# Patient Record
Sex: FEMALE | Race: WHITE | Employment: OTHER | URBAN - METROPOLITAN AREA
[De-identification: names, ages, dates, MRNs, and addresses within clinical notes are randomized per-mention and may not be internally consistent; named-entity substitution may affect disease eponyms.]

---

## 2018-09-19 ENCOUNTER — OFFICE VISIT (OUTPATIENT)
Dept: ENT CLINIC | Age: 63
End: 2018-09-19
Payer: MEDICARE

## 2018-09-19 VITALS
SYSTOLIC BLOOD PRESSURE: 145 MMHG | BODY MASS INDEX: 36 KG/M2 | HEIGHT: 66 IN | DIASTOLIC BLOOD PRESSURE: 50 MMHG | HEART RATE: 60 BPM | WEIGHT: 224 LBS

## 2018-09-19 DIAGNOSIS — J01.90 ACUTE SINUSITIS TREATED WITH ANTIBIOTICS IN THE PAST 60 DAYS: Primary | ICD-10-CM

## 2018-09-19 PROCEDURE — G8417 CALC BMI ABV UP PARAM F/U: HCPCS | Performed by: OTOLARYNGOLOGY

## 2018-09-19 PROCEDURE — 3017F COLORECTAL CA SCREEN DOC REV: CPT | Performed by: OTOLARYNGOLOGY

## 2018-09-19 PROCEDURE — 4040F PNEUMOC VAC/ADMIN/RCVD: CPT | Performed by: OTOLARYNGOLOGY

## 2018-09-19 PROCEDURE — 1101F PT FALLS ASSESS-DOCD LE1/YR: CPT | Performed by: OTOLARYNGOLOGY

## 2018-09-19 PROCEDURE — 99203 OFFICE O/P NEW LOW 30 MIN: CPT | Performed by: OTOLARYNGOLOGY

## 2018-09-19 PROCEDURE — 1123F ACP DISCUSS/DSCN MKR DOCD: CPT | Performed by: OTOLARYNGOLOGY

## 2018-09-19 PROCEDURE — G8427 DOCREV CUR MEDS BY ELIG CLIN: HCPCS | Performed by: OTOLARYNGOLOGY

## 2018-09-19 PROCEDURE — 1090F PRES/ABSN URINE INCON ASSESS: CPT | Performed by: OTOLARYNGOLOGY

## 2018-09-19 PROCEDURE — 1036F TOBACCO NON-USER: CPT | Performed by: OTOLARYNGOLOGY

## 2018-09-19 PROCEDURE — G8400 PT W/DXA NO RESULTS DOC: HCPCS | Performed by: OTOLARYNGOLOGY

## 2018-09-19 RX ORDER — INFLUENZA A VIRUS A/MICHIGAN/45/2015 X-275 (H1N1) ANTIGEN (FORMALDEHYDE INACTIVATED), INFLUENZA A VIRUS A/SINGAPORE/INFIMH-16-0019/2016 IVR-186 (H3N2) ANTIGEN (FORMALDEHYDE INACTIVATED), AND INFLUENZA B VIRUS B/MARYLAND/15/2016 BX-69A (A B/COLORADO/6/2017-LIKE VIRUS) ANTIGEN (FORMALDEHYDE INACTIVATED) 60; 60; 60 UG/.5ML; UG/.5ML; UG/.5ML
INJECTION, SUSPENSION INTRAMUSCULAR
Refills: 0 | COMMUNITY
Start: 2018-09-06 | End: 2020-04-14

## 2018-09-19 RX ORDER — AMLODIPINE BESYLATE 5 MG/1
TABLET ORAL
COMMUNITY
Start: 2016-02-01 | End: 2018-09-19

## 2018-09-19 RX ORDER — FLUTICASONE PROPIONATE 50 MCG
2 SPRAY, SUSPENSION (ML) NASAL DAILY
Qty: 1 BOTTLE | Refills: 1 | Status: SHIPPED
Start: 2018-09-19 | End: 2020-04-14

## 2018-09-19 RX ORDER — PNEUMOCOCCAL 13-VALENT CONJUGATE VACCINE 2.2; 2.2; 2.2; 2.2; 2.2; 4.4; 2.2; 2.2; 2.2; 2.2; 2.2; 2.2; 2.2 UG/.5ML; UG/.5ML; UG/.5ML; UG/.5ML; UG/.5ML; UG/.5ML; UG/.5ML; UG/.5ML; UG/.5ML; UG/.5ML; UG/.5ML; UG/.5ML; UG/.5ML
INJECTION, SUSPENSION INTRAMUSCULAR
Refills: 0 | COMMUNITY
Start: 2018-09-06 | End: 2020-04-14

## 2018-09-19 RX ORDER — ATENOLOL 50 MG/1
50 TABLET ORAL NIGHTLY
Status: ON HOLD | COMMUNITY
Start: 2002-01-28 | End: 2021-02-07 | Stop reason: HOSPADM

## 2018-09-19 RX ORDER — LOSARTAN POTASSIUM AND HYDROCHLOROTHIAZIDE 25; 100 MG/1; MG/1
1 TABLET ORAL NIGHTLY
COMMUNITY
Start: 2018-08-07 | End: 2022-03-23 | Stop reason: ALTCHOICE

## 2019-03-27 ENCOUNTER — OFFICE VISIT (OUTPATIENT)
Dept: FAMILY MEDICINE CLINIC | Age: 64
End: 2019-03-27
Payer: MEDICARE

## 2019-03-27 VITALS
RESPIRATION RATE: 16 BRPM | SYSTOLIC BLOOD PRESSURE: 126 MMHG | DIASTOLIC BLOOD PRESSURE: 80 MMHG | HEIGHT: 66 IN | OXYGEN SATURATION: 96 % | HEART RATE: 76 BPM | WEIGHT: 225 LBS | BODY MASS INDEX: 36.16 KG/M2 | TEMPERATURE: 98.6 F

## 2019-03-27 DIAGNOSIS — J32.9 SINOBRONCHITIS: Primary | ICD-10-CM

## 2019-03-27 DIAGNOSIS — J40 SINOBRONCHITIS: Primary | ICD-10-CM

## 2019-03-27 PROCEDURE — G8484 FLU IMMUNIZE NO ADMIN: HCPCS | Performed by: PHYSICIAN ASSISTANT

## 2019-03-27 PROCEDURE — 1123F ACP DISCUSS/DSCN MKR DOCD: CPT | Performed by: PHYSICIAN ASSISTANT

## 2019-03-27 PROCEDURE — 1090F PRES/ABSN URINE INCON ASSESS: CPT | Performed by: PHYSICIAN ASSISTANT

## 2019-03-27 PROCEDURE — 1036F TOBACCO NON-USER: CPT | Performed by: PHYSICIAN ASSISTANT

## 2019-03-27 PROCEDURE — 3017F COLORECTAL CA SCREEN DOC REV: CPT | Performed by: PHYSICIAN ASSISTANT

## 2019-03-27 PROCEDURE — 4040F PNEUMOC VAC/ADMIN/RCVD: CPT | Performed by: PHYSICIAN ASSISTANT

## 2019-03-27 PROCEDURE — G8400 PT W/DXA NO RESULTS DOC: HCPCS | Performed by: PHYSICIAN ASSISTANT

## 2019-03-27 PROCEDURE — G8427 DOCREV CUR MEDS BY ELIG CLIN: HCPCS | Performed by: PHYSICIAN ASSISTANT

## 2019-03-27 PROCEDURE — 99213 OFFICE O/P EST LOW 20 MIN: CPT | Performed by: PHYSICIAN ASSISTANT

## 2019-03-27 PROCEDURE — G8417 CALC BMI ABV UP PARAM F/U: HCPCS | Performed by: PHYSICIAN ASSISTANT

## 2019-03-27 RX ORDER — ALBUTEROL SULFATE 90 UG/1
2 AEROSOL, METERED RESPIRATORY (INHALATION)
Qty: 1 INHALER | Refills: 0 | Status: SHIPPED
Start: 2019-03-27 | End: 2021-02-04

## 2019-03-27 RX ORDER — DOXYCYCLINE HYCLATE 100 MG/1
100 CAPSULE ORAL 2 TIMES DAILY
Qty: 20 CAPSULE | Refills: 0 | Status: SHIPPED | OUTPATIENT
Start: 2019-03-27 | End: 2019-04-06

## 2019-03-27 RX ORDER — BENZONATATE 200 MG/1
200 CAPSULE ORAL 3 TIMES DAILY PRN
Qty: 15 CAPSULE | Refills: 0 | Status: SHIPPED
Start: 2019-03-27 | End: 2020-04-14

## 2019-03-28 ENCOUNTER — HOSPITAL ENCOUNTER (EMERGENCY)
Age: 64
Discharge: HOME | End: 2019-03-28
Payer: MEDICARE

## 2019-03-28 ENCOUNTER — APPOINTMENT (OUTPATIENT)
Dept: GENERAL RADIOLOGY | Age: 64
End: 2019-03-28
Payer: MEDICARE

## 2019-03-28 VITALS
OXYGEN SATURATION: 93 % | WEIGHT: 225 LBS | SYSTOLIC BLOOD PRESSURE: 138 MMHG | BODY MASS INDEX: 36.16 KG/M2 | TEMPERATURE: 98.8 F | HEIGHT: 66 IN | DIASTOLIC BLOOD PRESSURE: 86 MMHG | RESPIRATION RATE: 16 BRPM | HEART RATE: 89 BPM

## 2019-03-28 DIAGNOSIS — J06.9 UPPER RESPIRATORY TRACT INFECTION, UNSPECIFIED TYPE: Primary | ICD-10-CM

## 2019-03-28 DIAGNOSIS — R05.9 COUGH: ICD-10-CM

## 2019-03-28 DIAGNOSIS — E87.6 HYPOKALEMIA: ICD-10-CM

## 2019-03-28 LAB
ANION GAP SERPL CALCULATED.3IONS-SCNC: 15 MMOL/L (ref 7–16)
BACTERIA: ABNORMAL /HPF
BASOPHILS ABSOLUTE: 0.05 E9/L (ref 0–0.2)
BASOPHILS RELATIVE PERCENT: 0.5 % (ref 0–2)
BILIRUBIN URINE: ABNORMAL
BLOOD, URINE: NEGATIVE
BUN BLDV-MCNC: 22 MG/DL (ref 8–23)
CALCIUM SERPL-MCNC: 9.1 MG/DL (ref 8.6–10.2)
CHLORIDE BLD-SCNC: 97 MMOL/L (ref 98–107)
CLARITY: CLEAR
CO2: 27 MMOL/L (ref 22–29)
COLOR: YELLOW
CREAT SERPL-MCNC: 1.2 MG/DL (ref 0.5–1)
EOSINOPHILS ABSOLUTE: 0.05 E9/L (ref 0.05–0.5)
EOSINOPHILS RELATIVE PERCENT: 0.5 % (ref 0–6)
GFR AFRICAN AMERICAN: 54
GFR NON-AFRICAN AMERICAN: 45 ML/MIN/1.73
GLUCOSE BLD-MCNC: 123 MG/DL (ref 74–99)
GLUCOSE URINE: NEGATIVE MG/DL
HCT VFR BLD CALC: 37.6 % (ref 34–48)
HEMOGLOBIN: 12.5 G/DL (ref 11.5–15.5)
IMMATURE GRANULOCYTES #: 0.06 E9/L
IMMATURE GRANULOCYTES %: 0.6 % (ref 0–5)
INFLUENZA A BY PCR: NOT DETECTED
INFLUENZA B BY PCR: NOT DETECTED
KETONES, URINE: NEGATIVE MG/DL
LEUKOCYTE ESTERASE, URINE: NEGATIVE
LYMPHOCYTES ABSOLUTE: 1.83 E9/L (ref 1.5–4)
LYMPHOCYTES RELATIVE PERCENT: 17.8 % (ref 20–42)
MCH RBC QN AUTO: 29.8 PG (ref 26–35)
MCHC RBC AUTO-ENTMCNC: 33.2 % (ref 32–34.5)
MCV RBC AUTO: 89.5 FL (ref 80–99.9)
MONOCYTES ABSOLUTE: 0.83 E9/L (ref 0.1–0.95)
MONOCYTES RELATIVE PERCENT: 8.1 % (ref 2–12)
NEUTROPHILS ABSOLUTE: 7.46 E9/L (ref 1.8–7.3)
NEUTROPHILS RELATIVE PERCENT: 72.5 % (ref 43–80)
NITRITE, URINE: NEGATIVE
PDW BLD-RTO: 14.1 FL (ref 11.5–15)
PH UA: 5.5 (ref 5–9)
PLATELET # BLD: 316 E9/L (ref 130–450)
PMV BLD AUTO: 10.2 FL (ref 7–12)
POTASSIUM SERPL-SCNC: 3.1 MMOL/L (ref 3.5–5)
PROTEIN UA: 30 MG/DL
RBC # BLD: 4.2 E12/L (ref 3.5–5.5)
RBC # BLD: NORMAL 10*6/UL
RBC UA: ABNORMAL /HPF (ref 0–2)
SODIUM BLD-SCNC: 139 MMOL/L (ref 132–146)
SPECIFIC GRAVITY UA: >=1.03 (ref 1–1.03)
TROPONIN: <0.01 NG/ML (ref 0–0.03)
UROBILINOGEN, URINE: 0.2 E.U./DL
WBC # BLD: 10.3 E9/L (ref 4.5–11.5)
WBC UA: ABNORMAL /HPF (ref 0–5)

## 2019-03-28 PROCEDURE — 6370000000 HC RX 637 (ALT 250 FOR IP): Performed by: NURSE PRACTITIONER

## 2019-03-28 PROCEDURE — 71046 X-RAY EXAM CHEST 2 VIEWS: CPT

## 2019-03-28 PROCEDURE — 85025 COMPLETE CBC W/AUTO DIFF WBC: CPT

## 2019-03-28 PROCEDURE — 84484 ASSAY OF TROPONIN QUANT: CPT

## 2019-03-28 PROCEDURE — 99284 EMERGENCY DEPT VISIT MOD MDM: CPT

## 2019-03-28 PROCEDURE — 80048 BASIC METABOLIC PNL TOTAL CA: CPT

## 2019-03-28 PROCEDURE — 81001 URINALYSIS AUTO W/SCOPE: CPT

## 2019-03-28 PROCEDURE — 87502 INFLUENZA DNA AMP PROBE: CPT

## 2019-03-28 PROCEDURE — 36415 COLL VENOUS BLD VENIPUNCTURE: CPT

## 2019-03-28 RX ORDER — CETIRIZINE HYDROCHLORIDE 10 MG/1
10 TABLET ORAL ONCE
Status: COMPLETED | OUTPATIENT
Start: 2019-03-28 | End: 2019-03-28

## 2019-03-28 RX ORDER — ACETAMINOPHEN 325 MG/1
650 TABLET ORAL ONCE
Status: COMPLETED | OUTPATIENT
Start: 2019-03-28 | End: 2019-03-28

## 2019-03-28 RX ORDER — BENZONATATE 100 MG/1
200 CAPSULE ORAL ONCE
Status: COMPLETED | OUTPATIENT
Start: 2019-03-28 | End: 2019-03-28

## 2019-03-28 RX ORDER — POTASSIUM CHLORIDE 20 MEQ/1
40 TABLET, EXTENDED RELEASE ORAL ONCE
Status: COMPLETED | OUTPATIENT
Start: 2019-03-28 | End: 2019-03-28

## 2019-03-28 RX ADMIN — POTASSIUM CHLORIDE 40 MEQ: 20 TABLET, EXTENDED RELEASE ORAL at 22:21

## 2019-03-28 RX ADMIN — CETIRIZINE HYDROCHLORIDE 10 MG: 10 TABLET, FILM COATED ORAL at 20:00

## 2019-03-28 RX ADMIN — BENZONATATE 200 MG: 100 CAPSULE ORAL at 21:27

## 2019-03-28 RX ADMIN — ACETAMINOPHEN 650 MG: 325 TABLET ORAL at 20:00

## 2019-03-28 RX ADMIN — ACETAMINOPHEN 650 MG: 325 TABLET ORAL at 21:27

## 2019-03-28 ASSESSMENT — PAIN SCALES - GENERAL
PAINLEVEL_OUTOF10: 10
PAINLEVEL_OUTOF10: 10

## 2019-03-28 NOTE — ED PROVIDER NOTES
EKG 12 Lead   Result Value Ref Range    Ventricular Rate 89 BPM    Atrial Rate 89 BPM    P-R Interval 138 ms    QRS Duration 76 ms    Q-T Interval 390 ms    QTc Calculation (Bazett) 474 ms    P Axis 65 degrees    R Axis 27 degrees    T Axis 52 degrees       RADIOLOGY:  Interpreted by Radiologist.  XR CHEST STANDARD (2 VW)   Final Result      No airspace opacities or pleural effusion. EKG #1:  Interpreted by emergency department physician unless otherwise noted. Time:  2004    Rate: 89  Rhythm: Sinus. Interpretation: Normal sinus rhythm with no ST MI, no comparison available - per Dr. Dimitri Dawkins    ------------------------- NURSING NOTES AND VITALS REVIEWED ---------------------------   The nursing notes within the ED encounter and vital signs as below have been reviewed. /86   Pulse 89   Temp 98.8 °F (37.1 °C) (Oral)   Resp 16   Ht 5' 6\" (1.676 m)   Wt 225 lb (102.1 kg)   SpO2 93%   BMI 36.32 kg/m²   Oxygen Saturation Interpretation: 93%      ---------------------------------------------------PHYSICAL EXAM--------------------------------------      Constitutional/General: Alert and oriented x3, well appearing, non toxic in NAD  Head: NC/AT  Eye/Ears: PERRL, bilaterally EOMs without pain, no globe tenderness, sclera clear. Bilateral tympanic membranes are normal, no pre-or postauricular nodes, mastoids are normal.  Nose: Bilateral nasal turbinates mildly injected, boggy, clear mucus noted  Mouth: Oropharynx mildly injected with copious clear mucus, tonsils are not visible or surgically absent, uvula ML normal, handling secretions, MMM, no trismus or TMJ, normal dentition w/o abscess noted  Neck: Supple, full ROM, no ML cervical vertebral bone tenderness throughout, no meningeal signs  Pulmonary: Lungs clear to auscultation bilaterally, no wheezes, rales, or rhonchi. Not in respiratory distress  Cardiovascular:  Regular rate and rhythm, no murmurs, gallops, or rubs.  2+ distal pulses  Abdomen: Soft, non tender, non distended,   Back: NT  Extremities: Moves all extremities x 4. Warm and well perfused  Skin: warm and dry without rash  Neurologic: GCS 15, Face is symmetric (VII), EOMs are intact (III, IV, VI), pupils are equal and reactive (II, III), tongue is midline (XII). The patient is walking in a smooth balanced and coordinated fashion w/o bumping or walking into anything (vision), talking w/ appropriate content and fluency, is fully attentive, and provided a spontaneous coherent history. Psych: Normal Affect      ------------------------------------------PROGRESS NOTES -------------------------------------------    MDM  Patient has an upper respiratory infection and is on doxycycline for which she was treated at an urgent care center. She does not have any signs of infectious bacterial process. I'm still treating her with antihistamines, Tessalon, pushing by mouth fluids is important and chicken soup. Follow closely with her primary care physician. If she starts to run a fever she controlled choose to continue to take the doxycycline however I feel this is more viral than bacterial and she is a nonsmoker. Danger signs symptoms of worsening condition were detailed with the patient need follow-up with emergency department if they do occur and handout was provided. Patient had hypokalemia was treated with 40 mEq of potassium orally. Follow-up with her PCP. ED COURSE MEDICATIONS:                Medications   potassium chloride (KLOR-CON M) extended release tablet 40 mEq (has no administration in time range)   cetirizine (ZYRTEC) tablet 10 mg (10 mg Oral Given 3/28/19 2000)   acetaminophen (TYLENOL) tablet 650 mg (650 mg Oral Given 3/28/19 2000)   acetaminophen (TYLENOL) tablet 650 mg (650 mg Oral Given 3/28/19 2127)   benzonatate (TESSALON) capsule 200 mg (200 mg Oral Given 3/28/19 2127)       RE-Evaluation(s):    Time: 2034   Patients symptoms are improving.   Repeat physical examination has not changed    Re-examination:  3/28/19     Time: 2109  Patients symptoms show no change. Repeat physical examination is unchanged. COUNSELING:   I have spoken with the spouse and patient and discussed todays results, in addition to providing specific details for the plan of care and counseling regarding the diagnosis and prognosis.     --------------------------------------- IMPRESSION & DISPOSITION --------------------------------     IMPRESSION(s):  1. Upper respiratory tract infection, unspecified type    2. Cough    3. Hypokalemia          DISPOSITION:  Disposition: Discharge to home. Patient condition is good.           Lb Bronson, JUDY - CNP  03/28/19 7775

## 2019-03-30 ENCOUNTER — HOSPITAL ENCOUNTER (EMERGENCY)
Age: 64
Discharge: HOME | End: 2019-03-30
Attending: EMERGENCY MEDICINE
Payer: MEDICARE

## 2019-03-30 ENCOUNTER — APPOINTMENT (OUTPATIENT)
Dept: GENERAL RADIOLOGY | Age: 64
End: 2019-03-30
Payer: MEDICARE

## 2019-03-30 VITALS
WEIGHT: 225 LBS | RESPIRATION RATE: 16 BRPM | HEART RATE: 72 BPM | SYSTOLIC BLOOD PRESSURE: 145 MMHG | TEMPERATURE: 97.8 F | OXYGEN SATURATION: 95 % | DIASTOLIC BLOOD PRESSURE: 88 MMHG | BODY MASS INDEX: 36.16 KG/M2 | HEIGHT: 66 IN

## 2019-03-30 DIAGNOSIS — J06.9 VIRAL URI WITH COUGH: Primary | ICD-10-CM

## 2019-03-30 LAB
ALBUMIN SERPL-MCNC: 3.3 G/DL (ref 3.5–5.2)
ALP BLD-CCNC: 65 U/L (ref 35–104)
ALT SERPL-CCNC: 22 U/L (ref 0–32)
ANION GAP SERPL CALCULATED.3IONS-SCNC: 15 MMOL/L (ref 7–16)
ANISOCYTOSIS: ABNORMAL
AST SERPL-CCNC: 44 U/L (ref 0–31)
BACTERIA: ABNORMAL /HPF
BASOPHILS ABSOLUTE: 0.09 E9/L (ref 0–0.2)
BASOPHILS RELATIVE PERCENT: 0.9 % (ref 0–2)
BILIRUB SERPL-MCNC: 0.3 MG/DL (ref 0–1.2)
BILIRUBIN URINE: ABNORMAL
BLOOD, URINE: NEGATIVE
BUN BLDV-MCNC: 51 MG/DL (ref 8–23)
CALCIUM SERPL-MCNC: 8.4 MG/DL (ref 8.6–10.2)
CASTS: ABNORMAL /LPF
CHLORIDE BLD-SCNC: 102 MMOL/L (ref 98–107)
CLARITY: CLEAR
CO2: 22 MMOL/L (ref 22–29)
COLOR: YELLOW
CREAT SERPL-MCNC: 2.2 MG/DL (ref 0.5–1)
EOSINOPHILS ABSOLUTE: 0 E9/L (ref 0.05–0.5)
EOSINOPHILS RELATIVE PERCENT: 1.3 % (ref 0–6)
GFR AFRICAN AMERICAN: 27
GFR NON-AFRICAN AMERICAN: 22 ML/MIN/1.73
GLUCOSE BLD-MCNC: 121 MG/DL (ref 74–99)
GLUCOSE URINE: NEGATIVE MG/DL
HCT VFR BLD CALC: 34.9 % (ref 34–48)
HEMOGLOBIN: 11.7 G/DL (ref 11.5–15.5)
KETONES, URINE: ABNORMAL MG/DL
LACTIC ACID, SEPSIS: 1 MMOL/L (ref 0.5–1.9)
LEUKOCYTE ESTERASE, URINE: NEGATIVE
LYMPHOCYTES ABSOLUTE: 3.47 E9/L (ref 1.5–4)
LYMPHOCYTES RELATIVE PERCENT: 34.8 % (ref 20–42)
MCH RBC QN AUTO: 29.5 PG (ref 26–35)
MCHC RBC AUTO-ENTMCNC: 33.5 % (ref 32–34.5)
MCV RBC AUTO: 87.9 FL (ref 80–99.9)
METAMYELOCYTES RELATIVE PERCENT: 0.9 % (ref 0–1)
MONOCYTES ABSOLUTE: 0.4 E9/L (ref 0.1–0.95)
MONOCYTES RELATIVE PERCENT: 3.5 % (ref 2–12)
MYELOCYTE PERCENT: 0.9 % (ref 0–0)
NEUTROPHILS ABSOLUTE: 6.04 E9/L (ref 1.8–7.3)
NEUTROPHILS RELATIVE PERCENT: 59.1 % (ref 43–80)
NITRITE, URINE: NEGATIVE
PDW BLD-RTO: 14.1 FL (ref 11.5–15)
PH UA: 5 (ref 5–9)
PLATELET # BLD: 310 E9/L (ref 130–450)
PMV BLD AUTO: 10.5 FL (ref 7–12)
POTASSIUM SERPL-SCNC: 3.6 MMOL/L (ref 3.5–5)
PRO-BNP: 178 PG/ML (ref 0–125)
PROTEIN UA: ABNORMAL MG/DL
RBC # BLD: 3.97 E12/L (ref 3.5–5.5)
RBC UA: ABNORMAL /HPF (ref 0–2)
SODIUM BLD-SCNC: 139 MMOL/L (ref 132–146)
SPECIFIC GRAVITY UA: >=1.03 (ref 1–1.03)
TOTAL PROTEIN: 7.2 G/DL (ref 6.4–8.3)
TROPONIN: <0.01 NG/ML (ref 0–0.03)
UROBILINOGEN, URINE: 0.2 E.U./DL
WBC # BLD: 9.9 E9/L (ref 4.5–11.5)
WBC UA: ABNORMAL /HPF (ref 0–5)

## 2019-03-30 PROCEDURE — 94664 DEMO&/EVAL PT USE INHALER: CPT

## 2019-03-30 PROCEDURE — 6360000002 HC RX W HCPCS: Performed by: STUDENT IN AN ORGANIZED HEALTH CARE EDUCATION/TRAINING PROGRAM

## 2019-03-30 PROCEDURE — 2580000003 HC RX 258: Performed by: STUDENT IN AN ORGANIZED HEALTH CARE EDUCATION/TRAINING PROGRAM

## 2019-03-30 PROCEDURE — 85025 COMPLETE CBC W/AUTO DIFF WBC: CPT

## 2019-03-30 PROCEDURE — 87486 CHLMYD PNEUM DNA AMP PROBE: CPT

## 2019-03-30 PROCEDURE — 83605 ASSAY OF LACTIC ACID: CPT

## 2019-03-30 PROCEDURE — 99284 EMERGENCY DEPT VISIT MOD MDM: CPT

## 2019-03-30 PROCEDURE — 6370000000 HC RX 637 (ALT 250 FOR IP): Performed by: STUDENT IN AN ORGANIZED HEALTH CARE EDUCATION/TRAINING PROGRAM

## 2019-03-30 PROCEDURE — 81001 URINALYSIS AUTO W/SCOPE: CPT

## 2019-03-30 PROCEDURE — 87798 DETECT AGENT NOS DNA AMP: CPT

## 2019-03-30 PROCEDURE — 71046 X-RAY EXAM CHEST 2 VIEWS: CPT

## 2019-03-30 PROCEDURE — 84484 ASSAY OF TROPONIN QUANT: CPT

## 2019-03-30 PROCEDURE — 36415 COLL VENOUS BLD VENIPUNCTURE: CPT

## 2019-03-30 PROCEDURE — 87581 M.PNEUMON DNA AMP PROBE: CPT

## 2019-03-30 PROCEDURE — 83880 ASSAY OF NATRIURETIC PEPTIDE: CPT

## 2019-03-30 PROCEDURE — 94640 AIRWAY INHALATION TREATMENT: CPT

## 2019-03-30 PROCEDURE — 80053 COMPREHEN METABOLIC PANEL: CPT

## 2019-03-30 PROCEDURE — 87633 RESP VIRUS 12-25 TARGETS: CPT

## 2019-03-30 RX ORDER — PREDNISONE 20 MG/1
60 TABLET ORAL ONCE
Status: COMPLETED | OUTPATIENT
Start: 2019-03-30 | End: 2019-03-30

## 2019-03-30 RX ORDER — ONDANSETRON 4 MG/1
4 TABLET, ORALLY DISINTEGRATING ORAL EVERY 8 HOURS PRN
Qty: 20 TABLET | Refills: 0 | Status: SHIPPED | OUTPATIENT
Start: 2019-03-30 | End: 2021-02-04

## 2019-03-30 RX ORDER — GUAIFENESIN 600 MG/1
600 TABLET, EXTENDED RELEASE ORAL 2 TIMES DAILY
Qty: 20 TABLET | Refills: 0 | Status: SHIPPED | OUTPATIENT
Start: 2019-03-30 | End: 2019-04-09

## 2019-03-30 RX ORDER — ALBUTEROL SULFATE 2.5 MG/3ML
2.5 SOLUTION RESPIRATORY (INHALATION) ONCE
Status: COMPLETED | OUTPATIENT
Start: 2019-03-30 | End: 2019-03-30

## 2019-03-30 RX ORDER — PREDNISONE 20 MG/1
40 TABLET ORAL DAILY
Qty: 6 TABLET | Refills: 0 | Status: SHIPPED | OUTPATIENT
Start: 2019-03-30 | End: 2019-04-02

## 2019-03-30 RX ORDER — 0.9 % SODIUM CHLORIDE 0.9 %
1000 INTRAVENOUS SOLUTION INTRAVENOUS ONCE
Status: COMPLETED | OUTPATIENT
Start: 2019-03-30 | End: 2019-03-30

## 2019-03-30 RX ORDER — GUAIFENESIN 400 MG/1
400 TABLET ORAL ONCE
Status: COMPLETED | OUTPATIENT
Start: 2019-03-30 | End: 2019-03-30

## 2019-03-30 RX ADMIN — ALBUTEROL SULFATE 2.5 MG: 2.5 SOLUTION RESPIRATORY (INHALATION) at 20:05

## 2019-03-30 RX ADMIN — PREDNISONE 60 MG: 20 TABLET ORAL at 20:37

## 2019-03-30 RX ADMIN — SODIUM CHLORIDE 1000 ML: 9 INJECTION, SOLUTION INTRAVENOUS at 20:37

## 2019-03-30 RX ADMIN — SODIUM CHLORIDE 1000 ML: 9 INJECTION, SOLUTION INTRAVENOUS at 18:51

## 2019-03-30 RX ADMIN — GUAIFENESIN 400 MG: 400 TABLET ORAL at 19:35

## 2019-03-30 ASSESSMENT — PAIN DESCRIPTION - LOCATION: LOCATION: ABDOMEN;BACK

## 2019-03-30 ASSESSMENT — PAIN SCALES - GENERAL: PAINLEVEL_OUTOF10: 6

## 2019-03-30 ASSESSMENT — PAIN DESCRIPTION - ORIENTATION: ORIENTATION: LOWER

## 2019-03-30 ASSESSMENT — PAIN DESCRIPTION - DESCRIPTORS: DESCRIPTORS: ACHING

## 2019-03-30 ASSESSMENT — PAIN DESCRIPTION - PAIN TYPE: TYPE: ACUTE PAIN

## 2019-03-30 NOTE — ED PROVIDER NOTES
Department of Emergency Medicine   ED  Provider Note  Admit Date/RoomTime: 3/30/2019  5:01 PM  ED Room: [facility]          History of Present Illness:  3/30/19, Time: 6:44 PM         Adrian Allen is a 75 y.o. female with past medical history of multiple sclerosis presenting to the ED for abdominal pain and back pain, beginning this morning. The complaint has been constant, moderate in severity, and worsened by movement. Patient presented emergency department today with a four-day history of cough, subjective fever, body aches, decreased energy, chills, decreased appetite, decreased urination. She states she was seen by [facility] on Wednesday and was given Tessalon Perles, doxycycline, puffer. Patient was seen the next day at [address] [place] [address] with chest x-ray performed, patient was diagnosed with most likely viral bronchitis and per patient was told to discontinue doxycycline and puffer and continue Tessalon Perles. Patient today presents with abdominal pain and right-sided lower back pain that onset this morning. Patient has a same complaints as above stating she is increasingly fatigued, decreased energy and decreased urination. Patient does endorse that she was able to eat yesterday and drink and keep down fluids with no nausea. She states she was not able to do this on the days prior. Patient denies any sick contacts    Patient denies chest pain, blurred vision, double vision, rash, vertigo. Patient does endorse mild shortness of breath secondary cough. Patient states the cough is dry however she feels like she has chest congestion that she is unable to bring up. Review of Systems:   Pertinent positives and negatives are stated within HPI, all other systems reviewed and are negative.      --------------------------------------------- PAST HISTORY ---------------------------------------------  Past Medical History:  has no past medical history on file.     Past Surgical History:  has no past surgical history on file. Social History:  reports that she has never smoked. She has never used smokeless tobacco. She reports that she drank alcohol. She reports that she has current or past drug history. Family History: family history is not on file. The patients home medications have been reviewed. Allergies: Patient has no known allergies. ---------------------------------------------------PHYSICAL EXAM--------------------------------------    Constitutional/General: Alert and oriented x3, appears fatigued  Head: Normocephalic and atraumatic  Eyes: PERRL  Mouth: Oropharynx clear   Neck: Supple, full ROM, non tender to palpation in the midline  Pulmonary: Lungs with bilateral whezzing to auscultation bilaterally, no rales, or rhonchi. Not in respiratory distress  Cardiovascular:  Regular rate. Regular rhythm. No murmurs, gallops, or rubs. 2+ distal pulses  Chest: no chest wall tenderness  Abdomen: Soft. Round, obese. Mild tenderness to RUQ. Non distended. +BS. No rebound, guarding or rigidity. Musculoskeletal: Moves all extremities x 4. Warm and well perfused, no clubbing, cyanosis, or edema. Capillary refill <3 seconds. Right side CVA tenderness present. Skin: warm and dry. No rashes. Neurologic: GCS 15,  no focal deficits, symmetric strength 5/5 in the upper and lower extremities bilaterally  Lymphatic: No lymphadenopathy noted. Psych: Normal Affect    -------------------------------------------------- RESULTS -------------------------------------------------  I have personally reviewed all laboratory and imaging results for this patient. Results are listed below.      LABS:  Results for orders placed or performed during the hospital encounter of 03/30/19   Comprehensive Metabolic Panel   Result Value Ref Range    Sodium 139 132 - 146 mmol/L    Potassium 3.6 3.5 - 5.0 mmol/L    Chloride 102 98 - 107 mmol/L    CO2 22 22 - 29 mmol/L    Anion Gap 15 7 - 16 mmol/L Glucose 121 (H) 74 - 99 mg/dL    BUN 51 (H) 8 - 23 mg/dL    CREATININE 2.2 (H) 0.5 - 1.0 mg/dL    GFR Non-African American 22 >=60 mL/min/1.73    GFR African American 27     Calcium 8.4 (L) 8.6 - 10.2 mg/dL    Total Protein 7.2 6.4 - 8.3 g/dL    Alb 3.3 (L) 3.5 - 5.2 g/dL    Total Bilirubin 0.3 0.0 - 1.2 mg/dL    Alkaline Phosphatase 65 35 - 104 U/L    ALT 22 0 - 32 U/L    AST 44 (H) 0 - 31 U/L   CBC auto differential   Result Value Ref Range    WBC 9.9 4.5 - 11.5 E9/L    RBC 3.97 3.50 - 5.50 E12/L    Hemoglobin 11.7 11.5 - 15.5 g/dL    Hematocrit 34.9 34.0 - 48.0 %    MCV 87.9 80.0 - 99.9 fL    MCH 29.5 26.0 - 35.0 pg    MCHC 33.5 32.0 - 34.5 %    RDW 14.1 11.5 - 15.0 fL    Platelets 356 498 - 769 E9/L    MPV 10.5 7.0 - 12.0 fL    Neutrophils % 59.1 43.0 - 80.0 %    Lymphocytes % 34.8 20.0 - 42.0 %    Monocytes % 3.5 2.0 - 12.0 %    Eosinophils % 1.3 0.0 - 6.0 %    Basophils % 0.9 0.0 - 2.0 %    Neutrophils # 6.04 1.80 - 7.30 E9/L    Lymphocytes # 3.47 1.50 - 4.00 E9/L    Monocytes # 0.40 0.10 - 0.95 E9/L    Eosinophils # 0.00 (L) 0.05 - 0.50 E9/L    Basophils # 0.09 0.00 - 0.20 E9/L    Metamyelocytes Relative 0.9 0.0 - 1.0 %    Myelocytes Relative 0.9 0 - 0 %    Anisocytosis 1+    Urinalysis   Result Value Ref Range    Color, UA Yellow Straw/Yellow    Clarity, UA Clear Clear    Glucose, Ur Negative Negative mg/dL    Bilirubin Urine SMALL (A) Negative    Ketones, Urine TRACE (A) Negative mg/dL    Specific Gravity, UA >=1.030 1.005 - 1.030    Blood, Urine Negative Negative    pH, UA 5.0 5.0 - 9.0    Protein, UA TRACE Negative mg/dL    Urobilinogen, Urine 0.2 <2.0 E.U./dL    Nitrite, Urine Negative Negative    Leukocyte Esterase, Urine Negative Negative   Lactate, Sepsis   Result Value Ref Range    Lactic Acid, Sepsis 1.0 0.5 - 1.9 mmol/L   Microscopic Urinalysis   Result Value Ref Range    Casts RARE /LPF    WBC, UA 1-3 0 - 5 /HPF    RBC, UA NONE 0 - 2 /HPF    Bacteria, UA FEW (A) /HPF   Brain Natriuretic Peptide Result Value Ref Range    Pro- (H) 0 - 125 pg/mL       RADIOLOGY:  Interpreted by Radiologist.  XR CHEST STANDARD (2 VW)   Final Result   Borderline cardiomegaly. EKG:  This EKG is signed and interpreted by the EP. Time: 1839   Rate: 63  Rhythm: Sinus  Interpretation: no acute changes  Comparison: stable as compared to patient's most recent EKG      ------------------------- NURSING NOTES AND VITALS REVIEWED ---------------------------   The nursing notes within the ED encounter and vital signs as below have been reviewed by myself. BP (!) 159/90   Pulse 73   Temp 97.8 °F (36.6 °C) (Oral)   Resp 18   Ht 5' 6\" (1.676 m)   Wt 225 lb (102.1 kg)   SpO2 93%   BMI 36.32 kg/m²   Oxygen Saturation Interpretation: Normal    The patients available past medical records and past encounters were reviewed. ------------------------------ ED COURSE/MEDICAL DECISION MAKING----------------------  Medications   0.9 % sodium chloride bolus (1,000 mLs Intravenous New Bag 3/30/19 2037)   0.9 % sodium chloride bolus (0 mLs Intravenous Stopped 3/30/19 2035)   albuterol (PROVENTIL) nebulizer solution 2.5 mg (2.5 mg Nebulization Given 3/30/19 2005)   guaiFENesin tablet 400 mg (400 mg Oral Given 3/30/19 1935)   predniSONE (DELTASONE) tablet 60 mg (60 mg Oral Given 3/30/19 2037)       Medical Decision Making:   Patient is 64-year-old female presented to the emergency department today for worsening URI symptoms. Patient is currently on day 4 of symptoms. Patient presented today with decreased urination, increased fatigue, low back pain. Labs and chest x-ray troponin EKG, urinalysis. Chest x-ray showed no process and no change from previous, troponin negative, EKG negative, urine analysis negative for infection. Labs were significant for an AK I with creatinine 2.2, compared to 1.2 two days ago.  BUN/creatinine ratio consistent with prerenal AK I, which fits with patient having decreased by mouth intake of clear fluids due to nausea. Patient physical exam showed right-sided CVA tenderness. Patient was grossly wheezing, however speaking in full sentences any chest pain. Patient treated with albuterol and 2 L of IV fluids. Patient was given guaifenesin and steroids. Patient with positive relief of symptoms. Long conversation with patient explained in disease process and viral progression with days 3 through 5 with increased symptomology. Patient showed understanding. Able to be discharged home with 3 days of prednisone, advised to continue using puffer every 4-6 hours as needed. She may take Tylenol and advised not to use NSAIDs due to AK I. Patient advised to drink clear fluids. Patient to follow-up with PCP if further need, or symptoms worsen. This patient's ED course included: a personal history and physicial examination, re-evaluation prior to disposition and complex medical decision making and emergency management    This patient has remained hemodynamically stable and improved during their ED course. Re-Evaluations:           Re-evaluation. Patients symptoms are improving      Counseling: The emergency provider has spoken with the patient and discussed todays results, in addition to providing specific details for the plan of care and counseling regarding the diagnosis and prognosis. Questions are answered at this time and they are agreeable with the plan.       --------------------------------- IMPRESSION AND DISPOSITION ---------------------------------    IMPRESSION  1.  Viral URI with cough        DISPOSITION  Disposition: Discharge to home  Patient condition is good    3/30/19, 6:44 PM.    This note is prepared by Camilo German MD - PGY-1         Camilo German MD  Resident  03/30/19 6875

## 2019-03-31 LAB
FILM ARRAY ADENOVIRUS: ABNORMAL
FILM ARRAY BORDETELLA PERTUSSIS: ABNORMAL
FILM ARRAY CHLAMYDOPHILIA PNEUMONIAE: ABNORMAL
FILM ARRAY CORONAVIRUS 229E: ABNORMAL
FILM ARRAY CORONAVIRUS HKU1: ABNORMAL
FILM ARRAY CORONAVIRUS NL63: ABNORMAL
FILM ARRAY CORONAVIRUS OC43: ABNORMAL
FILM ARRAY INFLUENZA A VIRUS 09H1: ABNORMAL
FILM ARRAY INFLUENZA A VIRUS H1: ABNORMAL
FILM ARRAY INFLUENZA A VIRUS H3: ABNORMAL
FILM ARRAY INFLUENZA A VIRUS: ABNORMAL
FILM ARRAY INFLUENZA B: ABNORMAL
FILM ARRAY METAPNEUMOVIRUS: ABNORMAL
FILM ARRAY MYCOPLASMA PNEUMONIAE: ABNORMAL
FILM ARRAY PARAINFLUENZA VIRUS 1: ABNORMAL
FILM ARRAY PARAINFLUENZA VIRUS 2: ABNORMAL
FILM ARRAY PARAINFLUENZA VIRUS 3: ABNORMAL
FILM ARRAY PARAINFLUENZA VIRUS 4: ABNORMAL
FILM ARRAY RHINOVIRUS/ENTEROVIRUS: ABNORMAL
ORGANISM: ABNORMAL

## 2019-04-04 LAB
EKG ATRIAL RATE: 89 BPM
EKG P AXIS: 65 DEGREES
EKG P-R INTERVAL: 138 MS
EKG Q-T INTERVAL: 390 MS
EKG QRS DURATION: 76 MS
EKG QTC CALCULATION (BAZETT): 474 MS
EKG R AXIS: 27 DEGREES
EKG T AXIS: 52 DEGREES
EKG VENTRICULAR RATE: 89 BPM

## 2019-04-07 LAB
EKG ATRIAL RATE: 63 BPM
EKG P AXIS: 50 DEGREES
EKG P-R INTERVAL: 138 MS
EKG Q-T INTERVAL: 450 MS
EKG QRS DURATION: 80 MS
EKG QTC CALCULATION (BAZETT): 460 MS
EKG R AXIS: 21 DEGREES
EKG T AXIS: 48 DEGREES
EKG VENTRICULAR RATE: 63 BPM

## 2019-11-19 ENCOUNTER — APPOINTMENT (OUTPATIENT)
Dept: CT IMAGING | Age: 64
End: 2019-11-19
Payer: MEDICARE

## 2019-11-19 ENCOUNTER — HOSPITAL ENCOUNTER (EMERGENCY)
Age: 64
Discharge: HOME | End: 2019-11-19
Attending: EMERGENCY MEDICINE
Payer: MEDICARE

## 2019-11-19 VITALS
HEART RATE: 70 BPM | DIASTOLIC BLOOD PRESSURE: 75 MMHG | RESPIRATION RATE: 18 BRPM | OXYGEN SATURATION: 97 % | TEMPERATURE: 98.2 F | HEIGHT: 66 IN | SYSTOLIC BLOOD PRESSURE: 137 MMHG | BODY MASS INDEX: 35.84 KG/M2 | WEIGHT: 223 LBS

## 2019-11-19 DIAGNOSIS — R10.31 RIGHT LOWER QUADRANT ABDOMINAL PAIN: Primary | ICD-10-CM

## 2019-11-19 LAB
ALBUMIN SERPL-MCNC: 3.8 G/DL (ref 3.5–5.2)
ALP BLD-CCNC: 76 U/L (ref 35–104)
ALT SERPL-CCNC: 11 U/L (ref 0–32)
ANION GAP SERPL CALCULATED.3IONS-SCNC: 12 MMOL/L (ref 7–16)
AST SERPL-CCNC: 11 U/L (ref 0–31)
BACTERIA: ABNORMAL /HPF
BASOPHILS ABSOLUTE: 0.06 E9/L (ref 0–0.2)
BASOPHILS RELATIVE PERCENT: 0.5 % (ref 0–2)
BILIRUB SERPL-MCNC: 0.3 MG/DL (ref 0–1.2)
BILIRUBIN URINE: NEGATIVE
BLOOD, URINE: NEGATIVE
BUN BLDV-MCNC: 42 MG/DL (ref 8–23)
CALCIUM SERPL-MCNC: 9.2 MG/DL (ref 8.6–10.2)
CHLORIDE BLD-SCNC: 104 MMOL/L (ref 98–107)
CLARITY: CLEAR
CO2: 26 MMOL/L (ref 22–29)
COLOR: YELLOW
CREAT SERPL-MCNC: 1.3 MG/DL (ref 0.5–1)
EOSINOPHILS ABSOLUTE: 0.16 E9/L (ref 0.05–0.5)
EOSINOPHILS RELATIVE PERCENT: 1.4 % (ref 0–6)
GFR AFRICAN AMERICAN: 49
GFR NON-AFRICAN AMERICAN: 41 ML/MIN/1.73
GLUCOSE BLD-MCNC: 115 MG/DL (ref 74–99)
GLUCOSE URINE: NEGATIVE MG/DL
HCT VFR BLD CALC: 37.2 % (ref 34–48)
HEMOGLOBIN: 11.9 G/DL (ref 11.5–15.5)
IMMATURE GRANULOCYTES #: 0.11 E9/L
IMMATURE GRANULOCYTES %: 0.9 % (ref 0–5)
KETONES, URINE: NEGATIVE MG/DL
LACTIC ACID, SEPSIS: 1.5 MMOL/L (ref 0.5–1.9)
LEUKOCYTE ESTERASE, URINE: ABNORMAL
LIPASE: 116 U/L (ref 13–60)
LYMPHOCYTES ABSOLUTE: 1.73 E9/L (ref 1.5–4)
LYMPHOCYTES RELATIVE PERCENT: 14.6 % (ref 20–42)
MCH RBC QN AUTO: 28.7 PG (ref 26–35)
MCHC RBC AUTO-ENTMCNC: 32 % (ref 32–34.5)
MCV RBC AUTO: 89.9 FL (ref 80–99.9)
MONOCYTES ABSOLUTE: 1.08 E9/L (ref 0.1–0.95)
MONOCYTES RELATIVE PERCENT: 9.1 % (ref 2–12)
NEUTROPHILS ABSOLUTE: 8.68 E9/L (ref 1.8–7.3)
NEUTROPHILS RELATIVE PERCENT: 73.5 % (ref 43–80)
NITRITE, URINE: NEGATIVE
PDW BLD-RTO: 14.2 FL (ref 11.5–15)
PH UA: 5.5 (ref 5–9)
PLATELET # BLD: 283 E9/L (ref 130–450)
PMV BLD AUTO: 10.4 FL (ref 7–12)
POTASSIUM SERPL-SCNC: 4.1 MMOL/L (ref 3.5–5)
PROTEIN UA: NEGATIVE MG/DL
RBC # BLD: 4.14 E12/L (ref 3.5–5.5)
RBC UA: ABNORMAL /HPF (ref 0–2)
RENAL EPITHELIAL, UA: ABNORMAL /HPF
SODIUM BLD-SCNC: 142 MMOL/L (ref 132–146)
SPECIFIC GRAVITY UA: 1.02 (ref 1–1.03)
TOTAL PROTEIN: 7.1 G/DL (ref 6.4–8.3)
UROBILINOGEN, URINE: 0.2 E.U./DL
WBC # BLD: 11.8 E9/L (ref 4.5–11.5)
WBC UA: ABNORMAL /HPF (ref 0–5)

## 2019-11-19 PROCEDURE — 36415 COLL VENOUS BLD VENIPUNCTURE: CPT

## 2019-11-19 PROCEDURE — 96375 TX/PRO/DX INJ NEW DRUG ADDON: CPT

## 2019-11-19 PROCEDURE — 83605 ASSAY OF LACTIC ACID: CPT

## 2019-11-19 PROCEDURE — 96374 THER/PROPH/DIAG INJ IV PUSH: CPT

## 2019-11-19 PROCEDURE — 99284 EMERGENCY DEPT VISIT MOD MDM: CPT

## 2019-11-19 PROCEDURE — 80053 COMPREHEN METABOLIC PANEL: CPT

## 2019-11-19 PROCEDURE — 81001 URINALYSIS AUTO W/SCOPE: CPT

## 2019-11-19 PROCEDURE — 6360000004 HC RX CONTRAST MEDICATION: Performed by: RADIOLOGY

## 2019-11-19 PROCEDURE — 83690 ASSAY OF LIPASE: CPT

## 2019-11-19 PROCEDURE — 2580000003 HC RX 258: Performed by: RADIOLOGY

## 2019-11-19 PROCEDURE — 85025 COMPLETE CBC W/AUTO DIFF WBC: CPT

## 2019-11-19 PROCEDURE — 6360000002 HC RX W HCPCS: Performed by: EMERGENCY MEDICINE

## 2019-11-19 PROCEDURE — 74177 CT ABD & PELVIS W/CONTRAST: CPT

## 2019-11-19 PROCEDURE — 2580000003 HC RX 258: Performed by: EMERGENCY MEDICINE

## 2019-11-19 RX ORDER — ONDANSETRON 2 MG/ML
4 INJECTION INTRAMUSCULAR; INTRAVENOUS ONCE
Status: COMPLETED | OUTPATIENT
Start: 2019-11-19 | End: 2019-11-19

## 2019-11-19 RX ORDER — SODIUM CHLORIDE 0.9 % (FLUSH) 0.9 %
10 SYRINGE (ML) INJECTION ONCE
Status: COMPLETED | OUTPATIENT
Start: 2019-11-19 | End: 2019-11-19

## 2019-11-19 RX ORDER — 0.9 % SODIUM CHLORIDE 0.9 %
1000 INTRAVENOUS SOLUTION INTRAVENOUS ONCE
Status: COMPLETED | OUTPATIENT
Start: 2019-11-19 | End: 2019-11-19

## 2019-11-19 RX ORDER — MORPHINE SULFATE 4 MG/ML
4 INJECTION, SOLUTION INTRAMUSCULAR; INTRAVENOUS ONCE
Status: COMPLETED | OUTPATIENT
Start: 2019-11-19 | End: 2019-11-19

## 2019-11-19 RX ADMIN — Medication 10 ML: at 09:41

## 2019-11-19 RX ADMIN — SODIUM CHLORIDE 1000 ML: 9 INJECTION, SOLUTION INTRAVENOUS at 08:40

## 2019-11-19 RX ADMIN — IOPAMIDOL 110 ML: 755 INJECTION, SOLUTION INTRAVENOUS at 09:41

## 2019-11-19 RX ADMIN — ONDANSETRON HYDROCHLORIDE 4 MG: 2 INJECTION, SOLUTION INTRAMUSCULAR; INTRAVENOUS at 09:15

## 2019-11-19 RX ADMIN — Medication 4 MG: at 09:15

## 2019-11-19 ASSESSMENT — PAIN SCALES - GENERAL
PAINLEVEL_OUTOF10: 4
PAINLEVEL_OUTOF10: 4

## 2019-11-19 ASSESSMENT — PAIN DESCRIPTION - LOCATION: LOCATION: ABDOMEN;FLANK

## 2019-11-19 ASSESSMENT — PAIN DESCRIPTION - ORIENTATION: ORIENTATION: RIGHT

## 2019-11-19 ASSESSMENT — PAIN DESCRIPTION - PAIN TYPE: TYPE: ACUTE PAIN

## 2020-04-14 ENCOUNTER — HOSPITAL ENCOUNTER (OUTPATIENT)
Age: 65
Setting detail: OBSERVATION
Discharge: HOME | End: 2020-04-15
Attending: EMERGENCY MEDICINE | Admitting: INTERNAL MEDICINE
Payer: MEDICARE

## 2020-04-14 ENCOUNTER — APPOINTMENT (OUTPATIENT)
Dept: CT IMAGING | Age: 65
End: 2020-04-14
Payer: MEDICARE

## 2020-04-14 PROBLEM — R07.9 CHEST PAIN: Status: ACTIVE | Noted: 2020-04-14

## 2020-04-14 PROBLEM — R94.31 ABNORMAL EKG: Status: ACTIVE | Noted: 2020-04-14

## 2020-04-14 LAB
ALBUMIN SERPL-MCNC: 3.9 G/DL (ref 3.5–5.2)
ALP BLD-CCNC: 74 U/L (ref 35–104)
ALT SERPL-CCNC: 24 U/L (ref 0–32)
ANION GAP SERPL CALCULATED.3IONS-SCNC: 12 MMOL/L (ref 7–16)
AST SERPL-CCNC: 27 U/L (ref 0–31)
BASOPHILS ABSOLUTE: 0.05 E9/L (ref 0–0.2)
BASOPHILS RELATIVE PERCENT: 0.6 % (ref 0–2)
BILIRUB SERPL-MCNC: 0.4 MG/DL (ref 0–1.2)
BUN BLDV-MCNC: 25 MG/DL (ref 8–23)
CALCIUM SERPL-MCNC: 9.4 MG/DL (ref 8.6–10.2)
CHLORIDE BLD-SCNC: 105 MMOL/L (ref 98–107)
CO2: 26 MMOL/L (ref 22–29)
CREAT SERPL-MCNC: 1.2 MG/DL (ref 0.5–1)
EKG ATRIAL RATE: 58 BPM
EKG P AXIS: 67 DEGREES
EKG P-R INTERVAL: 158 MS
EKG Q-T INTERVAL: 456 MS
EKG QRS DURATION: 76 MS
EKG QTC CALCULATION (BAZETT): 447 MS
EKG R AXIS: 25 DEGREES
EKG T AXIS: 45 DEGREES
EKG VENTRICULAR RATE: 58 BPM
EOSINOPHILS ABSOLUTE: 0.2 E9/L (ref 0.05–0.5)
EOSINOPHILS RELATIVE PERCENT: 2.3 % (ref 0–6)
GFR AFRICAN AMERICAN: 54
GFR AFRICAN AMERICAN: 60
GFR NON-AFRICAN AMERICAN: 45 ML/MIN/1.73
GFR NON-AFRICAN AMERICAN: 50 ML/MIN/1.73
GLUCOSE BLD-MCNC: 115 MG/DL (ref 74–99)
GLUCOSE BLD-MCNC: 122 MG/DL (ref 74–99)
HCT VFR BLD CALC: 32.8 % (ref 34–48)
HEMOGLOBIN: 10.7 G/DL (ref 11.5–15.5)
IMMATURE GRANULOCYTES #: 0.03 E9/L
IMMATURE GRANULOCYTES %: 0.3 % (ref 0–5)
LACTIC ACID, SEPSIS: 2.7 MMOL/L (ref 0.5–1.9)
LIPASE: 31 U/L (ref 13–60)
LYMPHOCYTES ABSOLUTE: 1.43 E9/L (ref 1.5–4)
LYMPHOCYTES RELATIVE PERCENT: 16.6 % (ref 20–42)
MCH RBC QN AUTO: 29.1 PG (ref 26–35)
MCHC RBC AUTO-ENTMCNC: 32.6 % (ref 32–34.5)
MCV RBC AUTO: 89.1 FL (ref 80–99.9)
MONOCYTES ABSOLUTE: 0.7 E9/L (ref 0.1–0.95)
MONOCYTES RELATIVE PERCENT: 8.1 % (ref 2–12)
NEUTROPHILS ABSOLUTE: 6.23 E9/L (ref 1.8–7.3)
NEUTROPHILS RELATIVE PERCENT: 72.1 % (ref 43–80)
PDW BLD-RTO: 14 FL (ref 11.5–15)
PERFORMED ON: ABNORMAL
PLATELET # BLD: 231 E9/L (ref 130–450)
PMV BLD AUTO: 10.4 FL (ref 7–12)
POC CHLORIDE: 105 MMOL/L (ref 100–108)
POC CREATININE: 1.1 MG/DL (ref 0.5–1)
POC POTASSIUM: 3.5 MMOL/L (ref 3.5–5)
POC SODIUM: 143 MMOL/L (ref 132–146)
POTASSIUM REFLEX MAGNESIUM: 3.9 MMOL/L (ref 3.5–5)
RBC # BLD: 3.68 E12/L (ref 3.5–5.5)
SODIUM BLD-SCNC: 143 MMOL/L (ref 132–146)
TOTAL PROTEIN: 6.8 G/DL (ref 6.4–8.3)
TROPONIN: <0.01 NG/ML (ref 0–0.03)
WBC # BLD: 8.6 E9/L (ref 4.5–11.5)

## 2020-04-14 PROCEDURE — 6370000000 HC RX 637 (ALT 250 FOR IP): Performed by: PHYSICIAN ASSISTANT

## 2020-04-14 PROCEDURE — 6370000000 HC RX 637 (ALT 250 FOR IP): Performed by: EMERGENCY MEDICINE

## 2020-04-14 PROCEDURE — 6360000002 HC RX W HCPCS: Performed by: EMERGENCY MEDICINE

## 2020-04-14 PROCEDURE — 82947 ASSAY GLUCOSE BLOOD QUANT: CPT

## 2020-04-14 PROCEDURE — G0378 HOSPITAL OBSERVATION PER HR: HCPCS

## 2020-04-14 PROCEDURE — 96372 THER/PROPH/DIAG INJ SC/IM: CPT

## 2020-04-14 PROCEDURE — 96375 TX/PRO/DX INJ NEW DRUG ADDON: CPT

## 2020-04-14 PROCEDURE — 6360000004 HC RX CONTRAST MEDICATION: Performed by: RADIOLOGY

## 2020-04-14 PROCEDURE — 82435 ASSAY OF BLOOD CHLORIDE: CPT

## 2020-04-14 PROCEDURE — 85025 COMPLETE CBC W/AUTO DIFF WBC: CPT

## 2020-04-14 PROCEDURE — 2580000003 HC RX 258: Performed by: PHYSICIAN ASSISTANT

## 2020-04-14 PROCEDURE — 96374 THER/PROPH/DIAG INJ IV PUSH: CPT

## 2020-04-14 PROCEDURE — 82565 ASSAY OF CREATININE: CPT

## 2020-04-14 PROCEDURE — 99285 EMERGENCY DEPT VISIT HI MDM: CPT

## 2020-04-14 PROCEDURE — 2580000003 HC RX 258: Performed by: RADIOLOGY

## 2020-04-14 PROCEDURE — 71275 CT ANGIOGRAPHY CHEST: CPT

## 2020-04-14 PROCEDURE — 6360000002 HC RX W HCPCS: Performed by: PHYSICIAN ASSISTANT

## 2020-04-14 PROCEDURE — 84484 ASSAY OF TROPONIN QUANT: CPT

## 2020-04-14 PROCEDURE — 36415 COLL VENOUS BLD VENIPUNCTURE: CPT

## 2020-04-14 PROCEDURE — 97161 PT EVAL LOW COMPLEX 20 MIN: CPT

## 2020-04-14 PROCEDURE — 84132 ASSAY OF SERUM POTASSIUM: CPT

## 2020-04-14 PROCEDURE — 74177 CT ABD & PELVIS W/CONTRAST: CPT

## 2020-04-14 PROCEDURE — 83605 ASSAY OF LACTIC ACID: CPT

## 2020-04-14 PROCEDURE — 80053 COMPREHEN METABOLIC PANEL: CPT

## 2020-04-14 PROCEDURE — 96376 TX/PRO/DX INJ SAME DRUG ADON: CPT

## 2020-04-14 PROCEDURE — 84295 ASSAY OF SERUM SODIUM: CPT

## 2020-04-14 PROCEDURE — 6370000000 HC RX 637 (ALT 250 FOR IP): Performed by: INTERNAL MEDICINE

## 2020-04-14 PROCEDURE — 93010 ELECTROCARDIOGRAM REPORT: CPT | Performed by: INTERNAL MEDICINE

## 2020-04-14 PROCEDURE — 83690 ASSAY OF LIPASE: CPT

## 2020-04-14 PROCEDURE — 2580000003 HC RX 258: Performed by: EMERGENCY MEDICINE

## 2020-04-14 PROCEDURE — 93005 ELECTROCARDIOGRAM TRACING: CPT | Performed by: EMERGENCY MEDICINE

## 2020-04-14 RX ORDER — 0.9 % SODIUM CHLORIDE 0.9 %
500 INTRAVENOUS SOLUTION INTRAVENOUS ONCE
Status: COMPLETED | OUTPATIENT
Start: 2020-04-14 | End: 2020-04-14

## 2020-04-14 RX ORDER — HYDROCHLOROTHIAZIDE 25 MG/1
25 TABLET ORAL DAILY
Status: DISCONTINUED | OUTPATIENT
Start: 2020-04-14 | End: 2020-04-15

## 2020-04-14 RX ORDER — ACETAMINOPHEN 650 MG/1
650 SUPPOSITORY RECTAL EVERY 6 HOURS PRN
Status: DISCONTINUED | OUTPATIENT
Start: 2020-04-14 | End: 2020-04-15 | Stop reason: HOSPADM

## 2020-04-14 RX ORDER — ALBUTEROL SULFATE 90 UG/1
2 AEROSOL, METERED RESPIRATORY (INHALATION) EVERY 6 HOURS PRN
Status: DISCONTINUED | OUTPATIENT
Start: 2020-04-14 | End: 2020-04-14 | Stop reason: CLARIF

## 2020-04-14 RX ORDER — ATORVASTATIN CALCIUM 40 MG/1
40 TABLET, FILM COATED ORAL NIGHTLY
Status: DISCONTINUED | OUTPATIENT
Start: 2020-04-14 | End: 2020-04-15 | Stop reason: HOSPADM

## 2020-04-14 RX ORDER — SODIUM CHLORIDE 0.9 % (FLUSH) 0.9 %
10 SYRINGE (ML) INJECTION ONCE
Status: COMPLETED | OUTPATIENT
Start: 2020-04-14 | End: 2020-04-14

## 2020-04-14 RX ORDER — POTASSIUM CHLORIDE 7.45 MG/ML
10 INJECTION INTRAVENOUS PRN
Status: DISCONTINUED | OUTPATIENT
Start: 2020-04-14 | End: 2020-04-15 | Stop reason: HOSPADM

## 2020-04-14 RX ORDER — NITROGLYCERIN 0.4 MG/1
0.4 TABLET SUBLINGUAL ONCE
Status: COMPLETED | OUTPATIENT
Start: 2020-04-14 | End: 2020-04-14

## 2020-04-14 RX ORDER — POLYETHYLENE GLYCOL 3350 17 G/17G
17 POWDER, FOR SOLUTION ORAL DAILY PRN
Status: DISCONTINUED | OUTPATIENT
Start: 2020-04-14 | End: 2020-04-15 | Stop reason: HOSPADM

## 2020-04-14 RX ORDER — MORPHINE SULFATE 4 MG/ML
4 INJECTION, SOLUTION INTRAMUSCULAR; INTRAVENOUS ONCE
Status: DISCONTINUED | OUTPATIENT
Start: 2020-04-14 | End: 2020-04-14

## 2020-04-14 RX ORDER — ALBUTEROL SULFATE 2.5 MG/3ML
2.5 SOLUTION RESPIRATORY (INHALATION) EVERY 6 HOURS PRN
Status: DISCONTINUED | OUTPATIENT
Start: 2020-04-14 | End: 2020-04-15 | Stop reason: HOSPADM

## 2020-04-14 RX ORDER — SODIUM CHLORIDE 0.9 % (FLUSH) 0.9 %
10 SYRINGE (ML) INJECTION PRN
Status: DISCONTINUED | OUTPATIENT
Start: 2020-04-14 | End: 2020-04-15 | Stop reason: HOSPADM

## 2020-04-14 RX ORDER — LOSARTAN POTASSIUM 50 MG/1
100 TABLET ORAL DAILY
Status: DISCONTINUED | OUTPATIENT
Start: 2020-04-14 | End: 2020-04-15

## 2020-04-14 RX ORDER — FENTANYL CITRATE 50 UG/ML
25 INJECTION, SOLUTION INTRAMUSCULAR; INTRAVENOUS ONCE
Status: COMPLETED | OUTPATIENT
Start: 2020-04-14 | End: 2020-04-14

## 2020-04-14 RX ORDER — FENTANYL CITRATE 50 UG/ML
50 INJECTION, SOLUTION INTRAMUSCULAR; INTRAVENOUS ONCE
Status: DISCONTINUED | OUTPATIENT
Start: 2020-04-14 | End: 2020-04-15 | Stop reason: HOSPADM

## 2020-04-14 RX ORDER — ONDANSETRON 2 MG/ML
4 INJECTION INTRAMUSCULAR; INTRAVENOUS EVERY 6 HOURS PRN
Status: DISCONTINUED | OUTPATIENT
Start: 2020-04-14 | End: 2020-04-15 | Stop reason: HOSPADM

## 2020-04-14 RX ORDER — NITROGLYCERIN 0.4 MG/1
0.4 TABLET SUBLINGUAL EVERY 5 MIN PRN
Status: DISCONTINUED | OUTPATIENT
Start: 2020-04-14 | End: 2020-04-15 | Stop reason: HOSPADM

## 2020-04-14 RX ORDER — ACETAMINOPHEN 325 MG/1
650 TABLET ORAL EVERY 6 HOURS PRN
Status: DISCONTINUED | OUTPATIENT
Start: 2020-04-14 | End: 2020-04-15 | Stop reason: HOSPADM

## 2020-04-14 RX ORDER — ATENOLOL 50 MG/1
50 TABLET ORAL DAILY
Status: DISCONTINUED | OUTPATIENT
Start: 2020-04-14 | End: 2020-04-15 | Stop reason: HOSPADM

## 2020-04-14 RX ORDER — LOSARTAN POTASSIUM AND HYDROCHLOROTHIAZIDE 25; 100 MG/1; MG/1
1 TABLET ORAL DAILY
Status: DISCONTINUED | OUTPATIENT
Start: 2020-04-14 | End: 2020-04-14 | Stop reason: SDUPTHER

## 2020-04-14 RX ORDER — PROMETHAZINE HYDROCHLORIDE 25 MG/1
12.5 TABLET ORAL EVERY 6 HOURS PRN
Status: DISCONTINUED | OUTPATIENT
Start: 2020-04-14 | End: 2020-04-15 | Stop reason: HOSPADM

## 2020-04-14 RX ORDER — SODIUM CHLORIDE 0.9 % (FLUSH) 0.9 %
10 SYRINGE (ML) INJECTION EVERY 12 HOURS SCHEDULED
Status: DISCONTINUED | OUTPATIENT
Start: 2020-04-14 | End: 2020-04-15 | Stop reason: HOSPADM

## 2020-04-14 RX ORDER — POTASSIUM CHLORIDE 20 MEQ/1
40 TABLET, EXTENDED RELEASE ORAL PRN
Status: DISCONTINUED | OUTPATIENT
Start: 2020-04-14 | End: 2020-04-15 | Stop reason: HOSPADM

## 2020-04-14 RX ORDER — ASPIRIN 81 MG/1
81 TABLET, CHEWABLE ORAL DAILY
Status: DISCONTINUED | OUTPATIENT
Start: 2020-04-15 | End: 2020-04-15 | Stop reason: HOSPADM

## 2020-04-14 RX ORDER — ONDANSETRON 2 MG/ML
4 INJECTION INTRAMUSCULAR; INTRAVENOUS ONCE
Status: COMPLETED | OUTPATIENT
Start: 2020-04-14 | End: 2020-04-14

## 2020-04-14 RX ADMIN — ATORVASTATIN CALCIUM 40 MG: 40 TABLET, FILM COATED ORAL at 21:30

## 2020-04-14 RX ADMIN — LOSARTAN POTASSIUM 100 MG: 50 TABLET, FILM COATED ORAL at 21:48

## 2020-04-14 RX ADMIN — SODIUM CHLORIDE 500 ML: 9 INJECTION, SOLUTION INTRAVENOUS at 09:39

## 2020-04-14 RX ADMIN — SODIUM CHLORIDE 500 ML: 9 INJECTION, SOLUTION INTRAVENOUS at 06:41

## 2020-04-14 RX ADMIN — Medication 10 ML: at 21:30

## 2020-04-14 RX ADMIN — ACETAMINOPHEN 650 MG: 325 TABLET, FILM COATED ORAL at 21:49

## 2020-04-14 RX ADMIN — ENOXAPARIN SODIUM 40 MG: 40 INJECTION SUBCUTANEOUS at 15:38

## 2020-04-14 RX ADMIN — FENTANYL CITRATE 25 MCG: 50 INJECTION, SOLUTION INTRAMUSCULAR; INTRAVENOUS at 06:40

## 2020-04-14 RX ADMIN — IOPAMIDOL 110 ML: 755 INJECTION, SOLUTION INTRAVENOUS at 08:42

## 2020-04-14 RX ADMIN — HYDROCHLOROTHIAZIDE 25 MG: 25 TABLET ORAL at 21:00

## 2020-04-14 RX ADMIN — Medication 10 ML: at 08:42

## 2020-04-14 RX ADMIN — ATENOLOL 50 MG: 50 TABLET ORAL at 15:38

## 2020-04-14 RX ADMIN — ASPIRIN 325 MG: 325 TABLET, COATED ORAL at 16:55

## 2020-04-14 RX ADMIN — ACETAMINOPHEN 650 MG: 325 TABLET, FILM COATED ORAL at 15:38

## 2020-04-14 RX ADMIN — ONDANSETRON 4 MG: 2 INJECTION INTRAMUSCULAR; INTRAVENOUS at 06:40

## 2020-04-14 RX ADMIN — ONDANSETRON 4 MG: 2 INJECTION INTRAMUSCULAR; INTRAVENOUS at 15:37

## 2020-04-14 RX ADMIN — NITROGLYCERIN 0.4 MG: 0.4 TABLET, ORALLY DISINTEGRATING SUBLINGUAL at 09:36

## 2020-04-14 ASSESSMENT — PAIN DESCRIPTION - ONSET
ONSET: GRADUAL

## 2020-04-14 ASSESSMENT — PAIN DESCRIPTION - FREQUENCY
FREQUENCY: INTERMITTENT
FREQUENCY: CONTINUOUS

## 2020-04-14 ASSESSMENT — PAIN SCALES - GENERAL
PAINLEVEL_OUTOF10: 2
PAINLEVEL_OUTOF10: 4
PAINLEVEL_OUTOF10: 5
PAINLEVEL_OUTOF10: 5
PAINLEVEL_OUTOF10: 2
PAINLEVEL_OUTOF10: 5
PAINLEVEL_OUTOF10: 5
PAINLEVEL_OUTOF10: 4
PAINLEVEL_OUTOF10: 3
PAINLEVEL_OUTOF10: 2
PAINLEVEL_OUTOF10: 10
PAINLEVEL_OUTOF10: 3

## 2020-04-14 ASSESSMENT — PAIN - FUNCTIONAL ASSESSMENT
PAIN_FUNCTIONAL_ASSESSMENT: PREVENTS OR INTERFERES SOME ACTIVE ACTIVITIES AND ADLS

## 2020-04-14 ASSESSMENT — PAIN DESCRIPTION - PROGRESSION
CLINICAL_PROGRESSION: NOT CHANGED
CLINICAL_PROGRESSION: GRADUALLY IMPROVING
CLINICAL_PROGRESSION: NOT CHANGED

## 2020-04-14 ASSESSMENT — PAIN DESCRIPTION - LOCATION
LOCATION: ABDOMEN

## 2020-04-14 ASSESSMENT — PAIN DESCRIPTION - PAIN TYPE
TYPE: ACUTE PAIN

## 2020-04-14 ASSESSMENT — PAIN DESCRIPTION - DESCRIPTORS
DESCRIPTORS: DISCOMFORT
DESCRIPTORS: PRESSURE;DISCOMFORT
DESCRIPTORS: DISCOMFORT
DESCRIPTORS: DISCOMFORT;PRESSURE
DESCRIPTORS: PRESSURE

## 2020-04-14 NOTE — ED PROVIDER NOTES
Department of Emergency Medicine   ED  Provider Note  Admit Date/RoomTime: 4/14/2020  5:58 AM  ED Room: 20/20          History of Present Illness:  4/14/20, Time: 6:13 AM EDT  Chief Complaint   Patient presents with    Abdominal Pain     recently placed on antibiotics for diverticulitis. now having epigastric pressure causing SOB    Shortness of Breath                Eric Ivan is a 77 y.o. female presenting to the ED for abdominal pain and chest pain, beginning a few days ago. The complaint has been persistent, moderate in severity, and worsened by nothing. Reports lower abdominal pain for the last few days. LLQ pain, aching pain, radiating across the abdomen. Reports she saw her PCP last week, was started on antibiotics for presumed diverticulitis. Reports she is getting worse. Now having generalized abdominal pain that radiates upwards into the LUQ and epigastric region. She reports that the symptoms started to improve after taking her antibiotics. She reports that she awoke today and was having pressure in her chest and under her left breast.  She reports that she was having a hard time taking a deep breath. She denies fever or chills. No vomiting or diarrhea. Some constipation. No pain when breathing. She reports she is feeling bloating like she is pregnant and it is making it hard to breathe. Review of Systems:   Pertinent positives and negatives are stated within HPI, all other systems reviewed and are negative.        --------------------------------------------- PAST HISTORY ---------------------------------------------  Past Medical History:  has no past medical history on file. Past Surgical History:  has no past surgical history on file. Social History:  reports that she has never smoked. She has never used smokeless tobacco. She reports previous alcohol use. She reports previous drug use. Family History: family history is not on file. . Unless otherwise noted, family history is non contributory    The patients home medications have been reviewed. Allergies: Patient has no known allergies. I have reviewed the past medical history, past surgical history, social history, and family history    ---------------------------------------------------PHYSICAL EXAM--------------------------------------    Constitutional/General: Alert and oriented x3  Head: Normocephalic and atraumatic  Eyes: PERRL, EOMI, sclera non icteric  Mouth: Oropharynx clear, handling secretions, no trismus, no asymmetry of the posterior oropharynx or uvular edema  Neck: Supple, full ROM, no stridor, no meningeal signs  Respiratory: Lungs clear to auscultation bilaterally, no wheezes, rales, or rhonchi. Not in respiratory distress  Cardiovascular:  Regular rate. Regular rhythm. No murmurs, no aortic murmurs, no gallops, or rubs. 2+ distal pulses. Equal extremity pulses. Chest: No chest wall tenderness  Gastrointestinal:  Abdomen Soft, Non tender, Non distended. No rebound, guarding, or rigidity. No pulsatile masses. Musculoskeletal: Moves all extremities x 4. Warm and well perfused, no clubbing, cyanosis, or edema. Capillary refill <3 seconds  Skin: skin warm and dry. No rashes. Neurologic: GCS 15, no focal deficits,        -------------------------------------------------- RESULTS -------------------------------------------------  I have personally reviewed all laboratory and imaging results for this patient. Results are listed below.      LABS: (Lab results interpreted by me)  Results for orders placed or performed during the hospital encounter of 04/14/20   Comprehensive Metabolic Panel w/ Reflex to MG   Result Value Ref Range    Sodium 143 132 - 146 mmol/L    Potassium reflex Magnesium 3.9 3.5 - 5.0 mmol/L    Chloride 105 98 - 107 mmol/L    CO2 26 22 - 29 mmol/L    Anion Gap 12 7 - 16 mmol/L    Glucose 122 (H) 74 - 99 mg/dL    BUN 25 (H) 8 - 23 mg/dL    CREATININE 1.2 (H) 0.5 - 1.0 mg/dL    GFR Non-African American 45 >=60 mL/min/1.73    GFR African American 54     Calcium 9.4 8.6 - 10.2 mg/dL    Total Protein 6.8 6.4 - 8.3 g/dL    Alb 3.9 3.5 - 5.2 g/dL    Total Bilirubin 0.4 0.0 - 1.2 mg/dL    Alkaline Phosphatase 74 35 - 104 U/L    ALT 24 0 - 32 U/L    AST 27 0 - 31 U/L   CBC Auto Differential   Result Value Ref Range    WBC 8.6 4.5 - 11.5 E9/L    RBC 3.68 3.50 - 5.50 E12/L    Hemoglobin 10.7 (L) 11.5 - 15.5 g/dL    Hematocrit 32.8 (L) 34.0 - 48.0 %    MCV 89.1 80.0 - 99.9 fL    MCH 29.1 26.0 - 35.0 pg    MCHC 32.6 32.0 - 34.5 %    RDW 14.0 11.5 - 15.0 fL    Platelets 301 695 - 247 E9/L    MPV 10.4 7.0 - 12.0 fL    Neutrophils % 72.1 43.0 - 80.0 %    Immature Granulocytes % 0.3 0.0 - 5.0 %    Lymphocytes % 16.6 (L) 20.0 - 42.0 %    Monocytes % 8.1 2.0 - 12.0 %    Eosinophils % 2.3 0.0 - 6.0 %    Basophils % 0.6 0.0 - 2.0 %    Neutrophils Absolute 6.23 1.80 - 7.30 E9/L    Immature Granulocytes # 0.03 E9/L    Lymphocytes Absolute 1.43 (L) 1.50 - 4.00 E9/L    Monocytes Absolute 0.70 0.10 - 0.95 E9/L    Eosinophils Absolute 0.20 0.05 - 0.50 E9/L    Basophils Absolute 0.05 0.00 - 0.20 E9/L   Lipase   Result Value Ref Range    Lipase 31 13 - 60 U/L   Troponin   Result Value Ref Range    Troponin <0.01 0.00 - 0.03 ng/mL   Lactate, Sepsis   Result Value Ref Range    Lactic Acid, Sepsis 2.7 (H) 0.5 - 1.9 mmol/L   POCT Venous   Result Value Ref Range    POC Sodium 143 132 - 146 mmol/L    POC Potassium 3.5 3.5 - 5.0 mmol/L    POC Chloride 105 100 - 108 mmol/L    POC Glucose 115 (H) 74 - 99 mg/dl    POC Creatinine 1.1 (H) 0.5 - 1.0 mg/dL    GFR Non-African American 50 >=60 mL/min/1.73    GFR  60     Performed on SEE BELOW    EKG 12 Lead   Result Value Ref Range    Ventricular Rate 58 BPM    Atrial Rate 58 BPM    P-R Interval 158 ms    QRS Duration 76 ms    Q-T Interval 456 ms    QTc Calculation (Bazett) 447 ms    P Axis 67 degrees    R Axis 25 degrees    T Axis 45 degrees   ,       RADIOLOGY:  Interpreted by chloride flush 0.9 % injection 10 mL (10 mLs Intravenous Given 4/14/20 5481)   nitroGLYCERIN (NITROSTAT) SL tablet 0.4 mg (0.4 mg Sublingual Given 4/14/20 5082)           The cardiac monitor revealed chest pressure and shortness of breath with a heart rate in the 60s as interpreted by me. The cardiac monitor was ordered secondary to the patient's chest pain and shortness of breath and to monitor the patient for dysrhythmia. CPT [id number]       I, Dr. Edson Ball, am the primary provider of record    Medical Decision Making:   Shortness of breath with chest pressure, EKG with lateral ST depression. Pain/sx resolved with NTG. Trop neg. CT reassuring. Plan for admission for chest pain rule out. Re-Evaluations:       Improving      This patient's ED course included: a personal history and physicial examination, re-evaluation prior to disposition, multiple bedside re-evaluations, IV medications, cardiac monitoring, continuous pulse oximetry and complex medical decision making and emergency management    This patient has remained hemodynamically stable during their ED course. Consultations:  Justina covering Dr. Taylor Guillaume      Counseling: The emergency provider has spoken with the patient and discussed todays results, in addition to providing specific details for the plan of care and counseling regarding the diagnosis and prognosis. Questions are answered at this time and they are agreeable with the plan.       --------------------------------- IMPRESSION AND DISPOSITION ---------------------------------    IMPRESSION  1. Abnormal EKG    2. Chest pain, unspecified type        DISPOSITION  Disposition: Admit to telemetry  Patient condition is stable        NOTE: This report was transcribed using voice recognition software.  Every effort was made to ensure accuracy; however, inadvertent computerized transcription errors may be present        Valentina Sánchez MD  04/14/20 1725

## 2020-04-14 NOTE — ED NOTES
Venous EPOC complete as ordered, results as followed- Glu 115 high, Crea 1.13, Na+ 143, K+ 3.5, Cl- 105. Er Dr Prosper Madrigal notified of results, as well as Isabelle Watson.      Brian Ordoñez, MIC  24/16/27 2916

## 2020-04-15 ENCOUNTER — APPOINTMENT (OUTPATIENT)
Dept: NON INVASIVE DIAGNOSTICS | Age: 65
End: 2020-04-15
Payer: MEDICARE

## 2020-04-15 ENCOUNTER — APPOINTMENT (OUTPATIENT)
Dept: NUCLEAR MEDICINE | Age: 65
End: 2020-04-15
Payer: MEDICARE

## 2020-04-15 VITALS
BODY MASS INDEX: 36.35 KG/M2 | HEIGHT: 66 IN | HEART RATE: 59 BPM | OXYGEN SATURATION: 98 % | TEMPERATURE: 97.7 F | WEIGHT: 226.2 LBS | DIASTOLIC BLOOD PRESSURE: 63 MMHG | SYSTOLIC BLOOD PRESSURE: 133 MMHG | RESPIRATION RATE: 16 BRPM

## 2020-04-15 PROBLEM — K21.9 GERD (GASTROESOPHAGEAL REFLUX DISEASE): Status: ACTIVE | Noted: 2020-04-15

## 2020-04-15 PROBLEM — R06.00 DYSPNEA: Status: ACTIVE | Noted: 2020-04-15

## 2020-04-15 PROBLEM — E66.9 OBESITY: Chronic | Status: ACTIVE | Noted: 2020-04-15

## 2020-04-15 PROBLEM — K21.9 GERD (GASTROESOPHAGEAL REFLUX DISEASE): Chronic | Status: ACTIVE | Noted: 2020-04-15

## 2020-04-15 PROBLEM — R11.0 NAUSEA: Status: ACTIVE | Noted: 2020-04-15

## 2020-04-15 LAB
ANION GAP SERPL CALCULATED.3IONS-SCNC: 11 MMOL/L (ref 7–16)
BUN BLDV-MCNC: 25 MG/DL (ref 8–23)
CALCIUM SERPL-MCNC: 9 MG/DL (ref 8.6–10.2)
CHLORIDE BLD-SCNC: 102 MMOL/L (ref 98–107)
CHOLESTEROL, TOTAL: 151 MG/DL (ref 0–199)
CO2: 28 MMOL/L (ref 22–29)
CREAT SERPL-MCNC: 1.5 MG/DL (ref 0.5–1)
GFR AFRICAN AMERICAN: 42
GFR NON-AFRICAN AMERICAN: 35 ML/MIN/1.73
GLUCOSE BLD-MCNC: 112 MG/DL (ref 74–99)
HCT VFR BLD CALC: 34.3 % (ref 34–48)
HDLC SERPL-MCNC: 41 MG/DL
HEMOGLOBIN: 10.6 G/DL (ref 11.5–15.5)
LDL CHOLESTEROL CALCULATED: 93 MG/DL (ref 0–99)
LV EF: 70 %
LVEF MODALITY: NORMAL
MCH RBC QN AUTO: 28.6 PG (ref 26–35)
MCHC RBC AUTO-ENTMCNC: 30.9 % (ref 32–34.5)
MCV RBC AUTO: 92.7 FL (ref 80–99.9)
PDW BLD-RTO: 14.4 FL (ref 11.5–15)
PLATELET # BLD: 242 E9/L (ref 130–450)
PMV BLD AUTO: 11.1 FL (ref 7–12)
POTASSIUM REFLEX MAGNESIUM: 3.9 MMOL/L (ref 3.5–5)
RBC # BLD: 3.7 E12/L (ref 3.5–5.5)
SODIUM BLD-SCNC: 141 MMOL/L (ref 132–146)
TRIGL SERPL-MCNC: 86 MG/DL (ref 0–149)
VLDLC SERPL CALC-MCNC: 17 MG/DL
WBC # BLD: 8.2 E9/L (ref 4.5–11.5)

## 2020-04-15 PROCEDURE — G0378 HOSPITAL OBSERVATION PER HR: HCPCS

## 2020-04-15 PROCEDURE — 6360000002 HC RX W HCPCS: Performed by: PHYSICIAN ASSISTANT

## 2020-04-15 PROCEDURE — 36415 COLL VENOUS BLD VENIPUNCTURE: CPT

## 2020-04-15 PROCEDURE — A9500 TC99M SESTAMIBI: HCPCS | Performed by: RADIOLOGY

## 2020-04-15 PROCEDURE — 78452 HT MUSCLE IMAGE SPECT MULT: CPT

## 2020-04-15 PROCEDURE — 80061 LIPID PANEL: CPT

## 2020-04-15 PROCEDURE — 93017 CV STRESS TEST TRACING ONLY: CPT

## 2020-04-15 PROCEDURE — 3430000000 HC RX DIAGNOSTIC RADIOPHARMACEUTICAL: Performed by: RADIOLOGY

## 2020-04-15 PROCEDURE — 80048 BASIC METABOLIC PNL TOTAL CA: CPT

## 2020-04-15 PROCEDURE — 6370000000 HC RX 637 (ALT 250 FOR IP): Performed by: PHYSICIAN ASSISTANT

## 2020-04-15 PROCEDURE — 96372 THER/PROPH/DIAG INJ SC/IM: CPT

## 2020-04-15 PROCEDURE — 85027 COMPLETE CBC AUTOMATED: CPT

## 2020-04-15 PROCEDURE — 96376 TX/PRO/DX INJ SAME DRUG ADON: CPT

## 2020-04-15 PROCEDURE — 6360000002 HC RX W HCPCS: Performed by: INTERNAL MEDICINE

## 2020-04-15 RX ORDER — PANTOPRAZOLE SODIUM 40 MG/1
40 TABLET, DELAYED RELEASE ORAL
Qty: 30 TABLET | Refills: 0 | Status: SHIPPED | OUTPATIENT
Start: 2020-04-15 | End: 2021-02-04

## 2020-04-15 RX ADMIN — HYDROCHLOROTHIAZIDE 25 MG: 25 TABLET ORAL at 07:58

## 2020-04-15 RX ADMIN — ASPIRIN 81 MG 81 MG: 81 TABLET ORAL at 07:57

## 2020-04-15 RX ADMIN — Medication 35 MILLICURIE: at 10:31

## 2020-04-15 RX ADMIN — ONDANSETRON 4 MG: 2 INJECTION INTRAMUSCULAR; INTRAVENOUS at 02:19

## 2020-04-15 RX ADMIN — Medication 12 MILLICURIE: at 10:31

## 2020-04-15 RX ADMIN — LOSARTAN POTASSIUM 100 MG: 50 TABLET, FILM COATED ORAL at 07:58

## 2020-04-15 RX ADMIN — ENOXAPARIN SODIUM 40 MG: 40 INJECTION SUBCUTANEOUS at 07:57

## 2020-04-15 RX ADMIN — REGADENOSON 0.4 MG: 0.08 INJECTION, SOLUTION INTRAVENOUS at 10:26

## 2020-04-15 RX ADMIN — ATENOLOL 50 MG: 50 TABLET ORAL at 07:57

## 2020-04-15 ASSESSMENT — PAIN SCALES - GENERAL
PAINLEVEL_OUTOF10: 0

## 2020-04-15 ASSESSMENT — PAIN DESCRIPTION - PROGRESSION: CLINICAL_PROGRESSION: GRADUALLY IMPROVING

## 2020-04-15 NOTE — DISCHARGE SUMMARY
Physician Discharge Summary     Patient ID:  Jayson Kayser  [id number]  31 y.o.  1955    Admit date: 4/14/2020    Discharge date and time:  4/15/2020    Discharge Diagnoses: Principal Problem:    Chest pain  Active Problems:    Abnormal EKG    Dyspnea    Nausea    GERD (gastroesophageal reflux disease)    Obesity  Resolved Problems:    * No resolved hospital problems.  *      Consults: IP CONSULT TO INTERNAL MEDICINE  IP CONSULT TO CARDIOLOGY  IP CONSULT TO SOCIAL WORK    Procedures: N/A    Hospital Course: Observation monitored bed for epigastric/ chest pain  assess for ACS negative   ASA  statin  cycle troponin negative   Fasting lipid panel  serial EKG  Cardiology Consult appreciated --nuclear stress negative --- okay for discharge follow-up as outpatient  Medications for other co morbidities cont as appropriate w dosage adjustments as necessary     DVT PPx  DC planning PPI added may follow-up with PCP, GI and cardiology as an outpatient    Discharge Exam:  See progress note from today    Condition:  Stable    Disposition: home    Patient Instructions:   Current Discharge Medication List      START taking these medications    Details   pantoprazole (PROTONIX) 40 MG tablet Take 1 tablet by mouth every morning (before breakfast)  Qty: 30 tablet, Refills: 0         CONTINUE these medications which have NOT CHANGED    Details   ondansetron (ZOFRAN-ODT) 4 MG disintegrating tablet Place 1 tablet under the tongue every 8 hours as needed for Nausea  Qty: 20 tablet, Refills: 0      losartan-hydrochlorothiazide (HYZAAR) 100-25 MG per tablet Take 1 tablet by mouth daily       atenolol (TENORMIN) 50 MG tablet Take 50 mg by mouth daily       interferon beta-1a (AVONEX) 30 MCG injection Inject 30 mcg into the muscle once a week Given Saturday      albuterol sulfate HFA (VENTOLIN HFA) 108 (90 Base) MCG/ACT inhaler Inhale 2 puffs into the lungs every 4-6 hours as needed for Wheezing or Shortness of Breath  Qty: 1 Inhaler,

## 2020-04-15 NOTE — CONSULTS
[facility] [address] [place] [address] CARDIOLOGY    Name: Eric Ivan    Age: 77 y.o. Date of Admission: 4/14/2020  5:58 AM    Date of Service: 4/15/2020    Reason for Consultation: Epigastric/chest pain    Referring Physician: Gladis Briseno    History of Present Illness: The patient is a 77y.o. year old female patient of Dr. Windy Vidal who developed upper abdominal and lower chest discomfort over the last couple of days with trouble catching her breath. States that she was just treated by Dr. Mumtaz Liz for diverticulitis. Chest pain described as a burning sensation without arm or neck radiation. Not made worse with exertion and admission ECG/cardiac enzymes were unremarkable. Currently in no distress. Past Medical History:   Hypertension, hyperlipidemia, gastroesophageal reflux and diverticulitis. Denies diabetes or CAD. Review of Systems:     Constitutional: No fever, chills, sweats  Cardiac: As per HPI  Pulmonary: As per HPI  HEENT: No visual disturbances or difficult swallowing  GI: No nausea, vomiting, diarrhea, abdominal pain, rectal bleeding  : No dysuria or hematuria  Endocrine: No excessive thirst, heat or cold intolerance. Musculoskeletal: Joint pain but no muscle aches. No claudication  Skin: No skin breakdown or rashes  Neuro: No headache, confusion, or seizures  Psych: No depression, anxiety      Family History:  History reviewed. No pertinent family history.     Social History:  Social History     Socioeconomic History    Marital status:      Spouse name: Not on file    Number of children: Not on file    Years of education: Not on file    Highest education level: Not on file   Occupational History    Not on file   Social Needs    Financial resource strain: Not on file    Food insecurity     Worry: Not on file     Inability: Not on file    Transportation needs     Medical: Not on file     Non-medical: Not on file   Tobacco Use    Smoking status: Never Smoker    Smokeless tobacco: Never Used   Substance and Sexual Activity    Alcohol use: Not Currently    Drug use: Not Currently    Sexual activity: Not on file   Lifestyle    Physical activity     Days per week: Not on file     Minutes per session: Not on file    Stress: Not on file   Relationships    Social connections     Talks on phone: Not on file     Gets together: Not on file     Attends Tenriism service: Not on file     Active member of club or organization: Not on file     Attends meetings of clubs or organizations: Not on file     Relationship status: Not on file    Intimate partner violence     Fear of current or ex partner: Not on file     Emotionally abused: Not on file     Physically abused: Not on file     Forced sexual activity: Not on file   Other Topics Concern    Not on file   Social History Narrative    Not on file       Allergies:  No Known Allergies    Current Medications:  Current Facility-Administered Medications   Medication Dose Route Frequency Provider Last Rate Last Dose    regadenoson (LEXISCAN) injection 0.4 mg  0.4 mg Intravenous ONCE PRN Devonte Haney MD        fentaNYL (SUBLIMAZE) injection 50 mcg  50 mcg Intravenous Once Shasta Bagley MD        atenolol (TENORMIN) tablet 50 mg  50 mg Oral Daily HENRY Dent   50 mg at 04/14/20 1538    sodium chloride flush 0.9 % injection 10 mL  10 mL Intravenous 2 times per day HENRY Dent   10 mL at 04/14/20 2130    sodium chloride flush 0.9 % injection 10 mL  10 mL Intravenous PRN HENRY Dent        acetaminophen (TYLENOL) tablet 650 mg  650 mg Oral Q6H PRN HENRY Dent   650 mg at 04/14/20 2149    Or    acetaminophen (TYLENOL) suppository 650 mg  650 mg Rectal Q6H PRN HENRY Dent        polyethylene glycol (GLYCOLAX) packet 17 g  17 g Oral Daily PRN HENRY Dent        promethazine (PHENERGAN) tablet 12.5 mg  12.5 mg Oral Q6H PRN HENRY Dent        Or   [facility] ondansetron (ZOFRAN) injection 4 mg  4 mg Intravenous Q6H PRN HENRY Dent   4 mg at 04/15/20 3774    atorvastatin (LIPITOR) tablet 40 mg  40 mg Oral Nightly HENRY Dent   40 mg at 04/14/20 2130    aspirin chewable tablet 81 mg  81 mg Oral Daily HENRY Dent        enoxaparin (LOVENOX) injection 40 mg  40 mg Subcutaneous Daily HENRY Dent   40 mg at 04/14/20 1538    nitroGLYCERIN (NITROSTAT) SL tablet 0.4 mg  0.4 mg Sublingual Q5 Min PRN HENRY Dent        potassium chloride (KLOR-CON M) extended release tablet 40 mEq  40 mEq Oral PRN HENRY Dent        Or    potassium bicarb-citric acid (EFFER-K) effervescent tablet 40 mEq  40 mEq Oral PRN HENRY Dent        Or    potassium chloride 10 mEq/100 mL IVPB (Peripheral Line)  10 mEq Intravenous PRN HENRY Dent        albuterol (PROVENTIL) nebulizer solution 2.5 mg  2.5 mg Nebulization Q6H PRN HENRY Dent        losartan (COZAAR) tablet 100 mg  100 mg Oral Daily HENRY Dent   100 mg at 04/14/20 2148    And    hydroCHLOROthiazide (HYDRODIURIL) tablet 25 mg  25 mg Oral Daily HENRY Dent   25 mg at 04/14/20 2100         Physical Exam:  BP (!) 135/95   Pulse 56   Temp 98 °F (36.7 °C) (Oral)   Resp 18   Ht 5' 6\" (1.676 m)   Wt 226 lb (102.5 kg)   SpO2 98%   BMI 36.48 kg/m²   Weight change: Wt Readings from Last 3 Encounters:   04/14/20 226 lb (102.5 kg)   11/19/19 223 lb (101.2 kg)   03/30/19 225 lb (102.1 kg)         General: Awake, alert, oriented x3, no acute distress  HEENT: Unremarkable  Neck: No JVD or bruits. Cardiac: Regular rate and rhythm, normal S1 and S2, no extra heart sounds, murmurs, heaves, thrills  Resp: Lungs clear without wheezing or crackles. No accessory muscle use or retraction  Abdomen: soft, nontender, nondistended, no gross organomegaly or mass  Skin: Warm and dry, no cyanosis.   Musculoskeletal: normal tone and strength in the upper and lower extremities bilaterally  Neuro: Grossly unremarkable  Psych: Cooperative, and normal affect    Intake/Output:    Intake/Output Summary (Last 24 hours) at 4/15/2020 0635  Last data filed at 4/14/2020 2147  Gross per 24 hour   Intake 742 ml   Output --   Net 742 ml     No intake/output data recorded. Laboratory Tests:  Lab Results   Component Value Date    CREATININE 1.5 (H) 04/15/2020    BUN 25 (H) 04/15/2020     04/15/2020    K 3.9 04/15/2020     04/15/2020    CO2 28 04/15/2020     No results for input(s): CKTOTAL, CKMB in the last 72 hours. Invalid input(s): TROPONONI  No results found for: BNP  Lab Results   Component Value Date    WBC 8.2 04/15/2020    RBC 3.70 04/15/2020    HGB 10.6 04/15/2020    HCT 34.3 04/15/2020    MCV 92.7 04/15/2020    MCH 28.6 04/15/2020    MCHC 30.9 04/15/2020    RDW 14.4 04/15/2020     04/15/2020    MPV 11.1 04/15/2020     Recent Labs     04/14/20  0646   ALKPHOS 74   ALT 24   AST 27   PROT 6.8   BILITOT 0.4   LABALBU 3.9     No results found for: MG  No results found for: PROTIME, INR  No results found for: TSH  No components found for: CHLPL  Lab Results   Component Value Date    TRIG 86 04/15/2020     Lab Results   Component Value Date    HDL 41 04/15/2020     Lab Results   Component Value Date    LDLCALC 93 04/15/2020     No results found for: INR    Admission ECG reviewed by me was normal.    ASSESSMENT / PLAN:    #1.  Epigastric discomfort-occurs with abdominal fullness and inability to catch her breath. Potentially anginal but unlikely and may be due to reflux. However, given her risk factors, have scheduled Lexiscan nuclear stress testing later on this morning as the presence of beta-blocker obviates exercise nuclear stress testing. Recommendations to follow results. Continue aspirin. #2.  Elevated creatinine-admission creatinine 1.5, up from 1.2. Stopped losartan HCT. Follow blood pressure. #3. Hypertension- blood pressure controlled. Recommendations as above. #4. Hyperlipidemia-LDL controlled. #5. To be seen later on this morning in the stress lab.             Electronically signed by Valerie Cadena MD on 4/15/2020 at 6:35 AM

## 2020-04-15 NOTE — PROGRESS NOTES
Occupational Therapy  OT evaluation attempted. Pt is currently off the unit in testing. Will check back at later time.   Jennifer Dobbins, OTR/L
no observable LOB. Pt reported increased SOB that significantly limited pt's ambulation distance. Pt stated that she is well below her PLOF in terms of endurance. Pt was left sitting at EOB with nurse in room with all needs met. At this time pt would benefit from skilled PT services to improve overall level of functional mobility and endurance as it is significantly impaired currently. Pt does not require the use of AD at this time. Treatment:  Patient practiced and was instructed in the following treatment:     Cues for pursed lip breathing   Cues for safety with functional mobility   Education on the role of PT and the benefits of continued PT to improve strength, function, endurance, and mobility    Pt's/ family goals   1. To feel better    Patient and or family understand(s) diagnosis, prognosis, and plan of care. yes    PLAN:    PT care will be provided in accordance with the objectives noted above. Exercises and functional mobility practice will be used as well as appropriate assistive devices or modalities to obtain goals. Patient and family education will also be administered as needed. Frequency of treatments: 2-5x/week x 1-2 weeks. Time in  1525  Time out  1538    Total Treatment Time  13 minutes     Evaluation Time includes thorough review of current medical information, gathering information on past medical history/social history and prior level of function, completion of standardized testing/informal observation of tasks, assessment of data and education on plan of care and goals.     CPT codes:  [x] Low Complexity PT evaluation 98451  [] Moderate Complexity PT evaluation 38629  [] High Complexity PT evaluation 22994  [] PT Re-evaluation 78000  [] Gait training 54081  minutes  [] Manual therapy 07834  minutes  [] Therapeutic activities 34435  minutes  [] Therapeutic exercises 74162  minutes  [] Neuromuscular reeducation 42643  minutes     Cleo Shipley DPT  [id number]

## 2020-04-15 NOTE — CARE COORDINATION
SOCIAL WORK;  Pt is currently out of room for testing. Will check back at later time. Aliyah DIAZ    Pt is back from test and she reports she will be discharged later. Mrs. Christine Perez is alert, oriented and independent. She lives with her  who will transport her home. Pt does not have any needs at this time.   Aliyah DIAZ

## 2020-04-15 NOTE — H&P
<0.01 04/14/2020    TROPONINI <0.01 04/14/2020     U/A:    Lab Results   Component Value Date    COLORU Yellow 11/19/2019    PROTEINU Negative 11/19/2019    PHUR 5.5 11/19/2019    LABCAST RARE 03/30/2019    WBCUA 1-3 11/19/2019    RBCUA NONE 11/19/2019    BACTERIA FEW 11/19/2019    CLARITYU Clear 11/19/2019    SPECGRAV 1.020 11/19/2019    LEUKOCYTESUR TRACE 11/19/2019    UROBILINOGEN 0.2 11/19/2019    BILIRUBINUR Negative 11/19/2019    BLOODU Negative 11/19/2019    GLUCOSEU Negative 11/19/2019     ABG:  No results found for: PH, PCO2, PO2, HCO3, BE, THGB, TCO2, O2SAT  HgBA1c:  No results found for: LABA1C  FLP:    Lab Results   Component Value Date    TRIG 86 04/15/2020    HDL 41 04/15/2020    LDLCALC 93 04/15/2020    LABVLDL 17 04/15/2020     TSH:  No results found for: TSH    CT ABDOMEN PELVIS W IV CONTRAST Additional Contrast? None   Final Result   Multilevel degenerative changes in the spine. Findings suggest a   herniated disc at L4-5 with superior extent. Cardiomegaly. No evidence of pulmonary emboli               CTA PULMONARY W CONTRAST   Final Result   Multilevel degenerative changes in the spine. Findings suggest a   herniated disc at L4-5 with superior extent. Cardiomegaly. No evidence of pulmonary emboli               NM Cardiac Stress Test Nuclear Imaging    (Results Pending)   NM MYOCARDIAL SPECT REST EXERCISE OR RX    (Results Pending)       ASSESSMENT:      Principal Problem:    Chest pain  Active Problems:    Abnormal EKG    Dyspnea    Nausea    GERD (gastroesophageal reflux disease)    Obesity  Resolved Problems:    * No resolved hospital problems.  *      PLAN:    Observation monitored bed  assess for ACS negative   ASA  statin  cycle troponin negative   Fasting lipid panel  serial EKG  Cardiology Consult appreciated --nuclear stress negative --- okay for discharge follow-up as outpatient  Medications for other co morbidities cont as appropriate w dosage adjustments as

## 2020-04-16 ENCOUNTER — CARE COORDINATION (OUTPATIENT)
Dept: CARE COORDINATION | Age: 65
End: 2020-04-16

## 2020-04-16 NOTE — CARE COORDINATION
COVID-19 ED/Flu Clinic Initial Outreach Note    Patient contacted regarding recent visit for viral symptoms. This Gatito Pineda contacted the patient by telephone to perform post discharge call. Verified name and  with patient as identifiers. Provided introduction to self, and reason for call due to viral symptoms of infection and/or exposure to COVID-19. Patient presented to emergency department/flu clinic with complaints of viral symptoms/exposure to COVID. Patient reports symptoms are the same. Due to no new or worsening symptoms the RN CTN/AVINASH was not notified for escalation. Discussed exposure protocols and quarantine with CDC Guidelines What To Do If You Are Sick    Patient was given an opportunity for questions and concerns. Stay home except to get medical care    Separate yourself from other people and animals in your home    Call ahead before visiting your doctor    Wear a facemask    Cover your coughs and sneezes    Clean your hands often    Avoid sharing personal household items    Clean all high-touch surfaces everyday    Monitor your symptoms  Seek prompt medical attention if your illness is worsening (e.g., difficulty breathing). Before seeking care, call your healthcare provider and tell them that you have, or are being evaluated for, COVID-19. Put on a facemask before you enter the facility. These steps will help the healthcare provider's office to keep other people in the office or waiting room from getting infected or exposed. Ask your healthcare provider to call the local or [facility] health department. Persons who are placed under If you have a medical emergency and need to call 911, notify the dispatch personnel that you have, or are being evaluated for COVID-19. If possible, put on a facemask before emergency medical services arrive.     The patient agrees to contact the [facility] exposure line [phone], local health department [place] [facility]: ([phone]) and PCP office for questions related to their healthcare. Author provided contact information for future reference. Patient/family/caregiver given information for [facility] and agrees to enroll yes  Patient's preferred e-mail: [email or web address]   Patient's preferred phone number: [phone]  Based on Loop alert triggers, patient will be contacted by nurse care manager for worsening symptoms.

## 2021-02-03 ENCOUNTER — APPOINTMENT (OUTPATIENT)
Dept: CT IMAGING | Age: 66
DRG: 308 | End: 2021-02-03
Payer: MEDICARE

## 2021-02-03 ENCOUNTER — APPOINTMENT (OUTPATIENT)
Dept: GENERAL RADIOLOGY | Age: 66
DRG: 308 | End: 2021-02-03
Payer: MEDICARE

## 2021-02-03 ENCOUNTER — HOSPITAL ENCOUNTER (INPATIENT)
Age: 66
LOS: 4 days | Discharge: HOME | DRG: 308 | End: 2021-02-07
Attending: EMERGENCY MEDICINE | Admitting: INTERNAL MEDICINE
Payer: MEDICARE

## 2021-02-03 DIAGNOSIS — I48.91 ATRIAL FIBRILLATION WITH RAPID VENTRICULAR RESPONSE (HCC): Primary | ICD-10-CM

## 2021-02-03 DIAGNOSIS — I48.91 NEW ONSET A-FIB (HCC): ICD-10-CM

## 2021-02-03 LAB
ALBUMIN SERPL-MCNC: 3.9 G/DL (ref 3.5–5.2)
ALP BLD-CCNC: 65 U/L (ref 35–104)
ALT SERPL-CCNC: 16 U/L (ref 0–32)
ANION GAP SERPL CALCULATED.3IONS-SCNC: 13 MMOL/L (ref 7–16)
AST SERPL-CCNC: 17 U/L (ref 0–31)
BACTERIA: ABNORMAL /HPF
BASOPHILS ABSOLUTE: 0.05 E9/L (ref 0–0.2)
BASOPHILS RELATIVE PERCENT: 0.5 % (ref 0–2)
BILIRUB SERPL-MCNC: 0.3 MG/DL (ref 0–1.2)
BILIRUBIN URINE: ABNORMAL
BLOOD, URINE: NEGATIVE
BUN BLDV-MCNC: 53 MG/DL (ref 8–23)
CALCIUM SERPL-MCNC: 9.4 MG/DL (ref 8.6–10.2)
CHLORIDE BLD-SCNC: 104 MMOL/L (ref 98–107)
CLARITY: CLEAR
CO2: 22 MMOL/L (ref 22–29)
COLOR: YELLOW
CREAT SERPL-MCNC: 2.2 MG/DL (ref 0.5–1)
EOSINOPHILS ABSOLUTE: 0.1 E9/L (ref 0.05–0.5)
EOSINOPHILS RELATIVE PERCENT: 1 % (ref 0–6)
EPITHELIAL CELLS, UA: ABNORMAL /HPF
GFR AFRICAN AMERICAN: 27
GFR NON-AFRICAN AMERICAN: 22 ML/MIN/1.73
GLUCOSE BLD-MCNC: 121 MG/DL (ref 74–99)
GLUCOSE URINE: NEGATIVE MG/DL
HCT VFR BLD CALC: 38 % (ref 34–48)
HEMOGLOBIN: 12 G/DL (ref 11.5–15.5)
HYALINE CASTS: ABNORMAL /LPF (ref 0–2)
IMMATURE GRANULOCYTES #: 0.04 E9/L
IMMATURE GRANULOCYTES %: 0.4 % (ref 0–5)
KETONES, URINE: ABNORMAL MG/DL
LACTIC ACID: 2.5 MMOL/L (ref 0.5–2.2)
LEUKOCYTE ESTERASE, URINE: ABNORMAL
LIPASE: 50 U/L (ref 13–60)
LYMPHOCYTES ABSOLUTE: 2.81 E9/L (ref 1.5–4)
LYMPHOCYTES RELATIVE PERCENT: 28.5 % (ref 20–42)
MAGNESIUM: 1.6 MG/DL (ref 1.6–2.6)
MCH RBC QN AUTO: 28.5 PG (ref 26–35)
MCHC RBC AUTO-ENTMCNC: 31.6 % (ref 32–34.5)
MCV RBC AUTO: 90.3 FL (ref 80–99.9)
MONOCYTES ABSOLUTE: 0.85 E9/L (ref 0.1–0.95)
MONOCYTES RELATIVE PERCENT: 8.6 % (ref 2–12)
NEUTROPHILS ABSOLUTE: 6.02 E9/L (ref 1.8–7.3)
NEUTROPHILS RELATIVE PERCENT: 61 % (ref 43–80)
NITRITE, URINE: NEGATIVE
PDW BLD-RTO: 14.1 FL (ref 11.5–15)
PH UA: 5 (ref 5–9)
PLATELET # BLD: 285 E9/L (ref 130–450)
PMV BLD AUTO: 11.5 FL (ref 7–12)
POTASSIUM SERPL-SCNC: 3.5 MMOL/L (ref 3.5–5)
PRO-BNP: ABNORMAL PG/ML (ref 0–125)
PROTEIN UA: ABNORMAL MG/DL
RBC # BLD: 4.21 E12/L (ref 3.5–5.5)
RBC UA: ABNORMAL /HPF (ref 0–2)
RENAL EPITHELIAL, UA: ABNORMAL /HPF
SODIUM BLD-SCNC: 139 MMOL/L (ref 132–146)
SPECIFIC GRAVITY UA: >=1.03 (ref 1–1.03)
TOTAL PROTEIN: 7 G/DL (ref 6.4–8.3)
TROPONIN: 0.01 NG/ML (ref 0–0.03)
TSH SERPL DL<=0.05 MIU/L-ACNC: 0.86 UIU/ML (ref 0.27–4.2)
UROBILINOGEN, URINE: 0.2 E.U./DL
WBC # BLD: 9.9 E9/L (ref 4.5–11.5)
WBC UA: ABNORMAL /HPF (ref 0–5)

## 2021-02-03 PROCEDURE — 99285 EMERGENCY DEPT VISIT HI MDM: CPT

## 2021-02-03 PROCEDURE — 85025 COMPLETE CBC W/AUTO DIFF WBC: CPT

## 2021-02-03 PROCEDURE — 84443 ASSAY THYROID STIM HORMONE: CPT

## 2021-02-03 PROCEDURE — 83605 ASSAY OF LACTIC ACID: CPT

## 2021-02-03 PROCEDURE — 2580000003 HC RX 258: Performed by: EMERGENCY MEDICINE

## 2021-02-03 PROCEDURE — 2580000003 HC RX 258: Performed by: STUDENT IN AN ORGANIZED HEALTH CARE EDUCATION/TRAINING PROGRAM

## 2021-02-03 PROCEDURE — 96375 TX/PRO/DX INJ NEW DRUG ADDON: CPT

## 2021-02-03 PROCEDURE — 2060000000 HC ICU INTERMEDIATE R&B

## 2021-02-03 PROCEDURE — 96365 THER/PROPH/DIAG IV INF INIT: CPT

## 2021-02-03 PROCEDURE — 2500000003 HC RX 250 WO HCPCS: Performed by: EMERGENCY MEDICINE

## 2021-02-03 PROCEDURE — 71045 X-RAY EXAM CHEST 1 VIEW: CPT

## 2021-02-03 PROCEDURE — 6360000004 HC RX CONTRAST MEDICATION: Performed by: RADIOLOGY

## 2021-02-03 PROCEDURE — 93005 ELECTROCARDIOGRAM TRACING: CPT | Performed by: NURSE PRACTITIONER

## 2021-02-03 PROCEDURE — 2500000003 HC RX 250 WO HCPCS: Performed by: STUDENT IN AN ORGANIZED HEALTH CARE EDUCATION/TRAINING PROGRAM

## 2021-02-03 PROCEDURE — 74177 CT ABD & PELVIS W/CONTRAST: CPT

## 2021-02-03 PROCEDURE — 83690 ASSAY OF LIPASE: CPT

## 2021-02-03 PROCEDURE — 81001 URINALYSIS AUTO W/SCOPE: CPT

## 2021-02-03 PROCEDURE — 96361 HYDRATE IV INFUSION ADD-ON: CPT

## 2021-02-03 PROCEDURE — 80053 COMPREHEN METABOLIC PANEL: CPT

## 2021-02-03 PROCEDURE — 84484 ASSAY OF TROPONIN QUANT: CPT

## 2021-02-03 PROCEDURE — 83735 ASSAY OF MAGNESIUM: CPT

## 2021-02-03 PROCEDURE — 83880 ASSAY OF NATRIURETIC PEPTIDE: CPT

## 2021-02-03 RX ORDER — 0.9 % SODIUM CHLORIDE 0.9 %
1000 INTRAVENOUS SOLUTION INTRAVENOUS ONCE
Status: DISCONTINUED | OUTPATIENT
Start: 2021-02-03 | End: 2021-02-03

## 2021-02-03 RX ORDER — 0.9 % SODIUM CHLORIDE 0.9 %
1000 INTRAVENOUS SOLUTION INTRAVENOUS ONCE
Status: COMPLETED | OUTPATIENT
Start: 2021-02-03 | End: 2021-02-03

## 2021-02-03 RX ORDER — SODIUM CHLORIDE 0.9 % (FLUSH) 0.9 %
10 SYRINGE (ML) INJECTION EVERY 12 HOURS SCHEDULED
Status: DISCONTINUED | OUTPATIENT
Start: 2021-02-03 | End: 2021-02-07 | Stop reason: HOSPADM

## 2021-02-03 RX ORDER — ACETAMINOPHEN 325 MG/1
650 TABLET ORAL EVERY 6 HOURS PRN
Status: DISCONTINUED | OUTPATIENT
Start: 2021-02-03 | End: 2021-02-07 | Stop reason: HOSPADM

## 2021-02-03 RX ORDER — PANTOPRAZOLE SODIUM 40 MG/1
40 TABLET, DELAYED RELEASE ORAL
Status: DISCONTINUED | OUTPATIENT
Start: 2021-02-04 | End: 2021-02-05

## 2021-02-03 RX ORDER — ACETAMINOPHEN 650 MG/1
650 SUPPOSITORY RECTAL EVERY 6 HOURS PRN
Status: DISCONTINUED | OUTPATIENT
Start: 2021-02-03 | End: 2021-02-07 | Stop reason: HOSPADM

## 2021-02-03 RX ORDER — POLYETHYLENE GLYCOL 3350 17 G/17G
17 POWDER, FOR SOLUTION ORAL DAILY PRN
Status: DISCONTINUED | OUTPATIENT
Start: 2021-02-03 | End: 2021-02-07 | Stop reason: HOSPADM

## 2021-02-03 RX ORDER — ONDANSETRON 2 MG/ML
4 INJECTION INTRAMUSCULAR; INTRAVENOUS EVERY 6 HOURS PRN
Status: DISCONTINUED | OUTPATIENT
Start: 2021-02-03 | End: 2021-02-07 | Stop reason: HOSPADM

## 2021-02-03 RX ORDER — DILTIAZEM HYDROCHLORIDE 5 MG/ML
20 INJECTION INTRAVENOUS ONCE
Status: DISCONTINUED | OUTPATIENT
Start: 2021-02-03 | End: 2021-02-03

## 2021-02-03 RX ORDER — SODIUM CHLORIDE 0.9 % (FLUSH) 0.9 %
10 SYRINGE (ML) INJECTION PRN
Status: DISCONTINUED | OUTPATIENT
Start: 2021-02-03 | End: 2021-02-07 | Stop reason: HOSPADM

## 2021-02-03 RX ORDER — PROMETHAZINE HYDROCHLORIDE 25 MG/1
12.5 TABLET ORAL EVERY 6 HOURS PRN
Status: DISCONTINUED | OUTPATIENT
Start: 2021-02-03 | End: 2021-02-07 | Stop reason: HOSPADM

## 2021-02-03 RX ORDER — SODIUM CHLORIDE 0.9 % (FLUSH) 0.9 %
10 SYRINGE (ML) INJECTION PRN
Status: DISCONTINUED | OUTPATIENT
Start: 2021-02-03 | End: 2021-02-03 | Stop reason: SDUPTHER

## 2021-02-03 RX ORDER — SODIUM CHLORIDE 9 MG/ML
INJECTION, SOLUTION INTRAVENOUS CONTINUOUS
Status: DISCONTINUED | OUTPATIENT
Start: 2021-02-03 | End: 2021-02-07

## 2021-02-03 RX ADMIN — SODIUM CHLORIDE 1000 ML: 9 INJECTION, SOLUTION INTRAVENOUS at 17:13

## 2021-02-03 RX ADMIN — SODIUM CHLORIDE: 9 INJECTION, SOLUTION INTRAVENOUS at 15:40

## 2021-02-03 RX ADMIN — DILTIAZEM HYDROCHLORIDE 25 MG: 5 INJECTION INTRAVENOUS at 17:03

## 2021-02-03 RX ADMIN — DILTIAZEM HYDROCHLORIDE 25 MG: 5 INJECTION INTRAVENOUS at 16:50

## 2021-02-03 RX ADMIN — DEXTROSE MONOHYDRATE 5 MG/HR: 50 INJECTION, SOLUTION INTRAVENOUS at 18:29

## 2021-02-03 RX ADMIN — SODIUM CHLORIDE 1000 ML: 9 INJECTION, SOLUTION INTRAVENOUS at 15:49

## 2021-02-03 RX ADMIN — IOPAMIDOL 90 ML: 755 INJECTION, SOLUTION INTRAVENOUS at 16:07

## 2021-02-03 ASSESSMENT — ENCOUNTER SYMPTOMS
WHEEZING: 0
SHORTNESS OF BREATH: 1
COUGH: 0
VOMITING: 0
SPUTUM PRODUCTION: 0
ABDOMINAL PAIN: 1

## 2021-02-03 ASSESSMENT — PAIN SCALES - GENERAL: PAINLEVEL_OUTOF10: 6

## 2021-02-03 ASSESSMENT — PAIN DESCRIPTION - DESCRIPTORS: DESCRIPTORS: BURNING

## 2021-02-03 ASSESSMENT — PAIN DESCRIPTION - LOCATION: LOCATION: ABDOMEN

## 2021-02-03 NOTE — ED PROVIDER NOTES
Eden Thomas is a 74year old female with PMH of GERD, MS, who presented to ED with abdominal pain and shortness of breath. Patient's had abdominal pain for about 1 week. Patient was diagnosed with diverticulitis by PCP and started on antibiotics. Patient began to have palpitations and shortness of breath that started today. Patient presented to emergency department for evaluation. Patient has any fever, chills, nausea, vomiting. Patient does have a history of MS and is managed by her PCP. Patient denies any history of A. fib. Patient is not on anticoagulation. Patient does not have any history of heart failure. Patient does not smoke and denies alcohol use. The history is provided by the patient and medical records. Shortness of Breath  Severity:  Moderate  Onset quality:  Gradual  Duration:  1 day  Timing:  Constant  Progression:  Worsening  Chronicity:  New  Context: activity    Relieved by:  Nothing  Worsened by:  Nothing  Ineffective treatments:  None tried  Associated symptoms: abdominal pain    Associated symptoms: no chest pain, no cough, no diaphoresis, no fever, no headaches, no neck pain, no rash, no sputum production, no vomiting and no wheezing    Risk factors: no recent alcohol use, no hx of PE/DVT, no prolonged immobilization, no recent surgery and no tobacco use         Review of Systems   Constitutional: Positive for fatigue. Negative for chills, diaphoresis and fever. Eyes: Negative for photophobia and visual disturbance. Respiratory: Positive for shortness of breath. Negative for cough, sputum production and wheezing. Cardiovascular: Negative for chest pain. Gastrointestinal: Positive for abdominal pain. Negative for abdominal distention, diarrhea, nausea and vomiting. Genitourinary: Negative for dysuria. Musculoskeletal: Negative for neck pain and neck stiffness. Skin: Negative for rash. Allergic/Immunologic: Negative for immunocompromised state. Neurological: Negative for headaches. Psychiatric/Behavioral: Negative for confusion. Physical Exam  Vitals signs and nursing note reviewed. Constitutional:       General: She is not in acute distress. Appearance: She is well-developed. She is not ill-appearing or diaphoretic. HENT:      Head: Normocephalic and atraumatic. Eyes:      Extraocular Movements: Extraocular movements intact. Conjunctiva/sclera: Conjunctivae normal.      Pupils: Pupils are equal, round, and reactive to light. Neck:      Musculoskeletal: Normal range of motion and neck supple. No neck rigidity or muscular tenderness. Cardiovascular:      Rate and Rhythm: Tachycardia present. Rhythm irregular. Pulmonary:      Effort: Pulmonary effort is normal.      Breath sounds: Normal breath sounds. Abdominal:      General: Bowel sounds are normal. There is no distension. Palpations: Abdomen is soft. Tenderness: There is abdominal tenderness. There is no guarding or rebound. Comments: Mild diffuse tenderness on exam   Musculoskeletal:      Comments: Trace LE edema   Skin:     General: Skin is warm and dry. Capillary Refill: Capillary refill takes less than 2 seconds. Coloration: Skin is not pale. Findings: No erythema or rash. Neurological:      Mental Status: She is alert and oriented to person, place, and time. Psychiatric:         Mood and Affect: Mood normal.          Procedures     MDM  Number of Diagnoses or Management Options  Diagnosis management comments: Hailee Franklin is a 74year old female who presented to ED with concerns for shortness of breath with palpitations present for one day. Patient was also having abdominal pain and recently started outpatient for diverticulitis. Patient did take abx today.    Patient was found to be in afib with RVR at time of arrival to ED, patient initally treated with IVF, CT scan was unremarkable for diverticulitis or complication, possible symptoms improved with abx. Patient did not have any findings of acute infectious process. patient's HR remains 130's-140's patient was given cardizem bolus x 2 patient was rate controlled with HR 80's-90's and remained in afib patient did become mildly hypotensive with SPB 90's following Cardizem which improved with IVF. Patient was not placed on drip since rate was controlled less than 100 and patient's SBP remained around 100. Patient will be admitted for new onset afib. Patient's shortness of breath improved with rate control. ED Course as of Feb 04 1524 Wed Feb 03, 2021 2217 EKG: This EKG is signed and interpreted by me. Rate: 143  Rhythm: Atrial fibrillation  Interpretation: non-specific EKG, normal axis, QTc prolonged 500,   Comparison: changes compared to previous EKG, new onset afib      [SS]      ED Course User Index  [SS] John Alcantar MD      ED Course as of Feb 04 1524 Wed Feb 03, 2021 2217 EKG: This EKG is signed and interpreted by me. Rate: 143  Rhythm: Atrial fibrillation  Interpretation: non-specific EKG, normal axis, QTc prolonged 500,   Comparison: changes compared to previous EKG, new onset afib      [SS]      ED Course User Index  [SS] John Alcantar MD       --------------------------------------------- PAST HISTORY ---------------------------------------------  Past Medical History:  has no past medical history on file. Past Surgical History:  has no past surgical history on file. Social History:  reports that she has never smoked. She has never used smokeless tobacco. She reports previous alcohol use. She reports previous drug use. Family History: family history is not on file. The patients home medications have been reviewed. Allergies: Patient has no known allergies.     -------------------------------------------------- RESULTS -------------------------------------------------    LABS:  Results for orders placed or performed during the hospital encounter of 02/03/21   Troponin   Result Value Ref Range    Troponin 0.01 0.00 - 0.03 ng/mL   CBC Auto Differential   Result Value Ref Range    WBC 9.9 4.5 - 11.5 E9/L    RBC 4.21 3.50 - 5.50 E12/L    Hemoglobin 12.0 11.5 - 15.5 g/dL    Hematocrit 38.0 34.0 - 48.0 %    MCV 90.3 80.0 - 99.9 fL    MCH 28.5 26.0 - 35.0 pg    MCHC 31.6 (L) 32.0 - 34.5 %    RDW 14.1 11.5 - 15.0 fL    Platelets 415 955 - 514 E9/L    MPV 11.5 7.0 - 12.0 fL    Neutrophils % 61.0 43.0 - 80.0 %    Immature Granulocytes % 0.4 0.0 - 5.0 %    Lymphocytes % 28.5 20.0 - 42.0 %    Monocytes % 8.6 2.0 - 12.0 %    Eosinophils % 1.0 0.0 - 6.0 %    Basophils % 0.5 0.0 - 2.0 %    Neutrophils Absolute 6.02 1.80 - 7.30 E9/L    Immature Granulocytes # 0.04 E9/L    Lymphocytes Absolute 2.81 1.50 - 4.00 E9/L    Monocytes Absolute 0.85 0.10 - 0.95 E9/L    Eosinophils Absolute 0.10 0.05 - 0.50 E9/L    Basophils Absolute 0.05 0.00 - 0.20 E9/L   Comprehensive Metabolic Panel   Result Value Ref Range    Sodium 139 132 - 146 mmol/L    Potassium 3.5 3.5 - 5.0 mmol/L    Chloride 104 98 - 107 mmol/L    CO2 22 22 - 29 mmol/L    Anion Gap 13 7 - 16 mmol/L    Glucose 121 (H) 74 - 99 mg/dL    BUN 53 (H) 8 - 23 mg/dL    CREATININE 2.2 (H) 0.5 - 1.0 mg/dL    GFR Non-African American 22 >=60 mL/min/1.73    GFR African American 27     Calcium 9.4 8.6 - 10.2 mg/dL    Total Protein 7.0 6.4 - 8.3 g/dL    Albumin 3.9 3.5 - 5.2 g/dL    Total Bilirubin 0.3 0.0 - 1.2 mg/dL    Alkaline Phosphatase 65 35 - 104 U/L    ALT 16 0 - 32 U/L    AST 17 0 - 31 U/L   Magnesium   Result Value Ref Range    Magnesium 1.6 1.6 - 2.6 mg/dL   Lactic Acid, Plasma   Result Value Ref Range    Lactic Acid 2.5 (H) 0.5 - 2.2 mmol/L   Lipase   Result Value Ref Range    Lipase 50 13 - 60 U/L   Urinalysis   Result Value Ref Range    Color, UA Yellow Straw/Yellow    Clarity, UA Clear Clear    Glucose, Ur Negative Negative mg/dL    Bilirubin Urine SMALL (A) Negative    Ketones, Urine TRACE (A) Negative mg/dL Specific Gravity, UA >=1.030 1.005 - 1.030    Blood, Urine Negative Negative    pH, UA 5.0 5.0 - 9.0    Protein, UA TRACE Negative mg/dL    Urobilinogen, Urine 0.2 <2.0 E.U./dL    Nitrite, Urine Negative Negative    Leukocyte Esterase, Urine TRACE (A) Negative   Brain Natriuretic Peptide   Result Value Ref Range    Pro-BNP 11,788 (H) 0 - 125 pg/mL   TSH WITHOUT REFLEX   Result Value Ref Range    TSH 0.856 0.270 - 4.200 uIU/mL   Microscopic Urinalysis   Result Value Ref Range    Hyaline Casts, UA 0-2 0 - 2 /LPF    WBC, UA 0-1 0 - 5 /HPF    RBC, UA 0-1 0 - 2 /HPF    Epithelial Cells, UA FEW /HPF    Renal Epithelial, UA FEW /HPF    Bacteria, UA FEW (A) None Seen /HPF   Protime-INR   Result Value Ref Range    Protime 12.8 (H) 9.3 - 12.4 sec    INR 1.1    EKG 12 Lead   Result Value Ref Range    Ventricular Rate 143 BPM    Atrial Rate 192 BPM    QRS Duration 80 ms    Q-T Interval 324 ms    QTc Calculation (Bazett) 500 ms    R Axis 49 degrees    T Axis 61 degrees       RADIOLOGY:  XR CHEST PORTABLE   Final Result   No acute process. CT ABDOMEN PELVIS W IV CONTRAST Additional Contrast? None   Final Result   No acute findings in the abdomen pelvis. No interval change in bilateral lung base nodules since November 2019.              ------------------------- NURSING NOTES AND VITALS REVIEWED ---------------------------  Date / Time Roomed:  2/3/2021  3:12 PM  ED Bed Assignment:  [id number]    The nursing notes within the ED encounter and vital signs as below have been reviewed.      Patient Vitals for the past 24 hrs:   BP Temp Temp src Pulse Resp SpO2   02/04/21 1340 109/69 97.8 °F (36.6 °C) Temporal 108 24 98 %   02/04/21 1134 107/85 98 °F (36.7 °C) -- 137 14 --   02/04/21 1009 114/78 -- -- 131 -- --   02/04/21 1000 114/78 -- -- 123 -- --   02/04/21 0946 120/88 -- -- 148 -- --   02/04/21 0932 (!) 89/58 -- -- 125 -- --   02/04/21 0900 126/87 -- -- 103 -- --   02/04/21 0832 (!) 117/94 -- -- 118 16 100 %   02/04/21 5430 -- -- -- 96 -- --   02/04/21 0430 -- -- -- 102 -- --   02/04/21 0155 106/86 -- -- 113 -- --   02/04/21 0048 (!) 96/52 -- -- 109 16 100 %   02/04/21 0047 -- -- -- 96 -- --   02/03/21 2103 112/66 -- -- 114 18 98 %   02/03/21 1855 102/83 -- -- 130 -- --   02/03/21 1709 99/84 -- -- 93 -- --   02/03/21 1659 (!) 112/91 -- -- 107 24 98 %   02/03/21 1644 (!) 123/94 -- -- 144 20 99 %   02/03/21 1609 (!) 135/97 -- -- 144 18 98 %   02/03/21 1542 92/78 -- -- 141 20 99 %       Oxygen Saturation Interpretation: Normal    ------------------------------------------ PROGRESS NOTES ------------------------------------------  Re-evaluation(s):  Time: 8pm  Patients symptoms show no change  Repeat physical examination is not changed    Counseling:  I have spoken with the patient and discussed todays results, in addition to providing specific details for the plan of care and counseling regarding the diagnosis and prognosis. Their questions are answered at this time and they are agreeable with the plan of admission.    --------------------------------- ADDITIONAL PROVIDER NOTES ---------------------------------  Consultations: Spoke with Dr. Blake Ling. Discussed case. They will admit the patient. This patient's ED course included: a personal history and physicial examination, re-evaluation prior to disposition, multiple bedside re-evaluations, IV medications and cardiac monitoring    This patient has remained hemodynamically stable during their ED course. Diagnosis:  1. Atrial fibrillation with rapid ventricular response ([address]    2. New onset a-fib [facility])        Disposition:  Patient's disposition: Admit to telemetry  Patient's condition is stable.               Phil Gleason MD  Resident  02/04/21 9536

## 2021-02-03 NOTE — PROGRESS NOTES
Name: Deevndra Jewell  : 1955  MRN: [id number]    Date: 2/3/2021    Benefits of immediately proceeding with Radiology exam outweigh the risks and therefore the following is being waived:      [] Pregnancy test    [] Protocol for Iodine allergy    [] MRI questionnaire    [x] Zeina Snyder MD

## 2021-02-03 NOTE — ED NOTES
FIRST PROVIDER CONTACT ASSESSMENT NOTE      Department of Emergency Medicine   Admit Date: No admission date for patient encounter. Chief Complaint: No chief complaint on file. History of Present Illness:    Josef Batres is a 74 y.o. female who presents to the ED for complaining of abdominal pain with a flareup of diverticulitis was seen by PCP and started on antibiotics on Monday. She states today she began having some shortness of breath with worsening abdominal pain and epigastric burning. States she feels as though she is having a flareup of her MS. She denies any chest pain at this time.   Denies any concern for Covid exposure.        -----------------END OF FIRST PROVIDER CONTACT ASSESSMENT NOTE--------------  Electronically signed by JUDY Rivers CNP   DD: 2/3/21               JUDY Conner CNP  02/03/21 1455

## 2021-02-03 NOTE — ED NOTES
EKG given to Dr. Zakiya Dunbar. He concerned that he would like her back as soon as a room is available. RN notified.       Skyla Gonzalez  02/03/21 3257

## 2021-02-03 NOTE — ED NOTES
Dr. Suhas Benítez notified of patient vitals, cardizem discontinued, new order obtained for fluid bolus.       Rosa Elena Gray RN  02/03/21 5115

## 2021-02-04 LAB
EKG ATRIAL RATE: 192 BPM
EKG Q-T INTERVAL: 324 MS
EKG QRS DURATION: 80 MS
EKG QTC CALCULATION (BAZETT): 500 MS
EKG R AXIS: 49 DEGREES
EKG T AXIS: 61 DEGREES
EKG VENTRICULAR RATE: 143 BPM
INR BLD: 1.1
PROTHROMBIN TIME: 12.8 SEC (ref 9.3–12.4)

## 2021-02-04 PROCEDURE — 93010 ELECTROCARDIOGRAM REPORT: CPT | Performed by: INTERNAL MEDICINE

## 2021-02-04 PROCEDURE — 2060000000 HC ICU INTERMEDIATE R&B

## 2021-02-04 PROCEDURE — 6370000000 HC RX 637 (ALT 250 FOR IP): Performed by: INTERNAL MEDICINE

## 2021-02-04 PROCEDURE — 2500000003 HC RX 250 WO HCPCS: Performed by: EMERGENCY MEDICINE

## 2021-02-04 PROCEDURE — 2580000003 HC RX 258: Performed by: INTERNAL MEDICINE

## 2021-02-04 PROCEDURE — 2580000003 HC RX 258: Performed by: EMERGENCY MEDICINE

## 2021-02-04 PROCEDURE — 85610 PROTHROMBIN TIME: CPT

## 2021-02-04 RX ORDER — CIPROFLOXACIN 500 MG/1
500 TABLET, FILM COATED ORAL 2 TIMES DAILY
COMMUNITY
Start: 2021-02-01 | End: 2021-02-08

## 2021-02-04 RX ORDER — PANTOPRAZOLE SODIUM 40 MG/1
40 TABLET, DELAYED RELEASE ORAL NIGHTLY
COMMUNITY
End: 2021-10-13

## 2021-02-04 RX ORDER — DICYCLOMINE HYDROCHLORIDE 10 MG/1
10 CAPSULE ORAL 2 TIMES DAILY
COMMUNITY
End: 2021-10-13

## 2021-02-04 RX ORDER — METOPROLOL TARTRATE 50 MG/1
50 TABLET, FILM COATED ORAL 2 TIMES DAILY
Status: DISCONTINUED | OUTPATIENT
Start: 2021-02-04 | End: 2021-02-05

## 2021-02-04 RX ORDER — METRONIDAZOLE 500 MG/1
500 TABLET ORAL 3 TIMES DAILY
Status: ON HOLD | COMMUNITY
Start: 2021-02-01 | End: 2021-02-07 | Stop reason: HOSPADM

## 2021-02-04 RX ADMIN — Medication 10 ML: at 14:01

## 2021-02-04 RX ADMIN — METOPROLOL TARTRATE 25 MG: 25 TABLET, FILM COATED ORAL at 10:10

## 2021-02-04 RX ADMIN — SODIUM CHLORIDE: 9 INJECTION, SOLUTION INTRAVENOUS at 15:03

## 2021-02-04 RX ADMIN — PANTOPRAZOLE SODIUM 40 MG: 40 TABLET, DELAYED RELEASE ORAL at 21:47

## 2021-02-04 RX ADMIN — DEXTROSE MONOHYDRATE 15 MG/HR: 50 INJECTION, SOLUTION INTRAVENOUS at 11:31

## 2021-02-04 RX ADMIN — DEXTROSE MONOHYDRATE 15 MG/HR: 50 INJECTION, SOLUTION INTRAVENOUS at 02:49

## 2021-02-04 RX ADMIN — APIXABAN 5 MG: 5 TABLET, FILM COATED ORAL at 20:55

## 2021-02-04 RX ADMIN — PANTOPRAZOLE SODIUM 40 MG: 40 TABLET, DELAYED RELEASE ORAL at 11:32

## 2021-02-04 RX ADMIN — METOPROLOL TARTRATE 50 MG: 50 TABLET, FILM COATED ORAL at 20:54

## 2021-02-04 RX ADMIN — DEXTROSE MONOHYDRATE 12.5 MG/HR: 50 INJECTION, SOLUTION INTRAVENOUS at 04:37

## 2021-02-04 RX ADMIN — APIXABAN 5 MG: 5 TABLET, FILM COATED ORAL at 10:09

## 2021-02-04 RX ADMIN — DEXTROSE MONOHYDRATE 15 MG/HR: 50 INJECTION, SOLUTION INTRAVENOUS at 16:12

## 2021-02-04 ASSESSMENT — ENCOUNTER SYMPTOMS
DIARRHEA: 0
PHOTOPHOBIA: 0
ABDOMINAL DISTENTION: 0
NAUSEA: 0

## 2021-02-04 NOTE — CONSULTS
[facility] [address] [place] [address]    Name: Zohra Bernardr    Age: 74 y.o. Date of Admission: 2/3/2021  3:12 PM    Date of Service: 2/4/2021    Reason for Consultation: atrial fibrillation    Referring Physician: DR Alejandra Gallegos  Primary Care Physician: Wilmer Coronel MD    History of Present Illness: The patient is a 74y.o. year old female presenting to ED yesterday for c/o dypnea on exertion . States has been having lower abdominal pain since the weekend 5-6 days, sttes she thinks is a flair of her diverticulitis and maybe having sa flair of MS too. Yesterday started noticing that while walking round the house was getting SOB too. As this was new and not improving she came to the ED where she was found to be in afib RVR. She was started of IV cardizem in the ED. She was seen 9/2020 by [facility]  For atypical CP/abdominal pain and was in sinus rhythm at that time. She had a lexiscan SPECT which showed EF>70% no ischemia or scar. She does have HTN, HL, GERD, diverticulitis, MS. No diabetes or CAD, denies ALEKSANDR. Past Medical History:   History reviewed. No pertinent past medical history. Review of Systems:   Constitutional: No fever, chills, sweats  Cardiac: As per HPI  Pulmonary: No cough, wheeze, hemoptysis  HEENT: No visual disturbances, difficult swallowing  GI: No nausea, vomiting, diarrhea, abdominal pain, rectal bleeding  : No dysuria or hematuria  Endocrine: No excessive thirst, heat or cold intolerance. Musculoskeletal: No joint pain or muscle aches. No claudication  Skin: No skin breakdown or rashes  Neuro: No headache, confusion, or seizures  Psych: No depression, anxiety    Family History:  History reviewed. No pertinent family history.     Social History:  Social History     Socioeconomic History    Marital status:      Spouse name: Not on file    Number of children: Not on file    Years of education: Not on file    Highest education level: Not on file Occupational History    Not on file   Social Needs    Financial resource strain: Not on file    Food insecurity     Worry: Not on file     Inability: Not on file    Transportation needs     Medical: Not on file     Non-medical: Not on file   Tobacco Use    Smoking status: Never Smoker    Smokeless tobacco: Never Used   Substance and Sexual Activity    Alcohol use: Not Currently    Drug use: Not Currently    Sexual activity: Not on file   Lifestyle    Physical activity     Days per week: Not on file     Minutes per session: Not on file    Stress: Not on file   Relationships    Social connections     Talks on phone: Not on file     Gets together: Not on file     Attends Zoroastrian service: Not on file     Active member of club or organization: Not on file     Attends meetings of clubs or organizations: Not on file     Relationship status: Not on file    Intimate partner violence     Fear of current or ex partner: Not on file     Emotionally abused: Not on file     Physically abused: Not on file     Forced sexual activity: Not on file   Other Topics Concern    Not on file   Social History Narrative    Not on file       Allergies:  No Known Allergies    Home Medications:  Prior to Admission medications    Medication Sig Start Date End Date Taking?  Authorizing Provider   ciprofloxacin (CIPRO) 500 MG tablet Take 500 mg by mouth 2 times daily 2/1/21 2/8/21 Yes Historical Provider, MD   pantoprazole (PROTONIX) 40 MG tablet Take 40 mg by mouth nightly   Yes Historical Provider, MD   metroNIDAZOLE (FLAGYL) 500 MG tablet Take 500 mg by mouth 3 times daily 2/1/21 2/8/21 Yes Historical Provider, MD   dicyclomine (BENTYL) 10 MG capsule Take 10 mg by mouth 2 times daily   Yes Historical Provider, MD   losartan-hydrochlorothiazide (HYZAAR) 100-25 MG per tablet Take 1 tablet by mouth nightly  8/7/18  Yes Historical Provider, MD   atenolol (TENORMIN) 50 MG tablet Take 50 mg by mouth nightly  1/28/02  Yes Historical Provider, MD   interferon beta-1a (AVONEX) 30 MCG injection Inject 30 mcg into the muscle once a week Given Sunday   Yes Historical Provider, MD   amLODIPine (NORVASC) 5 MG tablet Inhale into the lungs 2/1/16 9/19/18  Historical Provider, MD       Current Medications:  Current Facility-Administered Medications   Medication Dose Route Frequency Provider Last Rate Last Admin    0.9 % sodium chloride infusion   Intravenous Continuous Jane Holland  mL/hr at 02/03/21 1829 Restarted at 02/03/21 1829    dilTIAZem 100 mg in dextrose 5 % 100 mL infusion ([facility])  5 mg/hr Intravenous Continuous Jane Holland MD 15 mL/hr at 02/04/21 1012 15 mg/hr at 02/04/21 1012    sodium chloride flush 0.9 % injection 10 mL  10 mL Intravenous 2 times per day Dale Jamison MD   Stopped at 02/04/21 0038    sodium chloride flush 0.9 % injection 10 mL  10 mL Intravenous PRN Dale Jamison MD        promethazine (PHENERGAN) tablet 12.5 mg  12.5 mg Oral Q6H PRN Dale Jamison MD        Or    ondansetron (ZOFRAN) injection 4 mg  4 mg Intravenous Q6H PRN Dale Jamison MD        polyethylene glycol (GLYCOLAX) packet 17 g  17 g Oral Daily PRN Dale Jamison MD        acetaminophen (TYLENOL) tablet 650 mg  650 mg Oral Q6H PRN Dale Jamison MD        Or   Marcell Olivia acetaminophen (TYLENOL) suppository 650 mg  650 mg Rectal Q6H PRN Dale Jamison MD        perflutren lipid microspheres (DEFINITY) injection 1.65 mg  1.5 mL Intravenous ONCE PRN Dale Jamison MD        apixaban (ELIQUIS) tablet 5 mg  5 mg Oral BID Dale Jamison MD   5 mg at 02/04/21 1009    metoprolol tartrate (LOPRESSOR) tablet 25 mg  25 mg Oral BID Dale Jamison MD   25 mg at 02/04/21 1010    pantoprazole (PROTONIX) tablet 40 mg  40 mg Oral QAM  Dale Jamison MD         Current Outpatient Medications   Medication Sig Dispense Refill    ciprofloxacin (CIPRO) 500 MG tablet Take 500 mg by mouth 2 times daily      pantoprazole (PROTONIX) 40 MG tablet Take 40 mg by mouth nightly      metroNIDAZOLE (FLAGYL) 500 MG tablet Take 500 mg by mouth 3 times daily      dicyclomine (BENTYL) 10 MG capsule Take 10 mg by mouth 2 times daily      losartan-hydrochlorothiazide (HYZAAR) 100-25 MG per tablet Take 1 tablet by mouth nightly       atenolol (TENORMIN) 50 MG tablet Take 50 mg by mouth nightly       interferon beta-1a (AVONEX) 30 MCG injection Inject 30 mcg into the muscle once a week Given Sunday        sodium chloride 100 mL/hr at 02/03/21 1829    dilTIAZem (CARDIZEM) 100 mg in dextrose 5% 100 mL ([facility]-[place]) 15 mg/hr (02/04/21 1012)       Physical Exam:  /78   Pulse 131   Temp 96.9 °F (36.1 °C)   Resp 16   Ht 5' 6\" (1.676 m)   Wt 225 lb (102.1 kg)   SpO2 100%   BMI 36.32 kg/m²   Weight change: Wt Readings from Last 3 Encounters:   02/03/21 225 lb (102.1 kg)   04/15/20 226 lb 3.2 oz (102.6 kg)   11/19/19 223 lb (101.2 kg)     General: Awake, alert, oriented x3, no acute distress  HEENT: Normocephalic and atraumatic, extraocular movements intact, pupils equal and round, moist mucus membranes, sclera anicteric  Neck: No JVD, no carotid bruits, no thyromegaly, no adenopathy  Cardiac: irreg irreg rate mildly fast, normal S1 and physiologically split S2, no S3, no S4. Apical impulse is nondisplaced. No murmurs, no pericardial rubs, no clicks. Carotid upstrokes brisk. Respiratory: Clear bilaterally; no wheezes, no rales, no rhonchi. Unlabored respirations  Abdomen: Soft, mildly tender, nondistended, bowel sounds+, no hepatomegaly, no masses, no abdominal bruits  Extremities: No edema, no cyanosis, no clubbing. Distal pulses intact  Skin: Intact, warm and dry, no rashes, no breakdown  Musculoskeletal: normal tone and strength in the upper and lower extremities bilaterally  Neuro: No focal deficits. Moves all extremities appropriately to command.   Normal sensation in the upper and lower extremities bilaterally  Psychiatric: Cooperative, and normal affect    Intake/Output:    Intake/Output Summary (Last 24 hours) at 2/4/2021 1117  Last data filed at 2/3/2021 1643  Gross per 24 hour   Intake 1000 ml   Output --   Net 1000 ml     No intake/output data recorded. Laboratory Tests:  Last 3 CBC:  Recent Labs     02/03/21  1505   WBC 9.9   RBC 4.21   HGB 12.0   HCT 38.0   MCV 90.3   MCH 28.5   MCHC 31.6*   RDW 14.1      MPV 11.5       Last 3 CMP:    Recent Labs     02/03/21  1505      K 3.5      CO2 22   BUN 53*   CREATININE 2.2*   GLUCOSE 121*   CALCIUM 9.4   PROT 7.0   LABALBU 3.9   BILITOT 0.3   ALKPHOS 65   AST 17   ALT 16       Last 3 Mag/Phos:  Recent Labs     02/03/21  1505   MG 1.6       Last 3 CK, CKMB, Troponin  Recent Labs     02/03/21  1505   TROPONINI 0.01       Last 3 Pro-BNP:  Recent Labs     02/03/21  1505   PROBNP 11,788*     Lab Results   Component Value Date    PROBNP 11,788 (H) 02/03/2021       Last 3 Glucose:     Recent Labs     02/03/21  1505   GLUCOSE 121*       Last 3 Coags:  Recent Labs     02/04/21  0607   PROTIME 12.8*   INR 1.1     Lab Results   Component Value Date    PROTIME 12.8 02/04/2021    INR 1.1 02/04/2021       Last 3 Lipid Panel:  No results for input(s): LDLCALC, HDL, TRIG in the last 72 hours. Invalid input(s): CHLPL  Lab Results   Component Value Date    LDLCALC 93 04/15/2020     Lab Results   Component Value Date    HDL 41 04/15/2020     Lab Results   Component Value Date    TRIG 86 04/15/2020     No components found for: CHLPL    TSH:  Recent Labs     02/03/21  1505   TSH 0.856     Lab Results   Component Value Date    TSH 0.856 02/03/2021       ABGs:  No results for input(s): PH, PO2, PCO2, HCO3, BE, O2SAT in the last 72 hours. Lactic Acid:  Recent Labs     02/03/21  1505   LACTA 2.5*         Radiology:  RAD Results:  XR CHEST PORTABLE   Final Result   No acute process. CT ABDOMEN PELVIS W IV CONTRAST Additional Contrast? None   Final Result   No acute findings in the abdomen pelvis.    No interval change in bilateral lung base nodules since November 2019. EKG and Telemetry:  12-lead EKG personally reviewed and shows atrial fibrillation rate 143, nonspecific STT wave abnormality    Telemetry personally reviewed and shows atrial fib 120's        ASSESSMENT / PLAN:    1. Recent onset atrial fibrillation, but duration unknown. Switched from atenolol to metoprolol for rate control due to renal issues. Probably needs to be at least 50 bid, cardizem gtt for extra rate control. Echocardiogram. [address] with eliquis. If can adequately control rate then [address] 3 weeks and CV if she does not spontaneously do so.  2 Acute HFpEF likely due to afib RVR- normal EF by lexiscan SPECT  9 months ago. Plan to control rate and gentle diuresis. 3 Abdominal pain- probable flair of her diverticulitis  4 MS- thinks she has been having a flair up here too. 5 DAVID/CKD- last hospital visit stopped losartan for Cr, - switched from atenolol to metoprolol for same. Monitor Cr        Thank you for consultation.     Aiden Smith MD, [facility]  The [address] at [facility]    Electronically signed by Aiden Smith MD on 2/4/2021 at 11:17 AM

## 2021-02-05 PROBLEM — N17.9 ACUTE RENAL FAILURE (ARF) (HCC): Status: ACTIVE | Noted: 2021-02-05

## 2021-02-05 PROBLEM — G35 MULTIPLE SCLEROSIS (HCC): Status: ACTIVE | Noted: 2021-02-05

## 2021-02-05 LAB
LV EF: 67 %
LVEF MODALITY: NORMAL

## 2021-02-05 PROCEDURE — 6370000000 HC RX 637 (ALT 250 FOR IP): Performed by: INTERNAL MEDICINE

## 2021-02-05 PROCEDURE — 2580000003 HC RX 258: Performed by: INTERNAL MEDICINE

## 2021-02-05 PROCEDURE — 2500000003 HC RX 250 WO HCPCS: Performed by: EMERGENCY MEDICINE

## 2021-02-05 PROCEDURE — 93306 TTE W/DOPPLER COMPLETE: CPT

## 2021-02-05 PROCEDURE — 2580000003 HC RX 258: Performed by: EMERGENCY MEDICINE

## 2021-02-05 PROCEDURE — 97162 PT EVAL MOD COMPLEX 30 MIN: CPT | Performed by: PHYSICAL THERAPIST

## 2021-02-05 PROCEDURE — 2060000000 HC ICU INTERMEDIATE R&B

## 2021-02-05 PROCEDURE — 6360000004 HC RX CONTRAST MEDICATION: Performed by: INTERNAL MEDICINE

## 2021-02-05 PROCEDURE — 6360000002 HC RX W HCPCS: Performed by: INTERNAL MEDICINE

## 2021-02-05 RX ORDER — LACTOBACILLUS RHAMNOSUS GG 10B CELL
1 CAPSULE ORAL DAILY
Status: DISCONTINUED | OUTPATIENT
Start: 2021-02-05 | End: 2021-02-07 | Stop reason: HOSPADM

## 2021-02-05 RX ORDER — METOPROLOL SUCCINATE 100 MG/1
100 TABLET, EXTENDED RELEASE ORAL 2 TIMES DAILY
Status: DISCONTINUED | OUTPATIENT
Start: 2021-02-05 | End: 2021-02-06

## 2021-02-05 RX ORDER — METHYLPREDNISOLONE SODIUM SUCCINATE 40 MG/ML
40 INJECTION, POWDER, LYOPHILIZED, FOR SOLUTION INTRAMUSCULAR; INTRAVENOUS EVERY 6 HOURS
Status: DISCONTINUED | OUTPATIENT
Start: 2021-02-05 | End: 2021-02-06

## 2021-02-05 RX ORDER — PANTOPRAZOLE SODIUM 40 MG/1
40 TABLET, DELAYED RELEASE ORAL
Status: DISCONTINUED | OUTPATIENT
Start: 2021-02-05 | End: 2021-02-07 | Stop reason: HOSPADM

## 2021-02-05 RX ADMIN — DEXTROSE MONOHYDRATE 15 MG/HR: 50 INJECTION, SOLUTION INTRAVENOUS at 00:36

## 2021-02-05 RX ADMIN — Medication 10 ML: at 10:29

## 2021-02-05 RX ADMIN — PERFLUTREN 1.65 MG: 6.52 INJECTION, SUSPENSION INTRAVENOUS at 11:35

## 2021-02-05 RX ADMIN — METOPROLOL SUCCINATE 100 MG: 100 TABLET, EXTENDED RELEASE ORAL at 21:49

## 2021-02-05 RX ADMIN — Medication 1 CAPSULE: at 12:45

## 2021-02-05 RX ADMIN — DEXTROSE MONOHYDRATE 10 MG/HR: 50 INJECTION, SOLUTION INTRAVENOUS at 06:39

## 2021-02-05 RX ADMIN — SODIUM CHLORIDE: 9 INJECTION, SOLUTION INTRAVENOUS at 00:36

## 2021-02-05 RX ADMIN — SODIUM CHLORIDE, PRESERVATIVE FREE 10 ML: 5 INJECTION INTRAVENOUS at 16:31

## 2021-02-05 RX ADMIN — DEXTROSE MONOHYDRATE 10 MG/HR: 50 INJECTION, SOLUTION INTRAVENOUS at 17:30

## 2021-02-05 RX ADMIN — PANTOPRAZOLE SODIUM 40 MG: 40 TABLET, DELAYED RELEASE ORAL at 06:39

## 2021-02-05 RX ADMIN — SODIUM CHLORIDE: 9 INJECTION, SOLUTION INTRAVENOUS at 10:42

## 2021-02-05 RX ADMIN — METHYLPREDNISOLONE SODIUM SUCCINATE 40 MG: 40 INJECTION, POWDER, FOR SOLUTION INTRAMUSCULAR; INTRAVENOUS at 21:49

## 2021-02-05 RX ADMIN — APIXABAN 5 MG: 5 TABLET, FILM COATED ORAL at 21:49

## 2021-02-05 RX ADMIN — METHYLPREDNISOLONE SODIUM SUCCINATE 40 MG: 40 INJECTION, POWDER, FOR SOLUTION INTRAMUSCULAR; INTRAVENOUS at 10:29

## 2021-02-05 RX ADMIN — PANTOPRAZOLE SODIUM 40 MG: 40 TABLET, DELAYED RELEASE ORAL at 16:30

## 2021-02-05 RX ADMIN — ONDANSETRON 4 MG: 2 INJECTION INTRAMUSCULAR; INTRAVENOUS at 03:44

## 2021-02-05 RX ADMIN — METOPROLOL SUCCINATE 100 MG: 100 TABLET, EXTENDED RELEASE ORAL at 09:18

## 2021-02-05 RX ADMIN — SODIUM CHLORIDE, PRESERVATIVE FREE 10 ML: 5 INJECTION INTRAVENOUS at 03:44

## 2021-02-05 RX ADMIN — APIXABAN 5 MG: 5 TABLET, FILM COATED ORAL at 09:19

## 2021-02-05 RX ADMIN — METHYLPREDNISOLONE SODIUM SUCCINATE 40 MG: 40 INJECTION, POWDER, FOR SOLUTION INTRAMUSCULAR; INTRAVENOUS at 16:30

## 2021-02-05 ASSESSMENT — PAIN SCALES - GENERAL
PAINLEVEL_OUTOF10: 0

## 2021-02-05 NOTE — PROGRESS NOTES
[facility] [address]    Name: Edgar Conklin    Age: 74 y.o. PCP: Nina Kaur MD    Date of Admission: 2/3/2021  3:12 PM    Date of Service: 2/5/2021    Chief Complaint: Follow-up for atrial fibrillation    Interim History:  No new overnight cardiac complaints. Currently with no complaints of CP,  palpitations, dizziness, or lightheadedness. Still with belly pain overnight, some nausea, but no vomiting. Not SOB bed, cardizem gtt at 10    Telemetry personally reviewed and showed afib rates overnight averaged 100-110, up for breakfast she's going 110-140      Review of Systems:   Cardiac: As per HPI  General: No fever, chills  Pulmonary: No cough, wheeze, or shortness of breath  GI: No nausea, vomiting,or abdominal pain  Neuro: No headache or confusion    Problem List:  Active Problems:    Atrial fibrillation with RVR ([address]  Resolved Problems:    * No resolved hospital problems. *      Past Medical History:  History reviewed. No pertinent past medical history.     Allergies:  No Known Allergies    Current Medications:  Current Facility-Administered Medications   Medication Dose Route Frequency Provider Last Rate Last Admin    metoprolol tartrate (LOPRESSOR) tablet 50 mg  50 mg Oral BID Jackson Agrawal MD   50 mg at 02/04/21 2054    0.9 % sodium chloride infusion   Intravenous Continuous Luis Bernardo  mL/hr at 02/05/21 0036 New Bag at 02/05/21 0036    dilTIAZem 100 mg in dextrose 5 % 100 mL infusion (ADD-[place])  5 mg/hr Intravenous Continuous Luis Bernardo MD 10 mL/hr at 02/05/21 0639 10 mg/hr at 02/05/21 0639    sodium chloride flush 0.9 % injection 10 mL  10 mL Intravenous 2 times per day 150 Yamil Pham MD   10 mL at 02/04/21 1401    sodium chloride flush 0.9 % injection 10 mL  10 mL Intravenous  Yamil Pham MD   10 mL at 02/05/21 0344    promethazine (PHENERGAN) tablet 12.5 mg  12.5 mg Oral Q6H  [address], MD        Or    ondansetron [facility]) injection 4 mg  4 mg Intravenous Q6H PRN Danica Camacho MD   4 mg at 02/05/21 0344    polyethylene glycol (GLYCOLAX) packet 17 g  17 g Oral Daily PRN Danica Camacho MD        acetaminophen (TYLENOL) tablet 650 mg  650 mg Oral Q6H PRN Danica Camacho MD        Or   [place] acetaminophen (TYLENOL) suppository 650 mg  650 mg Rectal Q6H PRN Danica Camacho MD        perflutren lipid microspheres (DEFINITY) injection 1.65 mg  1.5 mL Intravenous ONCE PRN Danica Camacho MD        apixaban (ELIQUIS) tablet 5 mg  5 mg Oral BID Danica Camacho MD   5 mg at 02/04/21 2055    pantoprazole (PROTONIX) tablet 40 mg  40 mg Oral QAM AC Danica Camacho MD   40 mg at 02/05/21 9588      sodium chloride 100 mL/hr at 02/05/21 0036    dilTIAZem (CARDIZEM) 100 mg in dextrose 5% 100 mL (ADD-[place]) 10 mg/hr (02/05/21 6260)       Physical Exam:  /60   Pulse 96   Temp 97.9 °F (36.6 °C) (Oral)   Resp 20   Ht 5' 6\" (1.676 m)   Wt 225 lb (102.1 kg)   SpO2 98%   BMI 36.32 kg/m²   Weight change: Wt Readings from Last 3 Encounters:   02/03/21 225 lb (102.1 kg)   04/15/20 226 lb 3.2 oz (102.6 kg)   11/19/19 223 lb (101.2 kg)     General: Awake, alert, oriented x3, no acute distress  Neck: No JVD, carotid bruits, thyromegaly, or lymphadenopathy  Cardiac: Regular rate and rhythm, normal S1 and split S2, no murmurs, no S3 or S4, no pericardial rubs. Carotid upstrokes brisk  Resp: clear right , slight  Rales on left base; unlabored respirations  Abdomen: soft, nontender, nondistended, BS+; no masses, bruits, or hepatomegaly  Extremities: no cyanosis, clubbing, or edema. Distal pulses intact  Skin: Warm and dry, no rashes or lesions  Neuro: moves all extremities to command, no focal deficits noted    Intake/Output:    Intake/Output Summary (Last 24 hours) at 2/5/2021 0755  Last data filed at 2/5/2021 7449  Gross per 24 hour   Intake 2512 ml   Output --   Net 2512 ml     No intake/output data recorded.     Laboratory Tests:  Last 3 CBC:  Recent Labs     02/03/21  1505   WBC 9.9   RBC 4.21   HGB 12.0   HCT 38.0   MCV 90.3   MCH 28.5   MCHC 31.6*   RDW 14.1      MPV 11.5       Last 3 CMP:    Recent Labs     02/03/21  1505      K 3.5      CO2 22   BUN 53*   CREATININE 2.2*   GLUCOSE 121*   CALCIUM 9.4   PROT 7.0   LABALBU 3.9   BILITOT 0.3   ALKPHOS 65   AST 17   ALT 16       Last 3 Mag/Phos:  Recent Labs     02/03/21  1505   MG 1.6       Last 3 CK, CKMB, Troponin  Recent Labs     02/03/21  1505   TROPONINI 0.01       Last 3 BNP:  No results for input(s): BNP in the last 72 hours. No results found for: BNP    Last 3 Glucose:     Recent Labs     02/03/21  1505   GLUCOSE 121*       Last 3 Coags:  Recent Labs     02/04/21  0607   PROTIME 12.8*   INR 1.1     Lab Results   Component Value Date    PROTIME 12.8 02/04/2021    INR 1.1 02/04/2021       Last 3 Lipid Panel:  No results for input(s): LDLCALC, HDL, TRIG in the last 72 hours. Invalid input(s): CHLPL  Lab Results   Component Value Date    LDLCALC 93 04/15/2020     Lab Results   Component Value Date    HDL 41 04/15/2020     Lab Results   Component Value Date    TRIG 86 04/15/2020     No components found for: CHLPL    TSH:  Recent Labs     02/03/21  1505   TSH 0.856     Lab Results   Component Value Date    TSH 0.856 02/03/2021           Radiology:  XR CHEST PORTABLE   Final Result   No acute process. CT ABDOMEN PELVIS W IV CONTRAST Additional Contrast? None   Final Result   No acute findings in the abdomen pelvis. No interval change in bilateral lung base nodules since November 2019. ASSESSMENT / PLAN:  1. Recent onset atrial fibrillation, but duration unknown. Switched from atenolol to metoprolol for rate control due to renal issues. Still on cardizem gtt for extra rate control. Will titrate metoprolol and switch tto succinate. Echocardiogram pending. [address] [place] [address] Road with eliquis.  If can adequately control rate then [address] [place] [address] Road 3 weeks and CV if she does not spontaneously do so.  2          Acute HFpEF likely due to afib RVR- normal EF by lexiscan SPECT  9 months ago. Plan to control rate and gentle diuresis. 3          Abdominal pain- probable flair of her diverticulitis  4          MS- thinks she has been having a flair up here too. 5          DAVID/CKD- last hospital visit stopped losartan for Cr, - switched from atenolol to metoprolol for same.  Monitor Cr                Arnie Self MD, [address] [place] [address] at [facility]    Electronically signed by Arnie Self MD on 2/5/2021 at 7:55 AM

## 2021-02-05 NOTE — PROGRESS NOTES
Physical Therapy    Physical Therapy Initial Assessment     Name: Geovanna Connolly  : 1955  MRN: [id number]    Referring Provider:  [facility], MD    Date of Service: 2021    Evaluating PT:  Erin Jeffries PT, DPT [id number]      Room #:  6037/8633-Z  Diagnosis:  Afib with RVR  PMHx/PSHx:  None documented, pt reports hx MS  Procedure/Surgery:  None this admission  Precautions:  Falls, MS  Equipment Needs:  Pt owns necessary DME    SUBJECTIVE:    Pt lives with her  in a single story house with 3 stairs to enter and 1 rail. Bed is on first floor and bath is on first floor. Pt ambulated with no AD prior to admission. Pt does own several walkers. OBJECTIVE:   Initial Evaluation  Date: 21 Treatment Short Term/ Long Term   Goals   AM-PAC 6 Clicks 96/82     Was pt agreeable to Eval/treatment? yes     Does pt have pain? No c/o pain     Bed Mobility  Rolling: Independent  Supine to sit: Independent  Sit to supine: Independent  Scooting: Independent  Independent, all aspects   Transfers Sit to stand: Supervision  Stand to sit: Supervision  Stand pivot: NT  Sit to stand: Independent  Stand to sit: Independent  Stand pivot: Independent   Ambulation    15 feet with no AD with Min A, reaching for environmental supports  150 feet with FWW with SBA  >300 feet with AAD vs no AD with Mod Independent    Stair negotiation: ascended and descended  NT due to elevated HR with amb on level surface   3 steps with 1 rail Mod Independent    ROM BUE:  WNL  BLE:  WNL     Strength BUE:  WNL  BLE:  WNL     Balance Sitting EOB:  Independent  Dynamic Standing:  SBA with FWW  Sitting EOB:  Independent  Dynamic Standing:   Mod Independent      Pt is A & O x 4  Sensation:  Reports numbness of B feet, L worse than R- attributes to MS flare up  Edema:  unremarkable    Vitals:  133-157 bpm with ambulation on level surfaces    Patient education  Pt educated on role of therapy, safety with mobility, gait with walker    Patient response to education:   Pt verbalized understanding Pt demonstrated skill Pt requires further education in this area   yes yes yes     ASSESSMENT:    Comments:  Pt resting semi-supine upon arrival, agreeable to PT eval. Pt completed bed mobility without difficulty, denies c/o dizziness with postural changes. Pt initially attempting to amb without use of AD, reaching for environmental supports and IV pole with unsteady pattern and single LOB requiring manual assist to recover. Pt agreeable to use of FWW with subsequent improvement in balance and gait mechanics. Pt reports mild SOB despite SPO2 maintained at 98% on room air. Pt agreeable to use of FWW during recovery and verbalized understanding of educational topics. Pt returned to bed upon completion of session at her request with all needs in reach. Pt will benefit from short ocurse of skilled PT services to improve independence with all functional mobility and review safe technique with amb using FWW. Treatment:  Patient practiced and was instructed in the following treatment:    · Transfer training: cues for safe hand placement while using FWW  · Gait training: cues for safety with ambulation, recommendation for use of walker, education for avoidance of furniture walking    Pt's/ family goals   1. To return home independently    Patient and or family understand(s) diagnosis, prognosis, and plan of care. yes    PLAN OF CARE:    Current Treatment Recommendations   [] Strengthening     [] ROM   [x] Balance Training   [x] Endurance Training   [x] Transfer Training   [x] Gait Training   [x] Stair Training   [] Positioning   [x] Safety and Education Training   [x] Patient/Caregiver Education   [] HEP  [] Other       PT care will be provided in accordance with the objectives noted above. Whenever appropriate, clear delegation orders will be provided for nursing staff.   Exercises and functional mobility practice will be used as well as appropriate assistive devices or modalities to obtain goals. Patient and family education will also be administered as needed. Frequency of treatments: 2-5x/week x 3-5 days. Time in  1550  Time out  1605      Evaluation Time includes thorough review of current medical information, gathering information on past medical history/social history and prior level of function, completion of standardized testing/informal observation of tasks, assessment of data and education on plan of care and goals.     CPT codes:  [] Low Complexity PT evaluation 15828  [x] Moderate Complexity PT evaluation 73062  [] High Complexity PT evaluation 19528  [] PT Re-evaluation 78209  [] Gait training 50145 0 minutes  [] Manual therapy 65872 0 minutes  [] Therapeutic activities 46202 80 minutes  [] Therapeutic exercises 92241 0 minutes  [] Neuromuscular reeducation 95240 0 minutes     Simeon Jones, PT, DPT  [id number]

## 2021-02-05 NOTE — CARE COORDINATION
Met with patient at bedside to discuss transition of care. She lives independently with her . No assistive devices. Pt stated she is independent with ADL's. PCP . Pharmacy [facility]. Pt is planning to return home at discharge with no anticipated needs. Started on Eliquis, given 30 day free trial card. She is concerned about cost. I did explain the card will give a free month supply and her PCP can make adjustments if needed. She is agreeable.   CM to follow       Derrick GREGORYN, RN  [facility] Case Management  [phone]

## 2021-02-05 NOTE — PROGRESS NOTES
Subjective: The patient is awake and alert. No acute events overnight. Denies chest pain, angina, SOB   Abdominal pain is improved today  Still with slight palpitations    Objective:    /62   Pulse 125   Temp 98.4 °F (36.9 °C) (Oral)   Resp 20   Ht 5' 6\" (1.676 m)   Wt 225 lb (102.1 kg)   SpO2 98%   BMI 36.32 kg/m²     In: 2697.2 [P.O.:1200; I.V.:1497.2]  Out: 0   In: 2697.2   Out: 0     General appearance: NAD, conversant  HEENT: AT/NC, MMM  Neck: FROM, supple  Lungs: Clear to auscultation  CV: RRR, no MRGs  Vasc: Radial pulses 2+  Abdomen: Soft, non-tender; no masses or HSM  Extremities: No peripheral edema or digital cyanosis  Skin: no rash, lesions or ulcers  Psych: Alert and oriented to person, place and time  Neuro: Alert and interactive     Recent Labs     02/03/21  1505   WBC 9.9   HGB 12.0   HCT 38.0          Recent Labs     02/03/21  1505      K 3.5      CO2 22   BUN 53*   CREATININE 2.2*   CALCIUM 9.4       Assessment:    Active Problems:    Atrial fibrillation with RVR (HCC)    Multiple sclerosis (HCC)    Acute renal failure (ARF) (HCC)  Resolved Problems:    * No resolved hospital problems. *      Plan:    Admit to telemetry for evaluation of atrial fibrillation with RVR  IV Cardizem to be weaned off and transition to p.o. Oral anticoagulation given [id number]  score of 2-3-initiate Eliquis 5 twice daily, renal adjustment as needed  Echocardiogram given elevated BNP-echo is essentially unremarkable with ejection fraction greater than 60%  Check pancultures  Cards following  DCCV in upcoming weeks at discretion of cards       Acute renal failure  IV fluid hydration with caution secondary to CHF  Stop nephrotoxins    Multiple sclerosis flareup?   Trial 60 every 6 of Solu-Medrol with quick taper  Patient has diffuse aches numbness tingling and abdominal pain with negative abdominal CT    DVT Prophylaxis   PT/OT  Discharge planning           Levi Vick MD  6:39 PM  2/5/2021

## 2021-02-06 LAB
ANION GAP SERPL CALCULATED.3IONS-SCNC: 13 MMOL/L (ref 7–16)
BASOPHILS ABSOLUTE: 0 E9/L (ref 0–0.2)
BASOPHILS RELATIVE PERCENT: 0 % (ref 0–2)
BUN BLDV-MCNC: 27 MG/DL (ref 8–23)
CALCIUM SERPL-MCNC: 8.4 MG/DL (ref 8.6–10.2)
CHLORIDE BLD-SCNC: 108 MMOL/L (ref 98–107)
CO2: 21 MMOL/L (ref 22–29)
CREAT SERPL-MCNC: 1.3 MG/DL (ref 0.5–1)
EOSINOPHILS ABSOLUTE: 0 E9/L (ref 0.05–0.5)
EOSINOPHILS RELATIVE PERCENT: 0 % (ref 0–6)
GFR AFRICAN AMERICAN: 49
GFR NON-AFRICAN AMERICAN: 41 ML/MIN/1.73
GLUCOSE BLD-MCNC: 191 MG/DL (ref 74–99)
HCT VFR BLD CALC: 31.7 % (ref 34–48)
HEMOGLOBIN: 10.2 G/DL (ref 11.5–15.5)
IMMATURE GRANULOCYTES #: 0.1 E9/L
IMMATURE GRANULOCYTES %: 1 % (ref 0–5)
LYMPHOCYTES ABSOLUTE: 0.85 E9/L (ref 1.5–4)
LYMPHOCYTES RELATIVE PERCENT: 8.9 % (ref 20–42)
MCH RBC QN AUTO: 29.1 PG (ref 26–35)
MCHC RBC AUTO-ENTMCNC: 32.2 % (ref 32–34.5)
MCV RBC AUTO: 90.3 FL (ref 80–99.9)
MONOCYTES ABSOLUTE: 0.15 E9/L (ref 0.1–0.95)
MONOCYTES RELATIVE PERCENT: 1.6 % (ref 2–12)
NEUTROPHILS ABSOLUTE: 8.49 E9/L (ref 1.8–7.3)
NEUTROPHILS RELATIVE PERCENT: 88.5 % (ref 43–80)
PDW BLD-RTO: 14.3 FL (ref 11.5–15)
PLATELET # BLD: 207 E9/L (ref 130–450)
PMV BLD AUTO: 11.5 FL (ref 7–12)
POTASSIUM SERPL-SCNC: 4 MMOL/L (ref 3.5–5)
RBC # BLD: 3.51 E12/L (ref 3.5–5.5)
SODIUM BLD-SCNC: 142 MMOL/L (ref 132–146)
WBC # BLD: 9.6 E9/L (ref 4.5–11.5)

## 2021-02-06 PROCEDURE — 2580000003 HC RX 258: Performed by: INTERNAL MEDICINE

## 2021-02-06 PROCEDURE — 36415 COLL VENOUS BLD VENIPUNCTURE: CPT

## 2021-02-06 PROCEDURE — 6360000002 HC RX W HCPCS: Performed by: INTERNAL MEDICINE

## 2021-02-06 PROCEDURE — 85025 COMPLETE CBC W/AUTO DIFF WBC: CPT

## 2021-02-06 PROCEDURE — 6370000000 HC RX 637 (ALT 250 FOR IP): Performed by: INTERNAL MEDICINE

## 2021-02-06 PROCEDURE — 80048 BASIC METABOLIC PNL TOTAL CA: CPT

## 2021-02-06 PROCEDURE — 2580000003 HC RX 258: Performed by: EMERGENCY MEDICINE

## 2021-02-06 PROCEDURE — 2060000000 HC ICU INTERMEDIATE R&B

## 2021-02-06 PROCEDURE — 2500000003 HC RX 250 WO HCPCS: Performed by: EMERGENCY MEDICINE

## 2021-02-06 RX ORDER — METHYLPREDNISOLONE SODIUM SUCCINATE 40 MG/ML
40 INJECTION, POWDER, LYOPHILIZED, FOR SOLUTION INTRAMUSCULAR; INTRAVENOUS EVERY 8 HOURS
Status: DISCONTINUED | OUTPATIENT
Start: 2021-02-06 | End: 2021-02-07

## 2021-02-06 RX ORDER — DIGOXIN 0.25 MG/ML
250 INJECTION INTRAMUSCULAR; INTRAVENOUS EVERY 8 HOURS SCHEDULED
Status: COMPLETED | OUTPATIENT
Start: 2021-02-06 | End: 2021-02-06

## 2021-02-06 RX ADMIN — PANTOPRAZOLE SODIUM 40 MG: 40 TABLET, DELAYED RELEASE ORAL at 16:00

## 2021-02-06 RX ADMIN — METOPROLOL SUCCINATE 125 MG: 100 TABLET, EXTENDED RELEASE ORAL at 21:39

## 2021-02-06 RX ADMIN — METHYLPREDNISOLONE SODIUM SUCCINATE 40 MG: 40 INJECTION, POWDER, FOR SOLUTION INTRAMUSCULAR; INTRAVENOUS at 18:14

## 2021-02-06 RX ADMIN — DEXTROSE MONOHYDRATE 12.5 MG/HR: 50 INJECTION, SOLUTION INTRAVENOUS at 09:32

## 2021-02-06 RX ADMIN — SODIUM CHLORIDE: 9 INJECTION, SOLUTION INTRAVENOUS at 05:59

## 2021-02-06 RX ADMIN — DIGOXIN 250 MCG: 0.25 INJECTION INTRAMUSCULAR; INTRAVENOUS at 09:00

## 2021-02-06 RX ADMIN — DIGOXIN 250 MCG: 0.25 INJECTION INTRAMUSCULAR; INTRAVENOUS at 21:39

## 2021-02-06 RX ADMIN — DEXTROSE MONOHYDRATE 15 MG/HR: 50 INJECTION, SOLUTION INTRAVENOUS at 03:16

## 2021-02-06 RX ADMIN — Medication 10 ML: at 21:43

## 2021-02-06 RX ADMIN — METHYLPREDNISOLONE SODIUM SUCCINATE 40 MG: 40 INJECTION, POWDER, FOR SOLUTION INTRAMUSCULAR; INTRAVENOUS at 10:21

## 2021-02-06 RX ADMIN — RIVAROXABAN 15 MG: 15 TABLET, FILM COATED ORAL at 18:14

## 2021-02-06 RX ADMIN — Medication 10 ML: at 09:33

## 2021-02-06 RX ADMIN — PANTOPRAZOLE SODIUM 40 MG: 40 TABLET, DELAYED RELEASE ORAL at 06:46

## 2021-02-06 RX ADMIN — SODIUM CHLORIDE: 9 INJECTION, SOLUTION INTRAVENOUS at 16:00

## 2021-02-06 RX ADMIN — METHYLPREDNISOLONE SODIUM SUCCINATE 40 MG: 40 INJECTION, POWDER, FOR SOLUTION INTRAMUSCULAR; INTRAVENOUS at 05:59

## 2021-02-06 RX ADMIN — METOPROLOL SUCCINATE 125 MG: 100 TABLET, EXTENDED RELEASE ORAL at 09:32

## 2021-02-06 RX ADMIN — Medication 1 CAPSULE: at 09:32

## 2021-02-06 RX ADMIN — DEXTROSE MONOHYDRATE 5 MG/HR: 50 INJECTION, SOLUTION INTRAVENOUS at 18:14

## 2021-02-06 RX ADMIN — DIGOXIN 250 MCG: 0.25 INJECTION INTRAMUSCULAR; INTRAVENOUS at 14:37

## 2021-02-06 ASSESSMENT — PAIN SCALES - GENERAL: PAINLEVEL_OUTOF10: 0

## 2021-02-06 NOTE — PROGRESS NOTES
Subjective: The patient is awake and alert. No acute events overnight. Denies chest pain, angina, SOB   Abdominal pain is improved today  Still with tachycardia  And comfortably in no apparent acute distress, indicates MS symptoms of bilateral lower extremity weakness and numbness tingling feels much better today  Complaining of some left-sided chest discomfort/upper abdominal discomfort    Objective:    /66   Pulse 130   Temp 97.5 °F (36.4 °C) (Temporal)   Resp 20   Ht 5' 6\" (1.676 m)   Wt 225 lb (102.1 kg)   SpO2 97%   BMI 36.32 kg/m²     In: 2263.7 [P.O.:600; I.V.:1663.7]  Out: -   In: 2263.7   Out: -     General appearance: NAD, conversant  HEENT: AT/NC, MMM  Neck: FROM, supple  Lungs: Clear to auscultation  CV: Irregular, tach, no MRGs  Vasc: Radial pulses 2+  Abdomen: Soft, non-tender; no masses or HSM  Extremities: No peripheral edema or digital cyanosis  Skin: no rash, lesions or ulcers  Psych: Alert and oriented to person, place and time  Neuro: Alert and interactive     Recent Labs     02/03/21  1505 02/06/21  0500   WBC 9.9 9.6   HGB 12.0 10.2*   HCT 38.0 31.7*    207       Recent Labs     02/03/21  1505 02/06/21  0501    142   K 3.5 4.0    108*   CO2 22 21*   BUN 53* 27*   CREATININE 2.2* 1.3*   CALCIUM 9.4 8.4*       Assessment:    Active Problems:    Atrial fibrillation with RVR (HCC)    Multiple sclerosis (HCC)    Acute renal failure (ARF) (HCC)  Resolved Problems:    * No resolved hospital problems. *      Plan:    Admit to telemetry for evaluation of atrial fibrillation with RVR  IV Cardizem to be weaned off and transition to p.o.   Oral anticoagulation given [id number]  score of 2-3-initiate Eliquis 5 twice daily, renal adjustment as needed  Echocardiogram given elevated BNP-echo is essentially unremarkable with ejection fraction greater than 60%  Check pancultures  Cards following  DCCV in upcoming weeks at discretion of cards       Acute renal failure  IV fluid hydration-improved, okay to discontinue fluids  Stop nephrotoxins    Multiple sclerosis flareup?   Trial 60 every 6 of Solu-Medrol with quick taper-start taper today  Patient has diffuse aches numbness tingling and abdominal pain with negative abdominal CT    DVT Prophylaxis   PT/OT  Discharge planning           Adi Good MD  10:01 AM  2/6/2021

## 2021-02-06 NOTE — PROGRESS NOTES
[facility] [address]    Name: Akila Randall    Age: 74 y.o. PCP: Shruti Pino MD    Date of Admission: 2/3/2021  3:12 PM    Date of Service: 2/6/2021    Chief Complaint: Follow-up for atrial fibrillation    2/6/2021 interim History:  No new overnight cardiac complaints. Currently with no complaints of CP,  palpitations, dizziness, or lightheadedness. Telemetry personally reviewed and showed afib rates overnight averaged 100-120 and still on IV diltiazem infusion  Echo as below creatinine 2.2>1.3    Review of Systems:   Cardiac: As per HPI  General: No fever, chills  Pulmonary: No cough, wheeze, or shortness of breath  GI: No nausea, vomiting,or abdominal pain  Neuro: No headache or confusion    Problem List:  Active Problems:    Atrial fibrillation with RVR (HCC)    Multiple sclerosis (HCC)    Acute renal failure (ARF) (HCC)  Resolved Problems:    * No resolved hospital problems. *      Past Medical History:  History reviewed. No pertinent past medical history.     Allergies:  No Known Allergies    Current Medications:  Current Facility-Administered Medications   Medication Dose Route Frequency Provider Last Rate Last Admin    metoprolol succinate (TOPROL XL) extended release tablet 100 mg  100 mg Oral BID Daune Baumgarten, MD   100 mg at 02/05/21 2149    lactobacillus (CULTURELLE) capsule 1 capsule  1 capsule Oral Daily Serina Washburn MD   1 capsule at 02/05/21 1245    pantoprazole (PROTONIX) tablet 40 mg  40 mg Oral BID AC Serina Washburn MD   40 mg at 02/06/21 0646    methylPREDNISolone sodium (SOLU-MEDROL) injection 40 mg  40 mg Intravenous Q6H Serina Washburn MD   40 mg at 02/06/21 0559    0.9 % sodium chloride infusion   Intravenous Continuous Brittany Crawford  mL/hr at 02/06/21 0559 New Bag at 02/06/21 0559    dilTIAZem 100 mg in dextrose 5 % 100 mL infusion (ADD-[place])  5 mg/hr Intravenous Continuous Brittany Crawford MD 15 mL/hr at 02/06/21 0316 15 mg/hr at 02/06/21 0316    sodium chloride flush 0.9 % injection 10 mL  10 mL Intravenous 2 times per day Denver Heckle, MD   10 mL at 02/05/21 1029    sodium chloride flush 0.9 % injection 10 mL  10 mL Intravenous PRN Denver Heckle, MD   10 mL at 02/05/21 1631    promethazine (PHENERGAN) tablet 12.5 mg  12.5 mg Oral Q6H PRN Denver Heckle, MD        Or    ondansetron [facility]) injection 4 mg  4 mg Intravenous Q6H PRN Denver Heckle, MD   4 mg at 02/05/21 0344    polyethylene glycol (GLYCOLAX) packet 17 g  17 g Oral Daily PRN Denver Heckle, MD        acetaminophen (TYLENOL) tablet 650 mg  650 mg Oral Q6H PRN Denver Heckle, MD        Or   [place] acetaminophen (TYLENOL) suppository 650 mg  650 mg Rectal Q6H PRN Denver Heckle, MD        apixaban (ELIQUIS) tablet 5 mg  5 mg Oral BID Denver Heckle, MD   5 mg at 02/05/21 2149      sodium chloride 100 mL/hr at 02/06/21 0559    dilTIAZem (CARDIZEM) 100 mg in dextrose 5% 100 mL (ADD-[place]) 15 mg/hr (02/06/21 0316)       Physical Exam:  BP (!) 132/97   Pulse 119   Temp 96.8 °F (36 °C) (Temporal)   Resp 18   Ht 5' 6\" (1.676 m)   Wt 225 lb (102.1 kg)   SpO2 98%   BMI 36.32 kg/m²   Weight change: Wt Readings from Last 3 Encounters:   02/03/21 225 lb (102.1 kg)   04/15/20 226 lb 3.2 oz (102.6 kg)   11/19/19 223 lb (101.2 kg)     General: Awake, alert, oriented x3, no acute distress  Neck: No JVD, carotid bruits,   Cardiac: Tachycardic with an irregularly irregular rhythm normal S1 and split S2, no murmurs, no S3 or S4, no pericardial rubs. Carotid upstrokes brisk  Resp: Unlabored respirations, clear bilaterally  Abdomen: soft, nontender, nondistended, BS+; no masses, bruits, or hepatomegaly  Extremities: no cyanosis, clubbing, or edema.   Distal pulses intact  Skin: Warm and dry,  Neuro: moves all extremities to command, no focal deficits noted, alert and oriented x3    Intake/Output:    Intake/Output Summary (Last 24 hours) at 2/6/2021 5463  Last data filed at 2/6/2021 8123  Gross wall motion. Indeterminate diastolic function. Ejection fraction is visually estimated at >60%(calculated 64-70% by   [employer])%. Normal right ventricular size and function. The left atrium is mildly dilated. Mildly enlarged right atrium size. Mild mitral regurgitation is present. Mild to Moderate tricuspid regurgitation. Signature      ----------------------------------------------------------------   Electronically signed by Gladis Brewer MD(Interpreting   physician) on 02/05/2021 01:03 PM   ----------------------------------------------------------------           ASSESSMENT / PLAN:  1. Recent onset atrial fibrillation, but duration unknown. Switched from atenolol to metoprolol for rate control due to renal issues. Still on cardizem gtt with RVR. Increase metoprolol XL, and digoxin and taper diltiazem off. Her formulary only covers Xarelto, and will start low-dose due to GFR less than 50    2          Acute HFpEF likely due to afib RVR-stabilized.   LVEF normal with mild regurgitant valvular heart disease    3          Abdominal pain-resolved    4          MS- thinks she has been having a flair     5          DAVID/CKD-creatinine rapidly returning to baseline                Zoran Gonzalez MD, [address] [place] [address] at [facility]    Electronically signed by Zoran Gonzalez MD on 2/6/2021 at 7:59 AM

## 2021-02-06 NOTE — PROGRESS NOTES
Patient educated on the following medications; Toprol XL, Solumedrol, Xarelto and Digoxin. Handouts provided and reviewed with patient.

## 2021-02-07 VITALS
DIASTOLIC BLOOD PRESSURE: 78 MMHG | BODY MASS INDEX: 37.61 KG/M2 | RESPIRATION RATE: 18 BRPM | HEIGHT: 66 IN | WEIGHT: 234 LBS | HEART RATE: 92 BPM | OXYGEN SATURATION: 98 % | SYSTOLIC BLOOD PRESSURE: 120 MMHG | TEMPERATURE: 97.2 F

## 2021-02-07 PROCEDURE — 6370000000 HC RX 637 (ALT 250 FOR IP): Performed by: INTERNAL MEDICINE

## 2021-02-07 PROCEDURE — 6360000002 HC RX W HCPCS: Performed by: INTERNAL MEDICINE

## 2021-02-07 PROCEDURE — 2580000003 HC RX 258: Performed by: INTERNAL MEDICINE

## 2021-02-07 PROCEDURE — 2500000003 HC RX 250 WO HCPCS: Performed by: EMERGENCY MEDICINE

## 2021-02-07 PROCEDURE — 2580000003 HC RX 258: Performed by: EMERGENCY MEDICINE

## 2021-02-07 RX ORDER — METOPROLOL SUCCINATE 50 MG/1
150 TABLET, EXTENDED RELEASE ORAL 2 TIMES DAILY
Qty: 30 TABLET | Refills: 3 | Status: ON HOLD | OUTPATIENT
Start: 2021-02-07 | End: 2022-05-13 | Stop reason: HOSPADM

## 2021-02-07 RX ORDER — METHYLPREDNISOLONE SODIUM SUCCINATE 40 MG/ML
40 INJECTION, POWDER, LYOPHILIZED, FOR SOLUTION INTRAMUSCULAR; INTRAVENOUS EVERY 12 HOURS
Status: DISCONTINUED | OUTPATIENT
Start: 2021-02-07 | End: 2021-02-07 | Stop reason: HOSPADM

## 2021-02-07 RX ORDER — DIGOXIN 125 MCG
125 TABLET ORAL DAILY
Qty: 30 TABLET | Refills: 3 | Status: SHIPPED | OUTPATIENT
Start: 2021-02-08

## 2021-02-07 RX ORDER — DIGOXIN 125 MCG
125 TABLET ORAL DAILY
Status: DISCONTINUED | OUTPATIENT
Start: 2021-02-07 | End: 2021-02-07 | Stop reason: HOSPADM

## 2021-02-07 RX ORDER — PREDNISONE 20 MG/1
40 TABLET ORAL DAILY
Qty: 20 TABLET | Refills: 0 | Status: SHIPPED | OUTPATIENT
Start: 2021-02-07 | End: 2021-02-17

## 2021-02-07 RX ORDER — LACTOBACILLUS RHAMNOSUS GG 10B CELL
1 CAPSULE ORAL DAILY
Qty: 30 CAPSULE | Refills: 0 | Status: SHIPPED | OUTPATIENT
Start: 2021-02-08

## 2021-02-07 RX ADMIN — ONDANSETRON 4 MG: 2 INJECTION INTRAMUSCULAR; INTRAVENOUS at 01:10

## 2021-02-07 RX ADMIN — PANTOPRAZOLE SODIUM 40 MG: 40 TABLET, DELAYED RELEASE ORAL at 06:31

## 2021-02-07 RX ADMIN — SODIUM CHLORIDE: 9 INJECTION, SOLUTION INTRAVENOUS at 01:10

## 2021-02-07 RX ADMIN — Medication 1 CAPSULE: at 09:43

## 2021-02-07 RX ADMIN — DEXTROSE MONOHYDRATE 10 MG/HR: 50 INJECTION, SOLUTION INTRAVENOUS at 05:28

## 2021-02-07 RX ADMIN — METOPROLOL SUCCINATE 150 MG: 100 TABLET, EXTENDED RELEASE ORAL at 09:43

## 2021-02-07 RX ADMIN — METHYLPREDNISOLONE SODIUM SUCCINATE 40 MG: 40 INJECTION, POWDER, FOR SOLUTION INTRAMUSCULAR; INTRAVENOUS at 01:10

## 2021-02-07 RX ADMIN — DIGOXIN 125 MCG: 125 TABLET ORAL at 09:43

## 2021-02-07 RX ADMIN — SODIUM CHLORIDE, PRESERVATIVE FREE 10 ML: 5 INJECTION INTRAVENOUS at 01:11

## 2021-02-07 RX ADMIN — METHYLPREDNISOLONE SODIUM SUCCINATE 40 MG: 40 INJECTION, POWDER, FOR SOLUTION INTRAMUSCULAR; INTRAVENOUS at 10:10

## 2021-02-07 RX ADMIN — Medication 10 ML: at 09:43

## 2021-02-07 ASSESSMENT — PAIN SCALES - GENERAL
PAINLEVEL_OUTOF10: 0
PAINLEVEL_OUTOF10: 0

## 2021-02-07 NOTE — PROGRESS NOTES
All discharge paperwork reviewed with patient. She appeared to understand all instructions, asking good questions. Well baby exam, under 8 days old

## 2021-02-07 NOTE — PROGRESS NOTES
[facility] [address]    Name: Damien Toure    Age: 74 y.o. PCP: Adam Chavira MD    Date of Admission: 2/3/2021  3:12 PM    Date of Service: 2/7/2021    Chief Complaint: Follow-up for atrial fibrillation    2/6/2021 interim History:  No new overnight cardiac complaints. Currently with no complaints of CP,  palpitations, dizziness, or lightheadedness. Telemetry personally reviewed and showed afib rates overnight averaged 100-120 and still on IV diltiazem infusion  Echo as below creatinine 2.2>1.3    2/7/2021 interim history  No complaints. Ambulating in the room without chest pain, palpitations or shortness of breath. Still on IV diltiazem at 5 mg/h. Metoprolol dose increased again today. Received digoxin loading yesterday      Review of Systems:   Cardiac: As per HPI  General: No fever, chills  Pulmonary: No cough, wheeze, or shortness of breath  GI: No nausea, vomiting,or abdominal pain  Neuro: No headache or confusion    Problem List:  Active Problems:    Atrial fibrillation with RVR (HCC)    Multiple sclerosis (HCC)    Acute renal failure (ARF) ([facility] [address]  Resolved Problems:    * No resolved hospital problems. *      Past Medical History:  History reviewed. No pertinent past medical history.     Allergies:  No Known Allergies    Current Medications:  Current Facility-Administered Medications   Medication Dose Route Frequency Provider Last Rate Last Admin    metoprolol succinate (TOPROL XL) extended release tablet 150 mg  150 mg Oral BID Abebe Vines MD   150 mg at 02/07/21 3513    digoxin (LANOXIN) tablet 125 mcg  125 mcg Oral Daily Abebe Vines MD   125 mcg at 02/07/21 7167    rivaroxaban (XARELTO) tablet 15 mg  15 mg Oral Daily Abebe Vines MD   15 mg at 02/06/21 1814    methylPREDNISolone sodium (SOLU-MEDROL) injection 40 mg  40 mg Intravenous Rima Ferrara MD   40 mg at 02/07/21 0110    lactobacillus (CULTURELLE) capsule 1 capsule  1 capsule Oral Daily Margie Bowling MD   1 capsule at 02/07/21 6826    pantoprazole (PROTONIX) tablet 40 mg  40 mg Oral BID AC Margie Bowling MD   40 mg at 02/07/21 0631    0.9 % sodium chloride infusion   Intravenous Continuous Narinder Galeano  mL/hr at 02/07/21 0110 New Bag at 02/07/21 0110    dilTIAZem 100 mg in dextrose 5 % 100 mL infusion ([place])  5 mg/hr Intravenous Continuous Narinder Galeano MD 5 mL/hr at 02/07/21 0809 5 mg/hr at 02/07/21 0809    sodium chloride flush 0.9 % injection 10 mL  10 mL Intravenous 2 times per day Nuvia Corbin MD   10 mL at 02/07/21 0943    sodium chloride flush 0.9 % injection 10 mL  10 mL Intravenous PRN Nuvia Corbin MD   10 mL at 02/07/21 0111    promethazine (PHENERGAN) tablet 12.5 mg  12.5 mg Oral Q6H PRN Nuvia Corbin MD        Or    ondansetron [facility]) injection 4 mg  4 mg Intravenous Q6H PRN Nuvia Corbin MD   4 mg at 02/07/21 0110    polyethylene glycol (GLYCOLAX) packet 17 g  17 g Oral Daily PRN Nuvia Corbin MD        acetaminophen (TYLENOL) tablet 650 mg  650 mg Oral Q6H PRN Nuvia Corbin MD        Or    acetaminophen (TYLENOL) suppository 650 mg  650 mg Rectal Q6H PRN Nuvia Corbin MD          sodium chloride 100 mL/hr at 02/07/21 0110    dilTIAZem (CARDIZEM) 100 mg in dextrose 5% 100 mL ([facility]) 5 mg/hr (02/07/21 0809)       Physical Exam:  /68   Pulse 100   Temp 98.2 °F (36.8 °C) (Oral)   Resp 18   Ht 5' 6\" (1.676 m)   Wt 234 lb (106.1 kg)   SpO2 97%   BMI 37.77 kg/m²   Weight change: Wt Readings from Last 3 Encounters:   02/07/21 234 lb (106.1 kg)   04/15/20 226 lb 3.2 oz (102.6 kg)   11/19/19 223 lb (101.2 kg)     General: Awake, alert, oriented x3, no acute distress  Neck: No JVD, carotid bruits,   Cardiac:  irregularly irregular rhythm normal S1 and split S2, no murmurs, no S3 or S4, no pericardial rubs.   Carotid upstrokes brisk  Resp: Unlabored respirations, clear bilaterally  Abdomen: soft, nontender, nondistended, BS+; no masses, change in bilateral lung base nodules since November 2019. Echo summary   Mild left ventricular concentric hypertrophy noted. Normal LV segmental wall motion. Indeterminate diastolic function. Ejection fraction is visually estimated at >60%(calculated 64-70% by   [place])%. Normal right ventricular size and function. The left atrium is mildly dilated. Mildly enlarged right atrium size. Mild mitral regurgitation is present. Mild to Moderate tricuspid regurgitation. Signature      ----------------------------------------------------------------   Electronically signed by Cleo Parker MD(Interpreting   physician) on 02/05/2021 01:03 PM   ----------------------------------------------------------------           ASSESSMENT / PLAN:  1. Recent onset atrial fibrillation, but duration unknown. Switched from atenolol to metoprolol for rate control due to renal issues. Still on cardizem gtt . Increase metoprolol XL to 150 mg twice daily, and continue digoxin 0.125 mg daily, and taper diltiazem off. Home this afternoon if heart rates acceptable. Her formulary only covers Xarelto, and will start low-dose due to GFR less than 50    2          Acute HFpEF likely due to afib RVR-stabilized.   LVEF normal with mild regurgitant valvular heart disease    3          Abdominal pain-resolved    4          MS- thinks she has been having a flair     5          DAVID/CKD-creatinine rapidly returning to baseline                Louie Jorge MD, Meagan Witt at [facility]    Electronically signed by Louie Jorge MD on 2/7/2021 at 10:01 AM

## 2021-02-08 ENCOUNTER — CARE COORDINATION (OUTPATIENT)
Dept: CASE MANAGEMENT | Age: 66
End: 2021-02-08

## 2021-02-08 NOTE — CARE COORDINATION
[facility] Transitions Initial Follow Up Call    Call within 2 business days of discharge: Yes    Patient: Joshua Hayden Patient : 1955   MRN: [id number]  Reason for Admission: AFib with RVR  Discharge Date: 21 RARS: Readmission Risk Score: 15      Last Discharge [address] [address]       Complaint Diagnosis Description Type Department Provider    2/3/21 Abdominal Pain; Shortness of Breath Atrial fibrillation with rapid ventricular response ([address] . .. ED to Hosp-Admission (Discharged) (ADMITTED) [facility] Tara Kruse MD; Boston Garcia... Challenges to be reviewed by the provider   Additional needs identified to be addressed with provider No  none    Discussed COVID-19 related testing which was not done at this time. Test results were not done. Patient informed of results, if available? N/A         Method of communication with provider : none    Advance Care Planning:   Does patient have an Advance Directive:  decision maker updated. Was this a readmission? No  Patient stated reason for admission: abdominal pain, SOB and palpitations  Patients top risk factors for readmission: medical condition, multiple health system providers and polypharmacy    Care Transition Nurse (CTN) contacted the patient by telephone to perform post hospital discharge assessment. Verified name and  with patient as identifiers. Provided introduction to self, and explanation of the CTN role. CTN reviewed discharge instructions, medical action plan and red flags with patient who verbalized understanding. Patient given an opportunity to ask questions and does not have any further questions or concerns at this time. Were discharge instructions available to patient? Yes. Reviewed appropriate site of care based on symptoms and resources available to patient including: PCP and Specialist. The patient agrees to contact the PCP office for questions related to their healthcare.      Medication reconciliation was performed with patient, who verbalizes understanding of administration of home medications. [facility]. Advised obtaining a 90-day supply of all daily and as-needed medications. CTN also confirmed with patient stopped medications as per AVS.    Discussed follow-up appointments. If no appointment was previously scheduled, appointment scheduling offered: N/A. Is follow up appointment scheduled within 7 days of discharge? Yes  Non-[facility] follow up appointment(s): PCP(Dr Angulo)this Friday 2/12/21. Patient to call cardiology(Dr Sierra)if she does not hear from office today. Non-face-to-face services provided:  Obtained and reviewed discharge summary and/or continuity of care documents    Care Transitions 24 Hour Call    Do you have any ongoing symptoms?: No  Do you have a copy of your discharge instructions?: Yes  Do you have all of your prescriptions and are they filled?: Yes  Have you been contacted by a [address]?: No  Have you scheduled your follow up appointment?: Yes  Were you discharged with any Home Care or Post Acute Services: No  Do you feel like you have everything you need to keep you well at home?: Yes  Care Transitions Interventions  No Identified Needs       -Spoke with patient for initial BPCI care transition call post hospital discharge. Explained the role of Care Transition Nurse and the BPCI-A program, patient is agreeable to follow up post discharge from the hospital.  -Patient states she is \"doing good and taking it slow. \"  Patient denies any palpitations or abdominal pain, admits to some SOB with exertion. -CTN explained that a member of the Care Transition Central Team will be contacting her for further follow up calls, patient is in agreement and denies any other needs or concerns at this time. -CTN will hand off to the Care Transition Central team to follow.            Follow Up  Future Appointments   Date Time Provider Millie Man   2/24/2021 1:40 PM [facility]ove.[facility]       Colleen Bucio RN

## 2021-02-15 ENCOUNTER — CARE COORDINATION (OUTPATIENT)
Dept: CASE MANAGEMENT | Age: 66
End: 2021-02-15

## 2021-02-15 NOTE — DISCHARGE SUMMARY
Physician Discharge Summary     Patient ID:  Akila Randall  [id number]  74 y.o.  1955    Admit date: 2/3/2021    Discharge date and time: 2/07   Admission Diagnoses:   Patient Active Problem List   Diagnosis    Abnormal EKG    Chest pain    Dyspnea    Nausea    GERD (gastroesophageal reflux disease)    Obesity    Atrial fibrillation with RVR (HCC)    Multiple sclerosis (HCC)    Acute renal failure (ARF) ([facility] [address]       Discharge Diagnoses: MS flare, chest pain / abnormal; EKG , atrial fib rvr   Consults: cards     Procedures:none     Hospital Course:     Admit to telemetry for evaluation of atrial fibrillation with RVR  IV Cardizem to be weaned off and transition to p.o. toprol and digoxin   Oral anticoagulation given [id number]  score of 2-3-initiate Xarelto bid renal dosing , renal adjustment as needed  Echocardiogram given elevated BNP-echo is essentially unremarkable with ejection fraction greater than 60%  Check pancultures  Cards following  DCCV in upcoming weeks at discretion of cards       Acute renal failure  IV fluid hydration-improved, okay to discontinue fluids  Stop nephrotoxins     Multiple sclerosis flareup? Trial 60 every 6 of Solu-Medrol with quick taper-start taper to po steroids at SD   Patient has diffuse aches numbness tingling and abdominal pain with negative abdominal CT    st tomi and ready for home        No results for input(s): WBC, HGB, HCT, PLT in the last 72 hours. No results for input(s): NA, K, CL, CO2, BUN, CREATININE, CALCIUM in the last 72 hours. Invalid input(s): GLU    Ct Abdomen Pelvis W Iv Contrast Additional Contrast? None    Result Date: 2/3/2021  EXAMINATION: CT OF THE ABDOMEN AND PELVIS WITH CONTRAST 2/3/2021 4:06 pm TECHNIQUE: CT of the abdomen and pelvis was performed with the administration of intravenous contrast. Multiplanar reformatted images are provided for review.  Dose modulation, iterative reconstruction, and/or weight based adjustment of the bilaterally, no wheeze, rales or rhonchi  Abd: bowel sounds present, nontender, nondistended, no masses  Extrem:  No clubbing, cyanosis, or edema    Disposition: home     Patient Condition at Discharge: stable     Patient Instructions:      Medication List      START taking these medications    digoxin 125 MCG tablet  Commonly known as: LANOXIN  Take 1 tablet by mouth daily     lactobacillus Caps capsule  Take 1 capsule by mouth daily     metoprolol succinate 50 MG extended release tablet  Commonly known as: TOPROL XL  Take 3 tablets by mouth 2 times daily     predniSONE 20 MG tablet  Commonly known as: DELTASONE  Take 2 tablets by mouth daily for 10 days     rivaroxaban 15 MG Tabs tablet  Commonly known as: XARELTO  Take 1 tablet by mouth daily        CONTINUE taking these medications    dicyclomine 10 MG capsule  Commonly known as: BENTYL     losartan-hydroCHLOROthiazide 100-25 MG per tablet  Commonly known as: HYZAAR     pantoprazole 40 MG tablet  Commonly known as: PROTONIX        STOP taking these medications    Avonex 30 MCG injection  Generic drug: interferon beta-1a     metroNIDAZOLE 500 MG tablet  Commonly known as: FLAGYL     Norvasc 5 MG tablet  Generic drug: amLODIPine     Tenormin 50 MG tablet  Generic drug: atenolol        ASK your doctor about these medications    ciprofloxacin 500 MG tablet  Commonly known as: CIPRO  Ask about: Should I take this medication? Where to Get Your Medications      These medications were sent to [facility], [address] [place]    Phone: [phone]   · digoxin 125 MCG tablet  · lactobacillus Caps capsule  · metoprolol succinate 50 MG extended release tablet  · predniSONE 20 MG tablet  · rivaroxaban 15 MG Tabs tablet       Activity: activity as tolerated  Diet: cardiac diet    Pt has been advised to: Follow-up with Jorge Alberto Bernabe MD in 1 week.   Follow-up

## 2021-02-15 NOTE — CARE COORDINATION
[facility] Trans[facility] Follow Up Call    2/15/2021    Patient: Vargas Gonzalez  Patient : 1955   MRN: [id number]  Reason for Admission:   Discharge Date: 21 RARS: Readmission Risk Score: 15         Spoke with: Patient, Senait Gore Transitions Subsequent and Final Call    Subsequent and Final Calls  Do you have any ongoing symptoms?: Yes  Patient-reported symptoms: Shortness of Breath  -patient reports occasional shortness of breath with exertion   -patient reports occasional palpitations; has f/u appointment with Cardiologist next week  Do you have any questions related to your medications?: No  Do you currently have any active services?: No  Do you have any needs or concerns that I can assist you with?: No  Identified Barriers: None  Care Transitions Interventions  No Identified Needs    Patient is pleasant in conversation.   -denies any C/O abdominal pain  -reports swelling in BLE has resolved   -reports good appetite with normal bladder/bowel elimination   -independent with ambulation; denies recent fall   -reports taking prescribed medications as ordered      Emotional support provided; discussed will continue to follow.     Follow Up  Future Appointments   Date Time Provider Millie Man   2021  1:40 PM Lilia Archibald MD [address], RN

## 2021-02-22 ENCOUNTER — CARE COORDINATION (OUTPATIENT)
Dept: CASE MANAGEMENT | Age: 66
End: 2021-02-22

## 2021-02-22 NOTE — CARE COORDINATION
[facility] Trans[facility] Follow Up Call    2021    Patient: Desire Teran  Patient : 1955   MRN: <[id number]>  Reason for Admission: Afib  Discharge Date: 21 RARS: Readmission Risk Score: 15       Attempted to contact patient for BPCI-A call. Contact information left to  requesting call back at the earliest convenience.     Follow Up  Future Appointments   Date Time Provider Millie Man   2021  1:40 PM MD Attila Palmer RN

## 2021-02-24 ENCOUNTER — OFFICE VISIT (OUTPATIENT)
Dept: NEUROLOGY | Age: 66
End: 2021-02-24
Payer: MEDICARE

## 2021-02-24 VITALS
RESPIRATION RATE: 18 BRPM | WEIGHT: 216 LBS | TEMPERATURE: 97.3 F | DIASTOLIC BLOOD PRESSURE: 90 MMHG | OXYGEN SATURATION: 97 % | BODY MASS INDEX: 34.72 KG/M2 | HEART RATE: 112 BPM | HEIGHT: 66 IN | SYSTOLIC BLOOD PRESSURE: 151 MMHG

## 2021-02-24 DIAGNOSIS — G35 MULTIPLE SCLEROSIS (HCC): Primary | ICD-10-CM

## 2021-02-24 PROCEDURE — G8427 DOCREV CUR MEDS BY ELIG CLIN: HCPCS | Performed by: PSYCHIATRY & NEUROLOGY

## 2021-02-24 PROCEDURE — G8484 FLU IMMUNIZE NO ADMIN: HCPCS | Performed by: PSYCHIATRY & NEUROLOGY

## 2021-02-24 PROCEDURE — G8417 CALC BMI ABV UP PARAM F/U: HCPCS | Performed by: PSYCHIATRY & NEUROLOGY

## 2021-02-24 PROCEDURE — 99205 OFFICE O/P NEW HI 60 MIN: CPT | Performed by: PSYCHIATRY & NEUROLOGY

## 2021-02-24 PROCEDURE — 1090F PRES/ABSN URINE INCON ASSESS: CPT | Performed by: PSYCHIATRY & NEUROLOGY

## 2021-02-24 NOTE — PROGRESS NOTES
This 70-year-old right-handed woman was referred for additional evaluation and management of longstanding multiple sclerosis. She was an excellent historian. Her medications included Xarelto, metoprolol, digoxin, pantoprazole, dicyclomine, losartan, hydrochlorothiazide and Avonex. Her past medical history remarkable for hypertension and recent onset of atrial fibrillation. She is disorder is well controlled with her antihypertensive regimen and apixaban. She also suffered from irritable bowel syndrome, gastroesophageal reflux disease and osteoarthritis. She denied diabetes mellitus, heart disease, lung disorders, strokes, seizures, current kidney disease, cancers, other gastrointestinal issues, other connective tissue disorders, skin abnormalities or depression. Her surgeries include a right ankle fracture repair years ago, cholecystectomy and bilateral knee replacements. She denied any allergies or trauma. She was a native of the [place] [place] [facility] [place]. She was  with 1 healthy son and 2 healthy grandchildren. 1 brother  in a house fire. Her mother  of a stroke at 80; her father succumbed to renal failure at 80. One cousin suffered from multiple sclerosis. There were no other neurological disorders in the family. She was of [facility] and ancestry. She was a retired  at [employer].    She did not smoke, drink or use illicit drugs. She ate well. Her sleep was erratic, usually never more than 6 hours daily. Review of systems was otherwise unremarkable, except as noted below. 20 years ago she developed numbness and then weakness of her entire left leg. Sooner afterwards, she reported numbness of her entire face. There were also occasional choking episodes. She was evaluated locally and then referred to [facility]. The diagnosis of multiple sclerosis was made at that time.   She was initially placed on Betaseron and then switched to Avonex. On occasion, she would develop left-sided weakness that resolved well. She was never on any other disease modifying therapy. During her recent hospitalization for cardiac issues, she noted tingling in both feet and legs. This was treated as a presumptive multiple sclerosis exacerbation and she received a short course of steroids. Her feet tingling continued. She was, therefore, referred here. She also noted markedly flat feet as wearing shoe inserts and left ankle brace. Arising from sleep produced pain in both soles. She denied any back pains or radiating discomforts. She denied any other neurological issues, as cognitive decline, visual loss, spinning sensations, speech difficulties, swallowing or chewing abnormalities, clumsiness, headaches or other pains. Physical examination displayed stable vital signs, except for a blood pressure 151/90. She was elderly woman in no acute distress, who was alert, cooperative and oriented. She was very pleasant. Her skin was unremarkable, with no abnormalities. Head examination was unrevealing. Her neck was supple without thyromegaly; there were +1 carotid upstrokes without bruits; there was full range of motion of her neck. Her spine displayed only minimal gibbus deformity, without tenderness. Her lungs were clear to auscultation. Cardiac testing proved unremarkable; I did not appreciate any arrhythmia. Her abdomen was unrevealing. There were +1 peripheral pulses without bruits. Her limbs displayed marked bilateral pes planus deformities, more so in the left foot. I also found Tinel's signs at both wrists and tarsal tunnels. There is marked tenderness in the soles of both feet. Straight leg raising was unremarkable. Neurological examination produced an intact mental status. On cranial nerve testing, I found red desaturation but no [place] pupil. There were no other abnormalities.   I found normal tone and bulk with 5/5 strength throughout. There were no abnormal movements. Reflexes were +2 in the arms, +1 at the knees and absent at the ankle. There were no pathological reflexes. Light touch and pinprick were intact throughout. Vibration was only slightly decreased at both ankles. There were no coordination abnormalities. Her gait was slow and slightly arthritic. However, she otherwise walked well. Romberg's was unremarkable. Laboratory data included a recent unremarkable CMP except for a GFR of 41. CBC with differential was unrevealing. Recent echocardiogram displayed only minimal tricuspid and mitral regurgitation and minimal left ventricular hypertrophy. This individual presents with longstanding history of multiple sclerosis. 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 I suspect her recent tingling in her calves and feet were related to her marked plantar fasciitis and tarsal tunnel syndrome. Hopefully, she can be weaned off Avonex. She also suffers from minimal carpal tunnel syndrome, as well as tarsal tunnel syndrome. She is stable medically despite her comorbidities. She will return in 4 months. Repeat MRIs of her head and neck were scheduled. After these studies, we will gradually wean Avonex. We will decrease Avonex by 1 shot per month. She will report back after the above studies were completed. She will call anytime if problems arise. I spent 80 minutes with the patient with over 50 % spent in counseling and disease management. All patient issues were addressed and all questions were answered.

## 2021-03-02 ENCOUNTER — CARE COORDINATION (OUTPATIENT)
Dept: CASE MANAGEMENT | Age: 66
End: 2021-03-02

## 2021-03-02 ENCOUNTER — HOSPITAL ENCOUNTER (OUTPATIENT)
Age: 66
Discharge: HOME | End: 2021-03-04
Payer: MEDICARE

## 2021-03-02 ENCOUNTER — HOSPITAL ENCOUNTER (OUTPATIENT)
Age: 66
Discharge: HOME | End: 2021-03-02
Payer: MEDICARE

## 2021-03-02 DIAGNOSIS — Z01.810 PREOP CARDIOVASCULAR EXAM: ICD-10-CM

## 2021-03-02 LAB
ANION GAP SERPL CALCULATED.3IONS-SCNC: 10 MMOL/L (ref 7–16)
BASOPHILS ABSOLUTE: 0.04 E9/L (ref 0–0.2)
BASOPHILS RELATIVE PERCENT: 0.5 % (ref 0–2)
BUN BLDV-MCNC: 22 MG/DL (ref 8–23)
CALCIUM SERPL-MCNC: 8.8 MG/DL (ref 8.6–10.2)
CHLORIDE BLD-SCNC: 104 MMOL/L (ref 98–107)
CO2: 30 MMOL/L (ref 22–29)
CREAT SERPL-MCNC: 1.3 MG/DL (ref 0.5–1)
EOSINOPHILS ABSOLUTE: 0.37 E9/L (ref 0.05–0.5)
EOSINOPHILS RELATIVE PERCENT: 4.4 % (ref 0–6)
GFR AFRICAN AMERICAN: 49
GFR NON-AFRICAN AMERICAN: 41 ML/MIN/1.73
GLUCOSE BLD-MCNC: 133 MG/DL (ref 74–99)
HCT VFR BLD CALC: 36.3 % (ref 34–48)
HEMOGLOBIN: 11.8 G/DL (ref 11.5–15.5)
IMMATURE GRANULOCYTES #: 0.07 E9/L
IMMATURE GRANULOCYTES %: 0.8 % (ref 0–5)
LYMPHOCYTES ABSOLUTE: 1.88 E9/L (ref 1.5–4)
LYMPHOCYTES RELATIVE PERCENT: 22.1 % (ref 20–42)
MCH RBC QN AUTO: 29.2 PG (ref 26–35)
MCHC RBC AUTO-ENTMCNC: 32.5 % (ref 32–34.5)
MCV RBC AUTO: 89.9 FL (ref 80–99.9)
MONOCYTES ABSOLUTE: 0.83 E9/L (ref 0.1–0.95)
MONOCYTES RELATIVE PERCENT: 9.8 % (ref 2–12)
NEUTROPHILS ABSOLUTE: 5.3 E9/L (ref 1.8–7.3)
NEUTROPHILS RELATIVE PERCENT: 62.4 % (ref 43–80)
PDW BLD-RTO: 15.1 FL (ref 11.5–15)
PLATELET # BLD: 332 E9/L (ref 130–450)
PMV BLD AUTO: 10.2 FL (ref 7–12)
POTASSIUM SERPL-SCNC: 3.1 MMOL/L (ref 3.5–5)
RBC # BLD: 4.04 E12/L (ref 3.5–5.5)
SODIUM BLD-SCNC: 144 MMOL/L (ref 132–146)
WBC # BLD: 8.5 E9/L (ref 4.5–11.5)

## 2021-03-02 PROCEDURE — 36415 COLL VENOUS BLD VENIPUNCTURE: CPT

## 2021-03-02 PROCEDURE — 85025 COMPLETE CBC W/AUTO DIFF WBC: CPT

## 2021-03-02 PROCEDURE — U0003 INFECTIOUS AGENT DETECTION BY NUCLEIC ACID (DNA OR RNA); SEVERE ACUTE RESPIRATORY SYNDROME CORONAVIRUS 2 (SARS-COV-2) (CORONAVIRUS DISEASE [COVID-19]), AMPLIFIED PROBE TECHNIQUE, MAKING USE OF HIGH THROUGHPUT TECHNOLOGIES AS DESCRIBED BY CMS-2020-01-R: HCPCS

## 2021-03-02 PROCEDURE — 80048 BASIC METABOLIC PNL TOTAL CA: CPT

## 2021-03-02 NOTE — CARE COORDINATION
[facility] Transitions Follow Up Call    3/2/2021    Patient: Carlos Huang  Patient : 1955   MRN: [id number]  Reason for Admission:   Discharge Date: 21 RARS: Readmission Risk Score: 15       Attempted to reach patient by phone regarding follow up; BPCI-A. HIPAA compliant message left on patient's voicemail; CTN contact information provided.        Priti Martinez RN

## 2021-03-03 LAB
SARS-COV-2: NOT DETECTED
SOURCE: NORMAL

## 2021-03-08 ENCOUNTER — ANESTHESIA EVENT (OUTPATIENT)
Dept: CARDIAC CATH/INVASIVE PROCEDURES | Age: 66
End: 2021-03-08

## 2021-03-08 ENCOUNTER — HOSPITAL ENCOUNTER (OUTPATIENT)
Age: 66
Discharge: HOME | End: 2021-03-08
Payer: MEDICARE

## 2021-03-08 ENCOUNTER — HOSPITAL ENCOUNTER (OUTPATIENT)
Dept: CARDIAC CATH/INVASIVE PROCEDURES | Age: 66
Discharge: HOME | End: 2021-03-08
Payer: MEDICARE

## 2021-03-08 ENCOUNTER — ANESTHESIA (OUTPATIENT)
Dept: CARDIAC CATH/INVASIVE PROCEDURES | Age: 66
End: 2021-03-08

## 2021-03-08 VITALS
DIASTOLIC BLOOD PRESSURE: 75 MMHG | WEIGHT: 216 LBS | TEMPERATURE: 98 F | OXYGEN SATURATION: 99 % | BODY MASS INDEX: 34.72 KG/M2 | RESPIRATION RATE: 16 BRPM | HEART RATE: 61 BPM | HEIGHT: 66 IN | SYSTOLIC BLOOD PRESSURE: 116 MMHG

## 2021-03-08 VITALS
OXYGEN SATURATION: 99 % | RESPIRATION RATE: 19 BRPM | SYSTOLIC BLOOD PRESSURE: 116 MMHG | DIASTOLIC BLOOD PRESSURE: 75 MMHG

## 2021-03-08 DIAGNOSIS — Z01.810 PREOP CARDIOVASCULAR EXAM: Primary | ICD-10-CM

## 2021-03-08 DIAGNOSIS — G35 MULTIPLE SCLEROSIS (HCC): ICD-10-CM

## 2021-03-08 LAB
ANION GAP SERPL CALCULATED.3IONS-SCNC: 12 MMOL/L (ref 7–16)
BUN BLDV-MCNC: 20 MG/DL (ref 8–23)
CALCIUM SERPL-MCNC: 9 MG/DL (ref 8.6–10.2)
CHLORIDE BLD-SCNC: 102 MMOL/L (ref 98–107)
CO2: 28 MMOL/L (ref 22–29)
CREAT SERPL-MCNC: 1.2 MG/DL (ref 0.5–1)
GFR AFRICAN AMERICAN: 54
GFR NON-AFRICAN AMERICAN: 45 ML/MIN/1.73
GLUCOSE BLD-MCNC: 111 MG/DL (ref 74–99)
POTASSIUM SERPL-SCNC: 3.3 MMOL/L (ref 3.5–5)
SODIUM BLD-SCNC: 142 MMOL/L (ref 132–146)

## 2021-03-08 PROCEDURE — 80048 BASIC METABOLIC PNL TOTAL CA: CPT

## 2021-03-08 PROCEDURE — 93005 ELECTROCARDIOGRAM TRACING: CPT | Performed by: INTERNAL MEDICINE

## 2021-03-08 PROCEDURE — 2709999900 HC NON-CHARGEABLE SUPPLY

## 2021-03-08 PROCEDURE — 2580000003 HC RX 258: Performed by: NURSE ANESTHETIST, CERTIFIED REGISTERED

## 2021-03-08 PROCEDURE — 3700000000 HC ANESTHESIA ATTENDED CARE

## 2021-03-08 PROCEDURE — 36415 COLL VENOUS BLD VENIPUNCTURE: CPT

## 2021-03-08 PROCEDURE — 6360000002 HC RX W HCPCS: Performed by: NURSE ANESTHETIST, CERTIFIED REGISTERED

## 2021-03-08 PROCEDURE — 92960 CARDIOVERSION ELECTRIC EXT: CPT | Performed by: INTERNAL MEDICINE

## 2021-03-08 RX ORDER — SODIUM CHLORIDE 9 MG/ML
INJECTION, SOLUTION INTRAVENOUS CONTINUOUS PRN
Status: DISCONTINUED | OUTPATIENT
Start: 2021-03-08 | End: 2021-03-08 | Stop reason: SDUPTHER

## 2021-03-08 RX ORDER — PROPOFOL 10 MG/ML
INJECTION, EMULSION INTRAVENOUS PRN
Status: DISCONTINUED | OUTPATIENT
Start: 2021-03-08 | End: 2021-03-08 | Stop reason: SDUPTHER

## 2021-03-08 RX ORDER — SODIUM CHLORIDE 0.9 % (FLUSH) 0.9 %
10 SYRINGE (ML) INJECTION EVERY 12 HOURS SCHEDULED
Status: DISCONTINUED | OUTPATIENT
Start: 2021-03-08 | End: 2021-03-09 | Stop reason: HOSPADM

## 2021-03-08 RX ORDER — SODIUM CHLORIDE 0.9 % (FLUSH) 0.9 %
10 SYRINGE (ML) INJECTION PRN
Status: DISCONTINUED | OUTPATIENT
Start: 2021-03-08 | End: 2021-03-09 | Stop reason: HOSPADM

## 2021-03-08 RX ADMIN — PROPOFOL 80 MG: 10 INJECTION, EMULSION INTRAVENOUS at 12:32

## 2021-03-08 RX ADMIN — SODIUM CHLORIDE: 9 INJECTION, SOLUTION INTRAVENOUS at 12:25

## 2021-03-08 ASSESSMENT — ENCOUNTER SYMPTOMS: SHORTNESS OF BREATH: 1

## 2021-03-08 NOTE — ANESTHESIA PRE PROCEDURE
Department of Anesthesiology  Preprocedure Note       Name:  Josef Batres   Age:  74 y.o.  :  1955                                          MRN:  [id number]         Date:  3/8/2021      Surgeon: * Surgery not found *    Procedure:  DCCV    Medications prior to admission:   Prior to Admission medications    Medication Sig Start Date End Date Taking?  Authorizing Provider   rivaroxaban (XARELTO) 15 MG TABS tablet Take 1 tablet by mouth daily 21  Yes Eren Dsouza MD   lactobacillus (CULTURELLE) CAPS capsule Take 1 capsule by mouth daily 21  Yes Eren Dsouza MD   metoprolol succinate (TOPROL XL) 50 MG extended release tablet Take 3 tablets by mouth 2 times daily 21  Yes Eren Dsouza MD   digoxin (LANOXIN) 125 MCG tablet Take 1 tablet by mouth daily 21  Yes Eren Dsouza MD   pantoprazole (PROTONIX) 40 MG tablet Take 40 mg by mouth nightly   Yes Historical Provider, MD   dicyclomine (BENTYL) 10 MG capsule Take 10 mg by mouth 2 times daily   Yes Historical Provider, MD   losartan-hydrochlorothiazide (HYZAAR) 100-25 MG per tablet Take 1 tablet by mouth nightly  18  Yes Historical Provider, MD   amLODIPine (NORVASC) 5 MG tablet Inhale into the lungs 16  Historical Provider, MD       Current medications:    Current Outpatient Medications   Medication Sig Dispense Refill    rivaroxaban (XARELTO) 15 MG TABS tablet Take 1 tablet by mouth daily 42 tablet 0    lactobacillus (CULTURELLE) CAPS capsule Take 1 capsule by mouth daily 30 capsule 0    metoprolol succinate (TOPROL XL) 50 MG extended release tablet Take 3 tablets by mouth 2 times daily 30 tablet 3    digoxin (LANOXIN) 125 MCG tablet Take 1 tablet by mouth daily 30 tablet 3    pantoprazole (PROTONIX) 40 MG tablet Take 40 mg by mouth nightly      dicyclomine (BENTYL) 10 MG capsule Take 10 mg by mouth 2 times daily      losartan-hydrochlorothiazide (HYZAAR) 100-25 MG per tablet Take 1 tablet by mouth nightly        No current facility-administered medications for this encounter. Allergies:  No Known Allergies    Problem List:    Patient Active Problem List   Diagnosis Code    Abnormal EKG R94.31    Chest pain R07.9    Dyspnea R06.00    Nausea R11.0    GERD (gastroesophageal reflux disease) K21.9    Obesity E66.9    Atrial fibrillation with RVR (HCC) I48.91    Multiple sclerosis ([facility] G35    [facility] N17.9       Past Medical History:        Diagnosis Date    [facility]fib [facility] 2021    HTN (hypertension)     MS [facility]multiple sclerosis) ([email or web address]        Past Surgical History:        Procedure Laterality Date     SECTION      CHOLECYSTECTOMY      JOINT REPLACEMENT Bilateral [id number]       Social History:    Social History     Tobacco Use    Smoking status: Never Smoker    Smokeless tobacco: Never Used   Substance Use Topics    Alcohol use: Not Currently                                Counseling given: Not Answered      Vital Signs (Current):   Vitals:    21 1003   BP: (!) 151/92   Pulse: 113   Resp: 20   Temp: 98 °F (36.7 °C)   TempSrc: Temporal   SpO2: 98%   Weight: 216 lb (98 kg)   Height: 5' 6\" (1.676 m)                                              BP Readings from Last 3 Encounters:   21 (!) 151/92   21 (!) 151/90   21 120/78       NPO Status:   > 8hrs                                                                               BMI:   Wt Readings from Last 3 Encounters:   21 216 lb (98 kg)   21 216 lb (98 kg)   21 234 lb (106.1 kg)     Body mass index is 34.86 kg/m².     CBC:   Lab Results   Component Value Date    WBC 8.5 2021    RBC 4.04 2021    HGB 11.8 2021    HCT 36.3 2021    MCV 89.9 2021    RDW 15.1 2021     2021       CMP:   Lab Results   Component Value Date     2021    K 3.1 2021    K 3.9 04/15/2020     2021    CO2 30 2021    BUN 22 2021

## 2021-03-08 NOTE — PROCEDURES
Cardioversion Report      Indication:  Atrial fibrillation    Procedure:  Direct current electrical cardioversion under deep sedation    Primary Care Physician: Radha Hoff MD    Clinical History:  74y.o. year-old female with atrial fibrillation since the beginning of February. Started on [address] Presenting for cardioversion. Current Outpatient Medications:  Current Outpatient Medications   Medication Sig Dispense Refill    rivaroxaban (XARELTO) 15 MG TABS tablet Take 1 tablet by mouth daily 42 tablet 0    lactobacillus (CULTURELLE) CAPS capsule Take 1 capsule by mouth daily 30 capsule 0    metoprolol succinate (TOPROL XL) 50 MG extended release tablet Take 3 tablets by mouth 2 times daily 30 tablet 3    digoxin (LANOXIN) 125 MCG tablet Take 1 tablet by mouth daily 30 tablet 3    pantoprazole (PROTONIX) 40 MG tablet Take 40 mg by mouth nightly      dicyclomine (BENTYL) 10 MG capsule Take 10 mg by mouth 2 times daily      losartan-hydrochlorothiazide (HYZAAR) 100-25 MG per tablet Take 1 tablet by mouth nightly        No current facility-administered medications for this encounter. Facility-Administered Medications Ordered in Other Encounters   Medication Dose Route Frequency Provider Last Rate Last Admin    0.9 % sodium chloride infusion    Continuous PRN May JUDY Bailey - CRNA   New Bag at 03/08/21 1225       Consent:  Patient consented to the above procedure after being advised of the risks, benefits, and alternatives. Risks were outlined including but not limited to death, stroke, myocardial infarction, skin burn or irritation, respiratory suppression with need for intubation and mechanical ventilation, and electrical arrhythmias requiring pacing and/or defibrillation. Procedure Description:  Patient was brought to cardiac procedure room. Standard monitoring was instituted including EKG monitoring, pulse oximetry, and oxygen by nasal cannula.   Defibrillation patches were placed

## 2021-03-09 ENCOUNTER — CARE COORDINATION (OUTPATIENT)
Dept: CASE MANAGEMENT | Age: 66
End: 2021-03-09

## 2021-03-09 NOTE — CARE COORDINATION
[facility] Transitions Follow Up Call    3/9/2021    Patient: Carlos Huang  Patient : 1955   MRN: [id number]  Reason for Admission:   Discharge Date: 21 RARS: Readmission Risk Score: 15       Attempted to reach patient by phone regarding follow up; BPCI-A. HIPAA compliant message left on patient's voicemail; CTN contact information provided.      Priti Martinez RN

## 2021-03-10 LAB
EKG ATRIAL RATE: 394 BPM
EKG ATRIAL RATE: 67 BPM
EKG P AXIS: 41 DEGREES
EKG P-R INTERVAL: 166 MS
EKG Q-T INTERVAL: 316 MS
EKG Q-T INTERVAL: 382 MS
EKG QRS DURATION: 68 MS
EKG QRS DURATION: 80 MS
EKG QTC CALCULATION (BAZETT): 403 MS
EKG QTC CALCULATION (BAZETT): 413 MS
EKG R AXIS: -2 DEGREES
EKG R AXIS: 7 DEGREES
EKG T AXIS: -57 DEGREES
EKG T AXIS: 34 DEGREES
EKG VENTRICULAR RATE: 103 BPM
EKG VENTRICULAR RATE: 67 BPM

## 2021-03-16 ENCOUNTER — CARE COORDINATION (OUTPATIENT)
Dept: CASE MANAGEMENT | Age: 66
End: 2021-03-16

## 2021-03-16 NOTE — CARE COORDINATION
[facility] Transitions Follow Up Call    3/16/2021    Patient: Teddy Iyer  Patient : 1955   MRN: [id number]  Reason for Admission:   Discharge Date: 21 RARS: Readmission Risk Score: 15         Spoke with: Patient, Kellee Palumbo Transitions Subsequent and Final Call    Subsequent and Final Calls  Do you have any ongoing symptoms?: Yes  Patient-reported symptoms: Weakness, Fatigue  -patient reports ongoing symptoms related to MS  Do you have any needs or concerns that I can assist you with?: No  Identified Barriers: None  Care Transitions Interventions  No Identified Needs    Patient is pleasant in conversation and reports she is feeling much better S/P cardioversion.   -denies any C/O chest pain, palpitations, shortness of breath, lightheaded, or dizziness   -denies any swelling   -reports appetite has improved   -normal bladder/bowel elimination   -reports increased endurance   -independent with ambulation; denies recent fall   -scheduled 3/29/2021 for second dose of COVID vaccine     Emotional support provided; will continue to follow.      Follow Up  Future Appointments   Date Time Provider Millie Man   2021  1:00 PM Rosalie Todd MD [facility], RN

## 2021-03-30 ENCOUNTER — CARE COORDINATION (OUTPATIENT)
Dept: CASE MANAGEMENT | Age: 66
End: 2021-03-30

## 2021-03-30 NOTE — CARE COORDINATION
[facility] Transitions Follow Up Call    3/30/2021    Patient: Eden Thomas  Patient : 1955   MRN: [id number]  Reason for Admission:   Discharge Date: 21 RARS: Readmission Risk Score: 15         Spoke with: PatientSebas Transitions Subsequent and Final Call    Subsequent and Final Calls  Do you have any ongoing symptoms?: Yes  Patient-reported symptoms: Palpitations, Shortness of Breath, Fatigue, Weakness  -Patient reports she is back in A-fib; offers C/O palpitations, shortness of breath with exertion, and occasional lightheaded. Have your medications changed?: Yes  Patient Reports: active medication adjustments per Cardiologist  Do you have any questions related to your medications?: No  Do you have any needs or concerns that I can assist you with?: No  Identified Barriers: None  Care Transitions Interventions  No Identified Needs    Patient is pleasant in conversation; reports she is discouraged that she is back in A-fib following recent Cardioversion. -patient reports she was referred to EP at [facility]; patient awaiting call  -denies any C/O chest pain or swelling   -reports appetite continues to improve   -normal bladder/bowel elimination   -ambulates independently; denies recent fall   -reports yesterday had second dose of COVID-19 vaccine; offers no complaints    Emotional support provided; discussed will continue to follow. PLAN NEXT CALL:  Verify patient has been contacted and scheduled with EP.        Follow Up  Future Appointments   Date Time Provider Millie Man   2021  1:00 PM Yohana Guillaume MD [facility], VIJAY

## 2021-04-06 ENCOUNTER — CARE COORDINATION (OUTPATIENT)
Dept: CASE MANAGEMENT | Age: 66
End: 2021-04-06

## 2021-04-06 NOTE — CARE COORDINATION
[facility] Transitions Follow Up Call    2021    Patient: Syd Brenner  Patient : 1955   MRN: <[id number]>  Reason for Admission:   Discharge Date: 21 RARS: Readmission Risk Score: 15    Attempted to contact patient for BPCI-A follow up. Unable to reach patient. Left message with contact information and request for call back.       Follow Up  Future Appointments   Date Time Provider Millie Man   2021  1:00 PM MD Felix Gomez RN

## 2021-04-13 ENCOUNTER — CARE COORDINATION (OUTPATIENT)
Dept: CASE MANAGEMENT | Age: 66
End: 2021-04-13

## 2021-04-20 ENCOUNTER — CARE COORDINATION (OUTPATIENT)
Dept: CASE MANAGEMENT | Age: 66
End: 2021-04-20

## 2021-04-20 NOTE — CARE COORDINATION
[facility] Transitions Follow Up Call    2021    Patient: Dung Koch  Patient : 1955   MRN: [id number]  Reason for Admission:   Discharge Date: 21 RARS: Readmission Risk Score: 15       Attempted to reach patient by phone regarding follow up; BPCI-A. HIPAA compliant message left on patient's voicemail; CTN contact information provided.      Seymour Snyder RN

## 2021-04-22 ENCOUNTER — CARE COORDINATION (OUTPATIENT)
Dept: CASE MANAGEMENT | Age: 66
End: 2021-04-22

## 2021-05-04 ENCOUNTER — CARE COORDINATION (OUTPATIENT)
Dept: CASE MANAGEMENT | Age: 66
End: 2021-05-04

## 2021-05-04 NOTE — CARE COORDINATION
[facility] Transitions Follow Up Call    2021    Patient: Daniel Yeh  Patient : 1955   MRN: [id number]  Reason for Admission:   Discharge Date: 21 RARS: Readmission Risk Score: 15    Attempted to reach patient by phone regarding follow up; BPCI-A. HIPAA compliant message left on patient's voicemail; CTN contact information provided. Please follow up with your PCP for any immediate needs or concerns. Final call; no further outreach.         Flavio Avalos RN

## 2021-06-30 ENCOUNTER — OFFICE VISIT (OUTPATIENT)
Dept: NEUROLOGY | Age: 66
End: 2021-06-30
Payer: MEDICARE

## 2021-06-30 VITALS
OXYGEN SATURATION: 98 % | BODY MASS INDEX: 32.78 KG/M2 | HEIGHT: 66 IN | DIASTOLIC BLOOD PRESSURE: 81 MMHG | SYSTOLIC BLOOD PRESSURE: 130 MMHG | HEART RATE: 102 BPM | TEMPERATURE: 97.5 F | WEIGHT: 204 LBS | RESPIRATION RATE: 20 BRPM

## 2021-06-30 DIAGNOSIS — G35 MULTIPLE SCLEROSIS (HCC): Primary | ICD-10-CM

## 2021-06-30 PROCEDURE — G8427 DOCREV CUR MEDS BY ELIG CLIN: HCPCS | Performed by: PSYCHIATRY & NEUROLOGY

## 2021-06-30 PROCEDURE — 1123F ACP DISCUSS/DSCN MKR DOCD: CPT | Performed by: PSYCHIATRY & NEUROLOGY

## 2021-06-30 PROCEDURE — 4040F PNEUMOC VAC/ADMIN/RCVD: CPT | Performed by: PSYCHIATRY & NEUROLOGY

## 2021-06-30 PROCEDURE — 1090F PRES/ABSN URINE INCON ASSESS: CPT | Performed by: PSYCHIATRY & NEUROLOGY

## 2021-06-30 PROCEDURE — 3017F COLORECTAL CA SCREEN DOC REV: CPT | Performed by: PSYCHIATRY & NEUROLOGY

## 2021-06-30 PROCEDURE — G8417 CALC BMI ABV UP PARAM F/U: HCPCS | Performed by: PSYCHIATRY & NEUROLOGY

## 2021-06-30 PROCEDURE — 99215 OFFICE O/P EST HI 40 MIN: CPT | Performed by: PSYCHIATRY & NEUROLOGY

## 2021-06-30 PROCEDURE — 1036F TOBACCO NON-USER: CPT | Performed by: PSYCHIATRY & NEUROLOGY

## 2021-06-30 PROCEDURE — G8400 PT W/DXA NO RESULTS DOC: HCPCS | Performed by: PSYCHIATRY & NEUROLOGY

## 2021-06-30 NOTE — PROGRESS NOTES
This 42-year-old right-handed woman was referred for evaluation and management of longstanding multiple sclerosis. She remained an excellent historian. Her medications included rivaroxaban, metoprolol, digoxin, pantoprazole, dicyclomine, losartan, hydrochlorothiazide and Avonex. Her past medical history remarkable for hypertension and recurrent atrial fibrillation. She recently underwent ablation for atrial fibrillation; this arrhythmia returned in several weeks. She continued well on rivaroxaban. She felt weaker when in atrial fibrillation. She also suffered from irritable bowel syndrome, gastroesophageal reflux disease and osteoarthritis. She denied diabetes mellitus, heart disease, lung disorders, strokes, seizures, current kidney disease, cancers, other gastrointestinal issues, other connective tissue disorders, skin abnormalities or depression. Over 20 years ago she developed numbness and then weakness of her entire left leg. Soon afterwards, she reported numbness of her entire face. There was also occasional choking. She was evaluated locally and then referred to [facility]. The diagnosis of multiple sclerosis was confirmed. She was initially prescribed Betaseron then switching to Avonex. She noted intermittent left-sided weakness in the past that resolved well. She was never on other disease modifying therapy. During her recent hospitalization for cardiac issues, she noted tingling in both feet and legs. The sensations were felt to constitute a multiple sclerosis exacerbation; she received a short course of steroids. This tingling continued. These issues were felt secondary to markedly flat feet. Shoe orthotics improved her discomforts. She again denied back pains or radiating discomforts.     She reported no other neurological issues, as cognitive decline, visual loss, spinning sensations, speech difficulties, swallowing or chewing abnormalities, clumsiness, headaches or other pains.    She denied other medical issues. She had received the COVID-19 vaccinations. Review of systems was otherwise unremarkable. Physical examination displayed stable vital signs, with a blood pressure 130/81. She was elderly woman in no acute distress, who was alert, cooperative and oriented. She remained very pleasant. Her skin was unremarkable. Her lungs were clear to auscultation. Cardiac testing found an irregularly irregular rhythm. Her limbs displayed marked bilateral pes planus deformities, more so in the left foot. I again found Tinel's signs at both wrists and tarsal tunnels. There remained marked tenderness in the soles of both feet. Neurological examination produced an intact mental status. I found red desaturation but no [place] pupil. There were no other abnormalities. I found normal tone and bulk with 5/5 strength throughout. Reflexes were +2 in the arms, +1 at the knees and absent at the ankle. There were no pathological reflexes. Light touch and pinprick were intact. Vibration was only slightly decreased at both ankles. There were no coordination abnormalities. Her gait was slow and slightly arthritic. However, she otherwise walked well. Laboratory data included recent MRIs of her head and cervical spine which revealed only chronic demyelinating plaques. Recent CMP was unrevealing except for GFR 45. CBC with differential was unrevealing. Recent echocardiogram displayed only minimal tricuspid and mitral regurgitation and minimal left ventricular hypertrophy. This individual presents with longstanding multiple sclerosis. 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 Her MRIs revealed no active disease. She may discontinue Avonex. Her tingling in her calves and feet are related to her marked plantar fasciitis and tarsal tunnel syndrome. She has improved with shoe inserts. .    She also suffers from minimal carpal tunnel syndrome, as well as tarsal tunnel syndrome. She is stable medically despite her comorbidities. Unfortunately, she remains in atrial fibrillation despite recent ablation. She is symptomatic losing her atrial kick. She will return in 6 months. Avonex was discontinued. She will call anytime if problems arise. I spent 40 minutes with the patient with over 50 % spent in counseling and disease management. All patient issues were addressed and all questions were answered.

## 2021-09-07 ENCOUNTER — HOSPITAL ENCOUNTER (OUTPATIENT)
Dept: DIABETES SERVICES | Age: 66
Setting detail: THERAPIES SERIES
Discharge: HOME | End: 2021-09-07
Payer: MEDICARE

## 2021-09-07 PROCEDURE — G0109 DIAB MANAGE TRN IND/GROUP: HCPCS

## 2021-09-07 SDOH — ECONOMIC STABILITY: FOOD INSECURITY: ADDITIONAL INFORMATION: NO

## 2021-09-07 ASSESSMENT — PROBLEM AREAS IN DIABETES QUESTIONNAIRE (PAID)
FEELING THAT DIABETES IS TAKING UP TOO MUCH OF YOUR MENTAL AND PHYSICAL ENERGY EVERY DAY: 1
PAID-5 TOTAL SCORE: 10
FEELING DEPRESSED WHEN YOU THINK ABOUT LIVING WITH DIABETES: 2
WORRYING ABOUT THE FUTURE AND THE POSSIBILITY OF SERIOUS COMPLICATIONS: 3
FEELING SCARED WHEN YOU THINK ABOUT LIVING WITH DIABETES: 2
COPING WITH COMPLICATIONS OF DIABETES: 2

## 2021-09-07 NOTE — PROGRESS NOTES
Diabetes Self-Management Education Record    Participant Name: Lizzie Tenorio  Referring Provider: Della Vergara MD  Assessment/Evaluation Ratings:   1=Needs Instruction   4=Demonstrates Understanding/Competency  2=Needs Review   NC=Not Covered    3=Comprehends Key Points  N/A=Not Applicable  Topics/Learning Objectives Pre-session Assess Date:  Instructor initials/date   Instr. Date    Instructor initials/date Follow-up Post- session Eval Comments   Diabetes disease process & Treatment process:   -Define type of diabetes in simple terms.  - Describe the ABCs of  diabetes management  -Identify own type of diabetes  -Identify lifestyle changes/treatment options  -other:  9/7/21 [facility] [] All     []  []  []  []  []  9/7/21 [facility] 9/7/21    Developing strategies for Healthy coping/psychosocial issues:    -Describe feelings about living with diabetes  -Identify coping strategies and sources of stress  -Identify support needed & support network available  -Complete PAID-5 Diabetes questionnaire 9/7/21 [facility] [] All     []  []  []    []  9/7/21 [facility] 9/7/21          Prevention, detection & treatment of Chronic complications:    -Identify the prevention, detection and treatment for complications including immunizations, preventive eye, foot, dental and renal exams as indicated per the participant's duration of diabetes and health status.  -Define the natural course of diabetes and the relationship of blood glucose levels to long term complications of diabetes.   9/7/21 [facility] [] All     []            []  9/7/21 [facility] 9/7/21    Prevention, detection & treatment of acute complications:    -State the causes,signs & symptoms of hyper & hypoglycemia, and prevention & treatment strategies.   -Describe sick day guidelines  DKA /indications for ketone testing &  when to call physician  9/7/21 [facility] [] All     []      []    9/7/21 [facility] 9/7/21            -Identify severe weather/situation crisis  & diabetes supplies management  []      Using medications safely:   -State effects of diabetes medicines on blood glucose levels;  -List diabetes medication taken, action & side effects 9/7/21  [] All     []  []  9/7/21 [facility] 9/7/21 [facility]   Insulin/Injectables/glucagon  -Name appropriate injection sites; proper storage; supplies needed;  9/7/21 [facility]   []  9/7/21 [facility] 9/7/21 [facility]    Demonstrate proper technique  []      Monitoring blood glucose, interpreting and using results:   -Identify the purpose of testing   -Identify recommended & personal blood glucose targets & HgbA1C target levels  -State the Importance of logging blood glucose levels for pattern recognition;   -State benefits of reading/using pt generated health data  -Verbalize safe lancet disposal 9/7/21 [facility] [] All     []  []    []  []  []  9/7/21 [facility] 9/7/21 [facility]   -Demonstrate proper testing technique  []      Incorporating physical activity into lifestyle:   -State effect of exercise on blood glucose levels;   -State benefits of regular exercise;   -Define safety considerations/food choices if needed.  -Describe contraindications/maintenance of activity. 9/7/21 [facility] [] All     []  []    []  []  9/7/21 [facility] 9/7/21 [facility]   Incorporating nutritional management into lifestyle:   -Describe effect of type, amount & timing of food on blood glucose  -Describe methods for preparing and planning healthy meals  -Correctly read food labels  -Name 3 foods high in Carbohydrate  [] All       []    []    []  []      -Plan a carbohydrate-controlled meal based on individualized meal plan  -Demonstrate CHO counting/portion control   []  []      Developing strategies for problem solving to promote health/change behavior. -Identify 7 self-care behaviors; Personal health risk factors; Benefits, challenges & strategies for behavioral change and set an individualized goal selection.  9/7/21 [facility]       []  9/7/21 [facility] 9/7/21 [facility]  []Nutrition  []Monitoring  []Exercise  []Medication  []Other     Identified Barriers to learning/adherence to self management plan:    None  []  other    Instruction Method:  Lecture/Discussion, Power Point Presentation  and Handouts    Supporting Education Materials/Equipment Provided: Self-management manual and Nutritional Packet   []Wolof materials       [] services     []Other:      Encounter Type Date Attended Start Time End Time Comments No Show Dates   Assessment 9/7/21         Session 1 9/7/21 0900 AM 1130 AM      Session 2        1:1 DSMES          In person Follow-up         Gestational Diabetes         Individual MNT        Meter Instrx        Insulin Instrx           Additional Comments: [] Pt seen individually due to Covid-19 Safety precautions and no group session available.         Date:   Follow-up goal attainment based on patients initial DSMES goal    Dr Notified by [] EMR []Fax        []Post class Hgb A1C  []Medication compliance   []Plate method/meal plan compliance   []Able to state the number of Carbohydrate servings eaten at B,L,D   []Testing blood glucose as prescribed by PCP   []Exercise Routine   []Other:   []Other:     []Patient lost to follow-up  Dr Notified by []EMR []Fax     Personal Support Plan:      [x] Keep all scheduled doctor appointments   [] Make and keep appointments with specialists (foot, eye, dentist) as recommended   [] Consult my pharmacist about all new medications or to ask any medication questions   [] Get tested for sleep apnea   [] Seek help for:   [] Make an appointment with:   [] Attend smoking cessation classes or call 1-800-QUIT-NOW  [] Attend Diabetes Support Group   [] Use diabetes magazines, books, or credible web-sites like the ADA for more information  [] Increase exercise at home or join an exercise program:   [] Other:

## 2021-09-07 NOTE — PROGRESS NOTES
Diabetes Self-Management Education Record    Participant Name: Adriana Macias  Referring Provider: Ekta Dinh MD  Assessment/Evaluation Ratings:   1=Needs Instruction   4=Demonstrates Understanding/Competency  2=Needs Review   NC=Not Covered    3=Comprehends Key Points  N/A=Not Applicable  Topics/Learning Objectives Pre-session Assess Date:  Instructor initials/date  9/7/21 KMS   Instr. Date    Instructor initials/date  9/7/21 KMS Follow-up Post- session Eval Comments   Diabetes disease process & Treatment process:   -Define type of diabetes in simple terms.  - Describe the ABCs of  diabetes management  -Identify own type of diabetes  -Identify lifestyle changes/treatment options  -other:  1 [x] All     []  []  []  []  []  4 9/7/21 MC  Type 2 newly diagnosed  A1C 6.6%    Developing strategies for Healthy coping/psychosocial issues:    -Describe feelings about living with diabetes  -Identify coping strategies and sources of stress  -Identify support needed & support network available  -Complete PAID-5 Diabetes questionnaire 1 [x] All     []  []  []    []  4 9/7/21 KMS  Adriana Macias completed a Diabetes Self- Management Education Assessment on 9/7/21. Part of our assessment is having the patient complete the PAID (Problem Areas in Diabetes Scale)-5 survey. This tool  measures diabetes-related emotional distress a patient may be feeling. Adriana Macias scored 10   A total score of >8 indicates possible diabetes related emotional distress, which warrants further assessment and a referral to mental health professional for psychological support and treatment. PCP notified via EMR.     Prevention, detection & treatment of Chronic complications:    -Identify the prevention, detection and treatment for complications including immunizations, preventive eye, foot, dental and renal exams as indicated per the participant's duration of diabetes and health status.  -Define the natural course of diabetes and the relationship of blood glucose levels to long term complications of diabetes. 1 [x] All     []            []  4    Prevention, detection & treatment of acute complications:    -State the causes,signs & symptoms of hyper & hypoglycemia, and prevention & treatment strategies.   -Describe sick day guidelines  DKA /indications for ketone testing &  when to call physician  1 [x] All     []      []    4            -Identify severe weather/situation crisis  & diabetes supplies management  []      Using medications safely:   -State effects of diabetes medicines on blood glucose levels;  -List diabetes medication taken, action & side effects 1 [x] All     []  []   9/7/21 KMS  No medications currently prescribed   Diabetes medication booklet provided to patient    Insulin/Injectables/glucagon  -Name appropriate injection sites; proper storage; supplies needed;     []   N/A    Demonstrate proper technique  []      Monitoring blood glucose, interpreting and using results:   -Identify the purpose of testing   -Identify recommended & personal blood glucose targets & HgbA1C target levels  -State the Importance of logging blood glucose levels for pattern recognition;   -State benefits of reading/using pt generated health data  -Verbalize safe lancet disposal 1 [x] All     []  []    []  []  []   9/7/21 KMS  Not ordered to self-monitor, but reviewed blood glucose target ranges/A1C and encouraged patient to discuss option to monitor with her doctor if she desired.     -Demonstrate proper testing technique  []      Incorporating physical activity into lifestyle:   -State effect of exercise on blood glucose levels;   -State benefits of regular exercise;   -Define safety considerations/food choices if needed.  -Describe contraindications/maintenance of activity. 1 [x] All     []  []    []  []  4 9/7/21 KMS  Not currently exercising d/t shortness of breath--discussed several low intensity options.     Incorporating nutritional management into lifestyle:   -Describe effect of type, amount & timing of food on blood glucose  -Describe methods for preparing and planning healthy meals  -Correctly read food labels  -Name 3 foods high in Carbohydrate 1 [] All       []    []    []  []      -Plan a carbohydrate-controlled meal based on individualized meal plan  -Demonstrate CHO counting/portion control  1 []  []      Developing strategies for problem solving to promote health/change behavior. -Identify 7 self-care behaviors; Personal health risk factors; Benefits, challenges & strategies for behavioral change and set an individualized goal selection. 1 []      []Nutrition  []Monitoring  []Exercise  []Medication  []Other     Identified Barriers to learning/adherence to self management plan:    None  []  other    Instruction Method:  Lecture/Discussion, Power Point Presentation  and Handouts    Supporting Education Materials/Equipment Provided: Self-management manual and Nutritional Packet   []South Korean materials       [] services     []Other:      Encounter Type Date Attended Start Time End Time Comments No Show Dates   Assessment 9/7/21         Session 1 9/7/21 KMS 0900 AM 1130 AM      Session 2        1:1 DSMES          In person Follow-up         Gestational Diabetes         Individual MNT        Meter Instrx        Insulin Instrx           Additional Comments: [x] Pt attended group sessions. PCP notified via EMR.          Date:   Follow-up goal attainment based on patients initial DSMES goal    Dr Notified by [] EMR []Fax        []Post class Hgb A1C  []Medication compliance   []Plate method/meal plan compliance   []Able to state the number of Carbohydrate servings eaten at B,L,D   []Testing blood glucose as prescribed by PCP   []Exercise Routine   []Other:   []Other:     []Patient lost to follow-up  Dr Mason Saleh by []EMR []Fax     Personal Support Plan:      [x] Keep all scheduled doctor appointments   [] Make and keep appointments with specialists (foot, eye, dentist) as recommended   [] Consult my pharmacist about all new medications or to ask any medication questions   [] Get tested for sleep apnea   [] Seek help for:   [] Make an appointment with:   [] Attend smoking cessation classes or call 1-800-QUIT-NOW  [] Attend Diabetes Support Group   [] Use diabetes magazines, books, or credible web-sites like the ADA for more information  [] Increase exercise at home or join an exercise program:   [] Other:

## 2021-09-07 NOTE — LETTER
[facility]- Diabetes Education Department Initial Diabetes Education Letter    2021       Re:     Stew Luciano         :  1955  Dear Dr. Kiersten Clemons,                    Thank you for referring your patient, Stew Luciano, for diabetes education. Stew Luciano has completed their comprehensive education plan today which included the topics below:    Nursing/Medical [x]      Nutrition [x]  [] Diabetes disease process & treatment   [] Diabetes medication use and safety   [] Self-monitoring blood glucose/interpreting results  [] Prevention, detection and treatment of acute complications  [] Prevention, detection and treatment of chronic complications  [] Developing strategies to address psychosocial issues    [] Nutritional management: basic principles   [] Carbohydrate counting, plate method, label reading  [] Benefits of/ways to incorporate physical activity  [] Problem solving and preparing for crisis situations  [] Diabetes supply management  [] Goal setting and behavior modification         PAID -5 (Problem Areas in Diabetes) Survey Results  10  A total score of 8 or greater indicates possible diabetes related emotional distress which warrants further assessment. [x] [address] and Crisis Resource information provided. Comments:     PATIENT SELECTED GOAL:   [x]  I will follow my meal plan and measure my carbohydrate foods. []  I will increase my activity to:  []  I will check my blood glucose as ordered by my doctor. []  I will take my medications at the correct times as ordered by my doctor.   []  Other:      DIABETES SELF-MANAGEMENT SUPPORT PLAN/REFERRALS (patient identified):  [x] Keep my scheduled visits with my doctor   [] Make and keep appointments with specialists (foot, eye, dentist) as recommended  [] Consult my pharmacist with all new medications and/or any medication questions  [] Get tested for sleep apnea  [] Seek help for:   [] Make an appointment with:

## 2021-09-08 ENCOUNTER — HOSPITAL ENCOUNTER (OUTPATIENT)
Dept: DIABETES SERVICES | Age: 66
Setting detail: THERAPIES SERIES
Discharge: HOME | End: 2021-09-08
Payer: MEDICARE

## 2021-09-08 ENCOUNTER — HOSPITAL ENCOUNTER (OUTPATIENT)
Age: 66
Discharge: HOME | End: 2021-09-08
Payer: MEDICARE

## 2021-09-08 LAB
ANION GAP SERPL CALCULATED.3IONS-SCNC: 10 MMOL/L (ref 7–16)
BUN BLDV-MCNC: 25 MG/DL (ref 6–23)
CALCIUM SERPL-MCNC: 9.1 MG/DL (ref 8.6–10.2)
CHLORIDE BLD-SCNC: 102 MMOL/L (ref 98–107)
CHOLESTEROL, TOTAL: 179 MG/DL (ref 0–199)
CO2: 28 MMOL/L (ref 22–29)
CREAT SERPL-MCNC: 1.3 MG/DL (ref 0.5–1)
CREATININE URINE: 92 MG/DL (ref 29–226)
GFR AFRICAN AMERICAN: 49
GFR NON-AFRICAN AMERICAN: 41 ML/MIN/1.73
GLUCOSE BLD-MCNC: 118 MG/DL (ref 74–99)
HBA1C MFR BLD: 5.9 % (ref 4–5.6)
HDLC SERPL-MCNC: 38 MG/DL
LDL CHOLESTEROL CALCULATED: 119 MG/DL (ref 0–99)
MICROALBUMIN UR-MCNC: <12 MG/L
POTASSIUM SERPL-SCNC: 4.4 MMOL/L (ref 3.5–5)
SODIUM BLD-SCNC: 140 MMOL/L (ref 132–146)
TRIGL SERPL-MCNC: 108 MG/DL (ref 0–149)
VLDLC SERPL CALC-MCNC: 22 MG/DL

## 2021-09-08 PROCEDURE — 82570 ASSAY OF URINE CREATININE: CPT

## 2021-09-08 PROCEDURE — 80048 BASIC METABOLIC PNL TOTAL CA: CPT

## 2021-09-08 PROCEDURE — G0109 DIAB MANAGE TRN IND/GROUP: HCPCS

## 2021-09-08 PROCEDURE — 80061 LIPID PANEL: CPT

## 2021-09-08 PROCEDURE — 82044 UR ALBUMIN SEMIQUANTITATIVE: CPT

## 2021-09-08 PROCEDURE — 83036 HEMOGLOBIN GLYCOSYLATED A1C: CPT

## 2021-09-08 PROCEDURE — 36415 COLL VENOUS BLD VENIPUNCTURE: CPT

## 2021-09-08 NOTE — PROGRESS NOTES
Diabetes Self-Management Education Record    Participant Name: Joni Victoria  Referring Provider: James Wyman MD  Assessment/Evaluation Ratings:   1=Needs Instruction   4=Demonstrates Understanding/Competency  2=Needs Review   NC=Not Covered    3=Comprehends Key Points  N/A=Not Applicable  Topics/Learning Objectives Pre-session Assess Date:  Instructor initials/date  9/7/21 KMS   Instr. Date    Instructor initials/date  9/7/21 KMS Follow-up Post- session Eval Comments   Diabetes disease process & Treatment process:   -Define type of diabetes in simple terms.  - Describe the ABCs of  diabetes management  -Identify own type of diabetes  -Identify lifestyle changes/treatment options  -other:  1 [x] All     []  []  []  []  []  4 9/7/21   Type 2 newly diagnosed  A1C 6.6%    Developing strategies for Healthy coping/psychosocial issues:    -Describe feelings about living with diabetes  -Identify coping strategies and sources of stress  -Identify support needed & support network available  -Complete PAID-5 Diabetes questionnaire 1 [x] All     []  []  []    []  4 9/7/21 KMS  Joni Victoria completed a Diabetes Self- Management Education Assessment on 9/7/21. Part of our assessment is having the patient complete the PAID (Problem Areas in Diabetes Scale)-5 survey. This tool  measures diabetes-related emotional distress a patient may be feeling. Joni Victoria scored 10   A total score of >8 indicates possible diabetes related emotional distress, which warrants further assessment and a referral to mental health professional for psychological support and treatment. PCP notified via EMR.     Prevention, detection & treatment of Chronic complications:    -Identify the prevention, detection and treatment for complications including immunizations, preventive eye, foot, dental and renal exams as indicated per the participant's duration of diabetes and health status.  -Define the natural course of diabetes and the relationship of blood glucose levels to long term complications of diabetes. 1 [x] All     []            []  4    Prevention, detection & treatment of acute complications:    -State the causes,signs & symptoms of hyper & hypoglycemia, and prevention & treatment strategies.   -Describe sick day guidelines  DKA /indications for ketone testing &  when to call physician  1 [x] All     []      []    4            -Identify severe weather/situation crisis  & diabetes supplies management  []      Using medications safely:   -State effects of diabetes medicines on blood glucose levels;  -List diabetes medication taken, action & side effects 1 [x] All     []  []   9/7/21 KMS  No medications currently prescribed   Diabetes medication booklet provided to patient    Insulin/Injectables/glucagon  -Name appropriate injection sites; proper storage; supplies needed;     []   N/A    Demonstrate proper technique  []      Monitoring blood glucose, interpreting and using results:   -Identify the purpose of testing   -Identify recommended & personal blood glucose targets & HgbA1C target levels  -State the Importance of logging blood glucose levels for pattern recognition;   -State benefits of reading/using pt generated health data  -Verbalize safe lancet disposal 1 [x] All     []  []    []  []  []   9/7/21 KMS  Not ordered to self-monitor, but reviewed blood glucose target ranges/A1C and encouraged patient to discuss option to monitor with her doctor if she desired.     -Demonstrate proper testing technique  []      Incorporating physical activity into lifestyle:   -State effect of exercise on blood glucose levels;   -State benefits of regular exercise;   -Define safety considerations/food choices if needed.  -Describe contraindications/maintenance of activity. 1 [x] All     []  []    []  []  4 9/7/21 KMS  Not currently exercising d/t shortness of breath--discussed several low intensity options.     Incorporating nutritional management into lifestyle:   -Describe effect of type, amount & timing of food on blood glucose  -Describe methods for preparing and planning healthy meals  -Correctly read food labels  -Name 3 foods high in Carbohydrate 1 [x] All       []    []    []  []  4 9/8/21 CS  Pt attended Session 2. Participated in group activities on Diabetes Plate Method and healthy food choices. Demonstrated understanding of carb counting using food lists with carbohydrate serving sizes. Read CHO content of food correctly on nutrition facts using various food labels . Questions answered. Followed along and took notes.   -Plan a carbohydrate-controlled meal based on individualized meal plan  -Demonstrate CHO counting/portion control  1 []  []      Developing strategies for problem solving to promote health/change behavior. -Identify 7 self-care behaviors; Personal health risk factors; Benefits, challenges & strategies for behavioral change and set an individualized goal selection. 1 [x]  4  9/8/21 CS  [x]Nutrition  []Monitoring  []Exercise  []Medication  []Other     Identified Barriers to learning/adherence to self management plan:    None  []  other    Instruction Method:  Lecture/Discussion, Power Point Presentation  and Handouts    Supporting Education Materials/Equipment Provided: Self-management manual and Nutritional Packet   []Mongolian materials       [] services     []Other:      Encounter Type Date Attended Start Time End Time Comments No Show Dates   Assessment 9/7/21         Session 1 9/7/21 KMS 0900 AM 1130 AM      Session 2 9/8/21 CS 0900 1130     1:1 DSMES          In person Follow-up         Gestational Diabetes         Individual MNT        Meter Instrx        Insulin Instrx           Additional Comments: [x] Pt attended group sessions. PCP notified via EMR.          Date:   Follow-up goal attainment based on patients initial DSMES goal    Dr Notified by [] EMR []Fax        []Post class Hgb A1C  []Medication compliance   []Plate method/meal plan compliance   []Able to state the number of Carbohydrate servings eaten at B,L,D   []Testing blood glucose as prescribed by PCP   []Exercise Routine   []Other:   []Other:     []Patient lost to follow-up  Dr Notified by []EMR []Fax     Personal Support Plan:      [x] Keep all scheduled doctor appointments   [] Make and keep appointments with specialists (foot, eye, dentist) as recommended   [] Consult my pharmacist about all new medications or to ask any medication questions   [] Get tested for sleep apnea   [] Seek help for:   [] Make an appointment with:   [] Attend smoking cessation classes or call 1-800-QUIT-NOW  [] Attend Diabetes Support Group   [] Use diabetes magazines, books, or credible web-sites like the ADA for more information  [] Increase exercise at home or join an exercise program:   [] Other:

## 2021-09-27 ENCOUNTER — HOSPITAL ENCOUNTER (OUTPATIENT)
Dept: DIABETES SERVICES | Age: 66
Setting detail: THERAPIES SERIES
Discharge: HOME | End: 2021-09-27
Payer: MEDICARE

## 2021-09-27 PROCEDURE — G0108 DIAB MANAGE TRN  PER INDIV: HCPCS

## 2021-09-27 PROCEDURE — 97802 MEDICAL NUTRITION INDIV IN: CPT

## 2021-09-27 NOTE — LETTER
[facility]) - Diabetes Education    2021     Re:     Vivienne Weller  :  1955    Dear Dr. Nickie Arango: Thank you for referring your patient, Vivienne Weller, to Diabetes Education Medical Nutrition Therapy. Your patient was here on 21, and the following were addressed:        [] Nutrition as Related to Diabetes    [x] Carbohydrate Counting     [x] Free Food List    [] Combination Foods    [] Fast Foods    [] Weighing and measuring    [x] Meal Planning    [] Using Food Labels - Label Reading  [] Diabetes Education Class Schedule    [x] Diet Instruction       [x]  Diet instructed provided on: 0431-3445 calorie meal plan (emphasizing 1400 kcals for weight loss), 3 carb choices per meal and 1 carb choice per snack. Patient encouraged to limit fat intake to 5 choices/day and limit protein intake to 6-8 oz/day, choosing lean protein and heart-healthy fats when able. Upon completion of this session, the following evaluation of your patients progress was made:    ASSESSMENT:  [x]  verbalizes understanding of diet principles  [x]  understands the relationship between diet and health  [x]  identifies proper food choices  [x]  identifies needed diet changes  [x]  expresses intentions to comply with diet guidelines  [x]  able to provide correct answers when asked    GOAL:  [x]  to follow individual meal plan  []  to weigh and measure food portions  []  to call with further questions  []  to attend diabetes education class sessions 1 and/or 2    PATIENT EDUCATION MATERIALS PROVIDED:  [x]  plate carb counting meal plan  []  low fat guidelines  [x]  carb counting sheet  []  low sodium guidelines  []  Other:    Patient is encouraged to call with further questions or call and re-schedule other sessions within a year from original date.     Thank you for referring this patient to our program.  Please do not hesitate to call if you have any questions at  (SEY or DELORES) or [phone] [facility]).         Sincerely,         Registered Dietitian Nutritionists:          []  JACKELINE [facility]          []   Amy Martinez MS JACKELINE MCKAY [facility]   [x]  JACKELINE Ocampo   []  JACKELINE Romaon  []   JACKELINE Dominguez           Diabetes

## 2021-09-27 NOTE — PROGRESS NOTES
Diabetes Self-Management Education Record    Participant Name: Андрей Mckeon  Referring Provider: Jayna Maria MD  Assessment/Evaluation Ratings:   1=Needs Instruction   4=Demonstrates Understanding/Competency  2=Needs Review   NC=Not Covered    3=Comprehends Key Points  N/A=Not Applicable  Topics/Learning Objectives Pre-session Assess Date:  Instructor initials/date  9/7/21 KMS   Instr. Date    Instructor initials/date  9/7/21 KMS Follow-up        9/27/21   Post- session Eval Comments   Diabetes disease process & Treatment process:   -Define type of diabetes in simple terms.  - Describe the ABCs of  diabetes management  -Identify own type of diabetes  -Identify lifestyle changes/treatment options  -other:  1 [x] All     []  []  []  []  []  4 9/7/21   Type 2 newly diagnosed  A1C 6.6%    Developing strategies for Healthy coping/psychosocial issues:    -Describe feelings about living with diabetes  -Identify coping strategies and sources of stress  -Identify support needed & support network available  -Complete PAID-5 Diabetes questionnaire 1 [x] All     []  []  []    []  4 9/7/21 S  Андрей Mckeon completed a Diabetes Self- Management Education Assessment on 9/7/21. Part of our assessment is having the patient complete the PAID (Problem Areas in Diabetes Scale)-5 survey. This tool  measures diabetes-related emotional distress a patient may be feeling. Андрей Mckeon scored 10   A total score of >8 indicates possible diabetes related emotional distress, which warrants further assessment and a referral to mental health professional for psychological support and treatment. PCP notified via EMR.     Prevention, detection & treatment of Chronic complications:    -Identify the prevention, detection and treatment for complications including immunizations, preventive eye, foot, dental and renal exams as indicated per the participant's duration of diabetes and health status.  -Define the natural course of diabetes and the relationship of blood glucose levels to long term complications of diabetes. 1 [x] All     []            []  4    Prevention, detection & treatment of acute complications:    -State the causes,signs & symptoms of hyper & hypoglycemia, and prevention & treatment strategies.   -Describe sick day guidelines  DKA /indications for ketone testing &  when to call physician  1 [x] All     []      []    4            -Identify severe weather/situation crisis  & diabetes supplies management  []      Using medications safely:   -State effects of diabetes medicines on blood glucose levels;  -List diabetes medication taken, action & side effects 1 [x] All     []  []   9/7/21 KMS  No medications currently prescribed   Diabetes medication booklet provided to patient    Insulin/Injectables/glucagon  -Name appropriate injection sites; proper storage; supplies needed;     []   N/A    Demonstrate proper technique  []      Monitoring blood glucose, interpreting and using results:   -Identify the purpose of testing   -Identify recommended & personal blood glucose targets & HgbA1C target levels  -State the Importance of logging blood glucose levels for pattern recognition;   -State benefits of reading/using pt generated health data  -Verbalize safe lancet disposal 1 [x] All     []  []    []  []  []   9/7/21 KMS  Not ordered to self-monitor, but reviewed blood glucose target ranges/A1C and encouraged patient to discuss option to monitor with her doctor if she desired.     -Demonstrate proper testing technique  []      Incorporating physical activity into lifestyle:   -State effect of exercise on blood glucose levels;   -State benefits of regular exercise;   -Define safety considerations/food choices if needed.  -Describe contraindications/maintenance of activity. 1 [x] All     []  []    []  [] 4 4 9/7/21 KMS  Not currently exercising d/t shortness of breath--discussed several low intensity options.     Incorporating nutritional management into lifestyle:   -Describe effect of type, amount & timing of food on blood glucose  -Describe methods for preparing and planning healthy meals  -Correctly read food labels  -Name 3 foods high in Carbohydrate 1 [x] All       []    []    []  [] 4 4 9/8/21 CS  Pt attended Session 2. Participated in group activities on Diabetes Plate Method and healthy food choices. Demonstrated understanding of carb counting using food lists with carbohydrate serving sizes. Read CHO content of food correctly on nutrition facts using various food labels . Questions answered. Followed along and took notes.   -Plan a carbohydrate-controlled meal based on individualized meal plan  -Demonstrate CHO counting/portion control  1 []  [] 4  9/27/21 [address]  Patient attended individual MNT appointment. Instruction provided on 1400 calories, carb-consistent meal plan. Menu ideas provided. Patient able to plan 3 carb choices per meal and 1 carb choice per snack correctly without assistance. Questions answered. Patient followed along and took notes. Will follow up in 2 weeks. Developing strategies for problem solving to promote health/change behavior. -Identify 7 self-care behaviors; Personal health risk factors; Benefits, challenges & strategies for behavioral change and set an individualized goal selection.  1 [x]  4  9/8/21 CS  [x]Nutrition  []Monitoring  []Exercise  []Medication  []Other     Identified Barriers to learning/adherence to self management plan:    None  []  other    Instruction Method:  Lecture/Discussion, Power Point Presentation  and Handouts    Supporting Education Materials/Equipment Provided: Self-management manual and Nutritional Packet   []Uzbek materials       [] services     []Other:      Encounter Type Date Attended Start Time End Time Comments No Show Dates   Assessment 9/7/21         Session 1 9/7/21 KMS 0900 AM 1130 AM      Session 2 9/8/21 CS 0900 1130     1:1 DSMES          In person Follow-up         Gestational Diabetes         Individual MNT 9/27/21 [address] 0900 AM 0945      Meter Instrx        Insulin Instrx           Additional Comments: [x] Pt attended group sessions. PCP notified via EMR.          Date:   Follow-up goal attainment based on patients initial DSMES goal     Notified by [] EMR []Fax        []Post class Hgb A1C  []Medication compliance   [x]Plate method/meal plan compliance   [x]Able to state the number of Carbohydrate servings eaten at B,L,D   []Testing blood glucose as prescribed by PCP   []Exercise Routine   -Other:   -Other:     -Patient lost to follow-up   Notified by -EMR -Fax     Personal Support Plan:      [x] Keep all scheduled doctor appointments   [] Make and keep appointments with specialists (foot, eye, dentist) as recommended   [] Consult my pharmacist about all new medications or to ask any medication questions   [] Get tested for sleep apnea   [] Seek help for:   [] Make an appointment with:   [] Attend smoking cessation classes or call [phone]-QUIT-NOW  [] Attend Diabetes Support Group   [] Use diabetes magazines, books, or credible web-sites like the ADA for more information  [] Increase exercise at home or join an exercise program:   [] Other:

## 2021-10-11 ENCOUNTER — HOSPITAL ENCOUNTER (OUTPATIENT)
Age: 66
Discharge: HOME | End: 2021-10-11
Payer: MEDICARE

## 2021-10-11 LAB
ANION GAP SERPL CALCULATED.3IONS-SCNC: 13 MMOL/L (ref 7–16)
BUN BLDV-MCNC: 27 MG/DL (ref 6–23)
CALCIUM SERPL-MCNC: 9 MG/DL (ref 8.6–10.2)
CHLORIDE BLD-SCNC: 103 MMOL/L (ref 98–107)
CO2: 23 MMOL/L (ref 22–29)
CREAT SERPL-MCNC: 1.3 MG/DL (ref 0.5–1)
GFR AFRICAN AMERICAN: 49
GFR NON-AFRICAN AMERICAN: 41 ML/MIN/1.73
GLUCOSE BLD-MCNC: 133 MG/DL (ref 74–99)
POTASSIUM SERPL-SCNC: 4.3 MMOL/L (ref 3.5–5)
SODIUM BLD-SCNC: 139 MMOL/L (ref 132–146)

## 2021-10-11 PROCEDURE — 80048 BASIC METABOLIC PNL TOTAL CA: CPT

## 2021-10-11 PROCEDURE — 36415 COLL VENOUS BLD VENIPUNCTURE: CPT

## 2021-10-12 ENCOUNTER — TELEPHONE (OUTPATIENT)
Dept: CARDIAC CATH/INVASIVE PROCEDURES | Age: 66
End: 2021-10-12

## 2021-10-13 ENCOUNTER — HOSPITAL ENCOUNTER (OUTPATIENT)
Dept: CARDIAC CATH/INVASIVE PROCEDURES | Age: 66
Discharge: HOME | End: 2021-10-13
Payer: MEDICARE

## 2021-10-13 ENCOUNTER — ANESTHESIA EVENT (OUTPATIENT)
Dept: CARDIAC CATH/INVASIVE PROCEDURES | Age: 66
End: 2021-10-13

## 2021-10-13 ENCOUNTER — ANESTHESIA (OUTPATIENT)
Dept: CARDIAC CATH/INVASIVE PROCEDURES | Age: 66
End: 2021-10-13

## 2021-10-13 VITALS
RESPIRATION RATE: 12 BRPM | OXYGEN SATURATION: 98 % | HEART RATE: 54 BPM | DIASTOLIC BLOOD PRESSURE: 58 MMHG | TEMPERATURE: 96.8 F | SYSTOLIC BLOOD PRESSURE: 98 MMHG

## 2021-10-13 VITALS
SYSTOLIC BLOOD PRESSURE: 125 MMHG | DIASTOLIC BLOOD PRESSURE: 73 MMHG | OXYGEN SATURATION: 100 % | RESPIRATION RATE: 15 BRPM

## 2021-10-13 PROCEDURE — 92960 CARDIOVERSION ELECTRIC EXT: CPT | Performed by: INTERNAL MEDICINE

## 2021-10-13 PROCEDURE — 3700000001 HC ADD 15 MINUTES (ANESTHESIA)

## 2021-10-13 PROCEDURE — 3700000000 HC ANESTHESIA ATTENDED CARE

## 2021-10-13 PROCEDURE — 2580000003 HC RX 258: Performed by: NURSE ANESTHETIST, CERTIFIED REGISTERED

## 2021-10-13 PROCEDURE — 2709999900 HC NON-CHARGEABLE SUPPLY

## 2021-10-13 PROCEDURE — 6360000002 HC RX W HCPCS: Performed by: NURSE ANESTHETIST, CERTIFIED REGISTERED

## 2021-10-13 PROCEDURE — 93005 ELECTROCARDIOGRAM TRACING: CPT | Performed by: INTERNAL MEDICINE

## 2021-10-13 RX ORDER — PROPOFOL 10 MG/ML
INJECTION, EMULSION INTRAVENOUS PRN
Status: DISCONTINUED | OUTPATIENT
Start: 2021-10-13 | End: 2021-10-13 | Stop reason: SDUPTHER

## 2021-10-13 RX ORDER — SODIUM CHLORIDE 9 MG/ML
INJECTION, SOLUTION INTRAVENOUS CONTINUOUS PRN
Status: DISCONTINUED | OUTPATIENT
Start: 2021-10-13 | End: 2021-10-13 | Stop reason: SDUPTHER

## 2021-10-13 RX ADMIN — SODIUM CHLORIDE: 9 INJECTION, SOLUTION INTRAVENOUS at 11:05

## 2021-10-13 RX ADMIN — PROPOFOL 80 MG: 10 INJECTION, EMULSION INTRAVENOUS at 11:19

## 2021-10-13 ASSESSMENT — ENCOUNTER SYMPTOMS: SHORTNESS OF BREATH: 1

## 2021-10-13 NOTE — ANESTHESIA PRE PROCEDURE
Department of Anesthesiology  Preprocedure Note       Name:  Aleta Jacobo   Age:  74 y.o.  :  1955                                          MRN:  [id number]         Date:  10/13/2021      Surgeon: * Surgery not found *    Procedure:  DCCV    Medications prior to admission:   Prior to Admission medications    Medication Sig Start Date End Date Taking? Authorizing Provider   rivaroxaban (XARELTO) 15 MG TABS tablet Take 1 tablet by mouth daily  Patient taking differently: Take 20 mg by mouth daily  21  Yes Be Rivero MD   lactobacillus (CULTURELLE) CAPS capsule Take 1 capsule by mouth daily 21  Yes Be Rivero MD   metoprolol succinate (TOPROL XL) 50 MG extended release tablet Take 3 tablets by mouth 2 times daily  Patient taking differently: Take 200 mg by mouth 2 times daily  21  Yes Be Rivero MD   digoxin [facility]) 125 MCG tablet Take 1 tablet by mouth daily 21  Yes Be Rivero MD   losartan-hydrochlorothiazide [facility]) 100-25 MG per tablet Take 1 tablet by mouth nightly  18  Yes Historical Provider, MD   amLODIPine (NORVASC) 5 MG tablet Inhale into the lungs 16  Historical Provider, MD       Current medications:    Current Outpatient Medications   Medication Sig Dispense Refill    rivaroxaban (XARELTO) 15 MG TABS tablet Take 1 tablet by mouth daily (Patient taking differently: Take 20 mg by mouth daily ) 42 tablet 0    lactobacillus (CULTURELLE) CAPS capsule Take 1 capsule by mouth daily 30 capsule 0    metoprolol succinate (TOPROL XL) 50 MG extended release tablet Take 3 tablets by mouth 2 times daily (Patient taking differently: Take 200 mg by mouth 2 times daily ) 30 tablet 3    digoxin (LANOXIN) 125 MCG tablet Take 1 tablet by mouth daily 30 tablet 3    losartan-hydrochlorothiazide (HYZAAR) 100-25 MG per tablet Take 1 tablet by mouth nightly        No current facility-administered medications for this encounter.        Allergies:  No Known Allergies    Problem List:    Patient Active Problem List   Diagnosis Code    Abnormal EKG R94.31    Chest pain R07.9    Dyspnea R06.00    Nausea R11.0    GERD (gastroesophageal reflux disease) K21.9    Obesity E66.9    Atrial fibrillation with RVR (HCC) I48.91    Multiple sclerosis ([facility] G35    Acute renal failure (ARF) ([facility] N17.9       Past Medical History:        Diagnosis Date    A-fib ([facility] 2021    Diabetes mellitus ([facility]     HTN (hypertension)     MS (multiple sclerosis) ([facility]        Past Surgical History:        Procedure Laterality Date    CARDIOVERSION  2021    DR. HINSON SUCCESFUL TO SR, ONE SHOCK 200 JOULES     SECTION  1984    CHOLECYSTECTOMY      JOINT REPLACEMENT Bilateral        Social History:    Social History     Tobacco Use    Smoking status: Never Smoker    Smokeless tobacco: Never Used   Substance Use Topics    Alcohol use: Not Currently                                Counseling given: Not Answered      Vital Signs (Current): There were no vitals filed for this visit.                                            BP Readings from Last 3 Encounters:   21 130/81   21 116/75   21 116/75       NPO Status:   > 8hrs                                                                               BMI:   Wt Readings from Last 3 Encounters:   21 204 lb (92.5 kg)   21 216 lb (98 kg)   21 216 lb (98 kg)     There is no height or weight on file to calculate BMI.    CBC:   Lab Results   Component Value Date    WBC 8.5 2021    RBC 4.04 2021    HGB 11.8 2021    HCT 36.3 2021    MCV 89.9 2021    RDW 15.1 2021     2021       CMP:   Lab Results   Component Value Date     10/11/2021    K 4.3 10/11/2021    K 3.9 04/15/2020     10/11/2021    CO2 23 10/11/2021    BUN 27 10/11/2021    CREATININE 1.3 10/11/2021    GFRAA 49 10/11/2021    LABGLOM 41 10/11/2021    GLUCOSE 133 10/11/2021    PROT 7.0 02/03/2021    CALCIUM 9.0 10/11/2021    BILITOT 0.3 02/03/2021    ALKPHOS 65 02/03/2021    AST 17 02/03/2021    ALT 16 02/03/2021       POC Tests: No results for input(s): POCGLU, POCNA, POCK, POCCL, POCBUN, POCHEMO, POCHCT in the last 72 hours. Coags:   Lab Results   Component Value Date    PROTIME 12.8 02/04/2021    INR 1.1 02/04/2021       HCG (If Applicable): No results found for: PREGTESTUR, PREGSERUM, HCG, HCGQUANT     ABGs: No results found for: PHART, PO2ART, LVU8GBE, OFO2MUS, BEART, J7VKXQCX     Type & Screen (If Applicable):  No results found for: LABABO, LABRH    Drug/Infectious Status (If Applicable):  No results found for: HIV, HEPCAB    COVID-19 Screening (If Applicable):   Lab Results   Component Value Date    COVID19 Not Detected 03/02/2021         Anesthesia Evaluation  Patient summary reviewed and Nursing notes reviewed no history of anesthetic complications:   Airway: Mallampati: II  TM distance: >3 FB   Neck ROM: full  Mouth opening: > = 3 FB Dental:          Pulmonary:   (+) shortness of breath:  decreased breath sounds,                             Cardiovascular:  Exercise tolerance: poor (<4 METS),   (+) hypertension:, dysrhythmias: atrial fibrillation,         Rhythm: irregular  Rate: normal                    Neuro/Psych:   Negative Neuro/Psych ROS              GI/Hepatic/Renal:   (+) GERD: well controlled,           Endo/Other:    (+) DiabetesType II DM, , .                 Abdominal:             Vascular: negative vascular ROS. Other Findings:               Anesthesia Plan      MAC     ASA 3       Induction: intravenous. MIPS: Prophylactic antiemetics administered. Anesthetic plan and risks discussed with patient.                       Reema Ferro, APRN - CRNA   10/13/2021

## 2021-10-13 NOTE — ANESTHESIA POSTPROCEDURE EVALUATION
Department of Anesthesiology  Postprocedure Note    Patient: Mounika Dc  MRN: [id number]  YOB: 1955  Date of evaluation: 10/13/2021  Time:  11:33 AM     Procedure Summary     Date: 10/13/21 Room / Location: [facility] CATH LAB    Anesthesia Start: 1105 Anesthesia Stop: 1905    Procedure: CARDIOVERSION WITH ANESTHESIA Diagnosis: Unspecified atrial fibrillation    Scheduled Providers:  Responsible Provider: Aicha Mullen MD    Anesthesia Type: MAC ASA Status: 3          Anesthesia Type: MAC    Bryan Phase I:      Bryan Phase II:      Last vitals: Reviewed and per EMR flowsheets.        Anesthesia Post Evaluation    Patient location during evaluation: PACU  Patient participation: complete - patient participated  Level of consciousness: awake  Pain score: 0  Airway patency: patent  Nausea & Vomiting: no nausea  Complications: no  Cardiovascular status: hemodynamically stable  Respiratory status: acceptable  Hydration status: stable

## 2021-10-14 LAB
EKG ATRIAL RATE: 52 BPM
EKG ATRIAL RATE: 97 BPM
EKG P AXIS: 0 DEGREES
EKG P-R INTERVAL: 180 MS
EKG Q-T INTERVAL: 354 MS
EKG Q-T INTERVAL: 438 MS
EKG QRS DURATION: 70 MS
EKG QRS DURATION: 86 MS
EKG QTC CALCULATION (BAZETT): 407 MS
EKG QTC CALCULATION (BAZETT): 421 MS
EKG R AXIS: -2 DEGREES
EKG R AXIS: 46 DEGREES
EKG T AXIS: -2 DEGREES
EKG T AXIS: 6 DEGREES
EKG VENTRICULAR RATE: 52 BPM
EKG VENTRICULAR RATE: 85 BPM

## 2021-11-30 NOTE — OP NOTE
Op Note     Date of Procedure: 10/13/2021     Pre-Op Diagnosis: *Persistent atrial fibrillation, I48.1*     Post-Op Diagnosis: Same       *Procedure: Direct current cardioversion*     *Physician: Dr. Kiel Meyer     Assistant:   None     Anesthesia: *IV propofol*     Estimated Blood Loss (mL): 0     Complications: None     Specimens:   None     Implants:  None      Drains: None     Detailed Description of Procedure: The patient arrived at the cardiac suite in a fasting and nonsedated state and received IV propofol under anesthesia supervision. Once adequate anesthesia was achieved, the patient received a single 200 J anteroposterior shock, which converted atrial fibrillation to sinus rhythm. A preprocedure ECG showed atrial fibrillation with a controlled rate and a post procedure ECG showed sinus rhythm. The patient awoke uneventfully and was discharged to home. All outpatient medications were continued at the current dosage including anticoagulation. A follow-up appointment was scheduled in 4 to 6 weeks. There were no immediate complications.

## 2021-12-21 ENCOUNTER — HOSPITAL ENCOUNTER (OUTPATIENT)
Age: 66
Discharge: HOME | End: 2021-12-21
Payer: MEDICARE

## 2021-12-21 LAB
ANION GAP SERPL CALCULATED.3IONS-SCNC: 12 MMOL/L (ref 7–16)
BUN BLDV-MCNC: 25 MG/DL (ref 6–23)
CALCIUM SERPL-MCNC: 9.1 MG/DL (ref 8.6–10.2)
CHLORIDE BLD-SCNC: 105 MMOL/L (ref 98–107)
CO2: 27 MMOL/L (ref 22–29)
CREAT SERPL-MCNC: 1.4 MG/DL (ref 0.5–1)
GFR AFRICAN AMERICAN: 45
GFR NON-AFRICAN AMERICAN: 37 ML/MIN/1.73
GLUCOSE BLD-MCNC: 126 MG/DL (ref 74–99)
HCT VFR BLD CALC: 33.4 % (ref 34–48)
HEMOGLOBIN: 10.9 G/DL (ref 11.5–15.5)
INR BLD: 2
MCH RBC QN AUTO: 29 PG (ref 26–35)
MCHC RBC AUTO-ENTMCNC: 32.6 % (ref 32–34.5)
MCV RBC AUTO: 88.8 FL (ref 80–99.9)
PDW BLD-RTO: 14.2 FL (ref 11.5–15)
PLATELET # BLD: 263 E9/L (ref 130–450)
PMV BLD AUTO: 10.6 FL (ref 7–12)
POTASSIUM SERPL-SCNC: 3.6 MMOL/L (ref 3.5–5)
PROTHROMBIN TIME: 21.3 SEC (ref 9.3–12.4)
RBC # BLD: 3.76 E12/L (ref 3.5–5.5)
SODIUM BLD-SCNC: 144 MMOL/L (ref 132–146)
WBC # BLD: 10.7 E9/L (ref 4.5–11.5)

## 2021-12-21 PROCEDURE — 85027 COMPLETE CBC AUTOMATED: CPT

## 2021-12-21 PROCEDURE — 80048 BASIC METABOLIC PNL TOTAL CA: CPT

## 2021-12-21 PROCEDURE — 85610 PROTHROMBIN TIME: CPT

## 2021-12-21 PROCEDURE — 36415 COLL VENOUS BLD VENIPUNCTURE: CPT

## 2022-01-31 ENCOUNTER — HOSPITAL ENCOUNTER (OUTPATIENT)
Age: 67
Discharge: HOME | End: 2022-01-31
Payer: MEDICARE

## 2022-01-31 LAB
ALBUMIN SERPL-MCNC: 4 G/DL (ref 3.5–5.2)
ALP BLD-CCNC: 89 U/L (ref 35–104)
ALT SERPL-CCNC: 15 U/L (ref 0–32)
ANION GAP SERPL CALCULATED.3IONS-SCNC: 12 MMOL/L (ref 7–16)
AST SERPL-CCNC: 17 U/L (ref 0–31)
BILIRUB SERPL-MCNC: 0.8 MG/DL (ref 0–1.2)
BUN BLDV-MCNC: 27 MG/DL (ref 6–23)
CALCIUM SERPL-MCNC: 9.6 MG/DL (ref 8.6–10.2)
CHLORIDE BLD-SCNC: 103 MMOL/L (ref 98–107)
CHOLESTEROL, TOTAL: 120 MG/DL (ref 0–199)
CO2: 25 MMOL/L (ref 22–29)
CREAT SERPL-MCNC: 1.2 MG/DL (ref 0.5–1)
GFR AFRICAN AMERICAN: 54
GFR NON-AFRICAN AMERICAN: 45 ML/MIN/1.73
GLUCOSE BLD-MCNC: 129 MG/DL (ref 74–99)
HBA1C MFR BLD: 5.9 % (ref 4–5.6)
HDLC SERPL-MCNC: 45 MG/DL
LDL CHOLESTEROL CALCULATED: 67 MG/DL (ref 0–99)
POTASSIUM SERPL-SCNC: 4.3 MMOL/L (ref 3.5–5)
SODIUM BLD-SCNC: 140 MMOL/L (ref 132–146)
TOTAL PROTEIN: 7.3 G/DL (ref 6.4–8.3)
TRIGL SERPL-MCNC: 39 MG/DL (ref 0–149)
VLDLC SERPL CALC-MCNC: 8 MG/DL

## 2022-01-31 PROCEDURE — 83036 HEMOGLOBIN GLYCOSYLATED A1C: CPT

## 2022-01-31 PROCEDURE — 80061 LIPID PANEL: CPT

## 2022-01-31 PROCEDURE — 36415 COLL VENOUS BLD VENIPUNCTURE: CPT

## 2022-01-31 PROCEDURE — 80053 COMPREHEN METABOLIC PANEL: CPT

## 2022-02-04 ENCOUNTER — TELEPHONE (OUTPATIENT)
Dept: CARDIAC CATH/INVASIVE PROCEDURES | Age: 67
End: 2022-02-04

## 2022-02-04 NOTE — TELEPHONE ENCOUNTER
Reminded patient of scheduled procedure on 2/7  Instructions given and COVID questionnaire completed.

## 2022-02-07 ENCOUNTER — HOSPITAL ENCOUNTER (OUTPATIENT)
Dept: CARDIAC CATH/INVASIVE PROCEDURES | Age: 67
Discharge: HOME | End: 2022-02-07
Payer: MEDICARE

## 2022-02-07 ENCOUNTER — ANESTHESIA (OUTPATIENT)
Dept: CARDIAC CATH/INVASIVE PROCEDURES | Age: 67
End: 2022-02-07

## 2022-02-07 ENCOUNTER — ANESTHESIA EVENT (OUTPATIENT)
Dept: CARDIAC CATH/INVASIVE PROCEDURES | Age: 67
End: 2022-02-07

## 2022-02-07 VITALS
HEART RATE: 59 BPM | BODY MASS INDEX: 31.65 KG/M2 | OXYGEN SATURATION: 100 % | SYSTOLIC BLOOD PRESSURE: 90 MMHG | DIASTOLIC BLOOD PRESSURE: 49 MMHG | TEMPERATURE: 98 F | WEIGHT: 190 LBS | RESPIRATION RATE: 20 BRPM | HEIGHT: 65 IN

## 2022-02-07 VITALS
RESPIRATION RATE: 16 BRPM | DIASTOLIC BLOOD PRESSURE: 76 MMHG | OXYGEN SATURATION: 100 % | SYSTOLIC BLOOD PRESSURE: 102 MMHG

## 2022-02-07 PROCEDURE — 6360000002 HC RX W HCPCS

## 2022-02-07 PROCEDURE — 2580000003 HC RX 258

## 2022-02-07 PROCEDURE — 2709999900 HC NON-CHARGEABLE SUPPLY

## 2022-02-07 PROCEDURE — 92960 CARDIOVERSION ELECTRIC EXT: CPT

## 2022-02-07 PROCEDURE — 3700000000 HC ANESTHESIA ATTENDED CARE

## 2022-02-07 RX ORDER — PROPOFOL 10 MG/ML
INJECTION, EMULSION INTRAVENOUS PRN
Status: DISCONTINUED | OUTPATIENT
Start: 2022-02-07 | End: 2022-02-07 | Stop reason: SDUPTHER

## 2022-02-07 RX ORDER — SODIUM CHLORIDE 9 MG/ML
INJECTION, SOLUTION INTRAVENOUS CONTINUOUS PRN
Status: DISCONTINUED | OUTPATIENT
Start: 2022-02-07 | End: 2022-02-07 | Stop reason: SDUPTHER

## 2022-02-07 RX ADMIN — PROPOFOL 70 MG: 10 INJECTION, EMULSION INTRAVENOUS at 14:01

## 2022-02-07 RX ADMIN — SODIUM CHLORIDE: 9 INJECTION, SOLUTION INTRAVENOUS at 13:58

## 2022-02-07 ASSESSMENT — ENCOUNTER SYMPTOMS: SHORTNESS OF BREATH: 1

## 2022-02-07 NOTE — ANESTHESIA POSTPROCEDURE EVALUATION
Department of Anesthesiology  Postprocedure Note    Patient: Santa Bills  MRN: [id number]  YOB: 1955  Date of evaluation: 2/7/2022  Time:  3:03 PM     Procedure Summary     Date: 02/07/22 Room / Location: [facility] LAB    Anesthesia Start: 7337 Anesthesia Stop: 5255    Procedure: CARDIOVERSION WITH ANESTHESIA Diagnosis: Other persistent atrial fibrillation    Scheduled Providers:  Responsible Provider: Poppy Rock MD    Anesthesia Type: MAC ASA Status: 3          Anesthesia Type: MAC    Bryan Phase I:      Bryan Phase II:      Last vitals: Reviewed and per EMR flowsheets.        Anesthesia Post Evaluation    Patient location during evaluation: PACU  Patient participation: complete - patient participated  Level of consciousness: awake  Pain score: 0  Airway patency: patent  Nausea & Vomiting: no nausea  Complications: no  Cardiovascular status: hemodynamically stable  Respiratory status: acceptable  Hydration status: stable

## 2022-02-07 NOTE — PROCEDURES
Patient previous informed the risk discomforts benefits alternatives of electrical cardioversion in the office last week and I reviewed the same with her today and all questions were answered and she agreed to proceed. She presented in atrial fibrillation heart rate in the 90s. Anesthesia provided sedation. Patient was comfortably sedated and 200 J of synchronized electricity was delivered and she converted to normal sinus rhythm heart rate in the 50s and 60s    She awakened without hemodynamic or arrhythmic instability. Patient instructed to continue the current medical regimen. To be seen back at [address] [place] [address] cardiology 1 month.

## 2022-02-07 NOTE — ANESTHESIA PRE PROCEDURE
Department of Anesthesiology  Preprocedure Note       Name:  Daniel Yeh   Age:  69 y.o.  :  1955                                          MRN:  [id number]         Date:  2022      Surgeon: Gino Park  Procedure:  DCCV    Medications prior to admission:   Prior to Admission medications    Medication Sig Start Date End Date Taking? Authorizing Provider   rivaroxaban (XARELTO) 15 MG TABS tablet Take 1 tablet by mouth daily  Patient taking differently: Take 20 mg by mouth daily  21   Lu Aranda MD   lactobacillus (CULTURELLE) CAPS capsule Take 1 capsule by mouth daily 21   Lu Aranda MD   metoprolol succinate (TOPROL XL) 50 MG extended release tablet Take 3 tablets by mouth 2 times daily  Patient taking differently: Take 200 mg by mouth 2 times daily  21   Lu Aranda MD   digoxin [facility]) 125 MCG tablet Take 1 tablet by mouth daily 21   Lu Aranda MD   losartan-hydrochlorothiazide [facility]) 100-25 MG per tablet Take 1 tablet by mouth nightly  18   Historical Provider, MD   amLODIPine (NORVASC) 5 MG tablet Inhale into the lungs 16  Historical Provider, MD       Current medications:    Current Outpatient Medications   Medication Sig Dispense Refill    rivaroxaban (XARELTO) 15 MG TABS tablet Take 1 tablet by mouth daily (Patient taking differently: Take 20 mg by mouth daily ) 42 tablet 0    lactobacillus (CULTURELLE) CAPS capsule Take 1 capsule by mouth daily 30 capsule 0    metoprolol succinate (TOPROL XL) 50 MG extended release tablet Take 3 tablets by mouth 2 times daily (Patient taking differently: Take 200 mg by mouth 2 times daily ) 30 tablet 3    digoxin (LANOXIN) 125 MCG tablet Take 1 tablet by mouth daily 30 tablet 3    losartan-hydrochlorothiazide (HYZAAR) 100-25 MG per tablet Take 1 tablet by mouth nightly        No current facility-administered medications for this visit.        Allergies:  No Known Allergies    Problem List:    Patient Active Problem List Diagnosis Code    Abnormal EKG R94.31    Chest pain R07.9    Dyspnea R06.00    Nausea R11.0    GERD (gastroesophageal reflux disease) K21.9    Obesity E66.9    Atrial fibrillation with RVR (HCC) I48.91    Multiple sclerosis (HCC) G35    Acute renal failure (ARF) (HCC) N17.9       Past Medical History:        Diagnosis Date    A-fib ([facility] 02/03/2021    Diabetes mellitus ([facility]     HTN (hypertension)     [facility]        Past Surgical History:        Procedure Laterality Date    CARDIOVERSION  03/08/2021    DR. HINSON SUCCESFUL TO SR, ONE SHOCK 200 JOULES    CARDIOVERSION  10/13/2021    Dr Judah Kirk Bilateral 2014,2017       Social History:    Social History     Tobacco Use    Smoking status: Never Smoker    Smokeless tobacco: Never Used   Substance Use Topics    Alcohol use: Not Currently                                Counseling given: Not Answered      Vital Signs (Current): There were no vitals filed for this visit.                                            BP Readings from Last 3 Encounters:   10/13/21 (!) 98/58   10/13/21 125/73   06/30/21 130/81       NPO Status:   > 8hrs                                                                               BMI:   Wt Readings from Last 3 Encounters:   06/30/21 204 lb (92.5 kg)   03/08/21 216 lb (98 kg)   02/24/21 216 lb (98 kg)     There is no height or weight on file to calculate BMI.    CBC:   Lab Results   Component Value Date    WBC 10.7 12/21/2021    RBC 3.76 12/21/2021    HGB 10.9 12/21/2021    HCT 33.4 12/21/2021    MCV 88.8 12/21/2021    RDW 14.2 12/21/2021     12/21/2021       CMP:   Lab Results   Component Value Date     01/31/2022    K 4.3 01/31/2022    K 3.9 04/15/2020     01/31/2022    CO2 25 01/31/2022    BUN 27 01/31/2022    CREATININE 1.2 01/31/2022    GFRAA 54 01/31/2022    LABGLOM 45 01/31/2022    GLUCOSE 129 01/31/2022    PROT 7.3 01/31/2022    CALCIUM 9.6 01/31/2022    BILITOT 0.8 01/31/2022    ALKPHOS 89 01/31/2022    AST 17 01/31/2022    ALT 15 01/31/2022       POC Tests: No results for input(s): POCGLU, POCNA, POCK, POCCL, POCBUN, POCHEMO, POCHCT in the last 72 hours. Coags:   Lab Results   Component Value Date    PROTIME 21.3 12/21/2021    INR 2.0 12/21/2021       HCG (If Applicable): No results found for: PREGTESTUR, PREGSERUM, HCG, HCGQUANT     ABGs: No results found for: PHART, PO2ART, NOT1EMR, XUW5TLN, BEART, L7UEJLES     Type & Screen (If Applicable):  No results found for: LABABO, LABRH    Drug/Infectious Status (If Applicable):  No results found for: HIV, HEPCAB    COVID-19 Screening (If Applicable):   Lab Results   Component Value Date    COVID19 Not Detected 03/02/2021         Anesthesia Evaluation  Patient summary reviewed and Nursing notes reviewed no history of anesthetic complications:   Airway: Mallampati: II  TM distance: >3 FB   Neck ROM: full  Mouth opening: > = 3 FB Dental:          Pulmonary:   (+) shortness of breath:  decreased breath sounds,                             Cardiovascular:  Exercise tolerance: poor (<4 METS),   (+) hypertension:, dysrhythmias: atrial fibrillation,         Rhythm: irregular  Rate: normal                    Neuro/Psych:               GI/Hepatic/Renal:   (+) GERD: well controlled,           Endo/Other:    (+) DiabetesType II DM, , blood dyscrasia (Xarelto ): anticoagulation therapy:., .                 Abdominal:             Vascular: Other Findings:               Anesthesia Plan      MAC     ASA 3       Induction: intravenous. Anesthetic plan and risks discussed with patient. Plan discussed with attending.                   JUDY Fernando - CRNA   2/7/2022

## 2022-02-11 LAB
EKG ATRIAL RATE: 95 BPM
EKG P AXIS: 92 DEGREES
EKG P-R INTERVAL: 266 MS
EKG Q-T INTERVAL: 352 MS
EKG QRS DURATION: 86 MS
EKG QTC CALCULATION (BAZETT): 442 MS
EKG R AXIS: 25 DEGREES
EKG T AXIS: 21 DEGREES
EKG VENTRICULAR RATE: 95 BPM

## 2022-03-07 ENCOUNTER — HOSPITAL ENCOUNTER (EMERGENCY)
Age: 67
Discharge: HOME | End: 2022-03-08
Attending: STUDENT IN AN ORGANIZED HEALTH CARE EDUCATION/TRAINING PROGRAM
Payer: MEDICARE

## 2022-03-07 ENCOUNTER — APPOINTMENT (OUTPATIENT)
Dept: GENERAL RADIOLOGY | Age: 67
End: 2022-03-07
Payer: MEDICARE

## 2022-03-07 VITALS
HEART RATE: 66 BPM | SYSTOLIC BLOOD PRESSURE: 123 MMHG | BODY MASS INDEX: 31.62 KG/M2 | RESPIRATION RATE: 22 BRPM | WEIGHT: 190 LBS | DIASTOLIC BLOOD PRESSURE: 73 MMHG | OXYGEN SATURATION: 96 % | TEMPERATURE: 97.6 F

## 2022-03-07 DIAGNOSIS — Z01.89 ENCOUNTER FOR CARDIOVERSION PROCEDURE: ICD-10-CM

## 2022-03-07 DIAGNOSIS — I48.91 ATRIAL FIBRILLATION WITH RVR (HCC): Primary | ICD-10-CM

## 2022-03-07 LAB
ALBUMIN SERPL-MCNC: 4 G/DL (ref 3.5–5.2)
ALP BLD-CCNC: 91 U/L (ref 35–104)
ALT SERPL-CCNC: 29 U/L (ref 0–32)
ANION GAP SERPL CALCULATED.3IONS-SCNC: 14 MMOL/L (ref 7–16)
ANION GAP SERPL CALCULATED.3IONS-SCNC: 17 MMOL/L (ref 7–16)
ANISOCYTOSIS: ABNORMAL
AST SERPL-CCNC: 27 U/L (ref 0–31)
BASOPHILS ABSOLUTE: 0 E9/L (ref 0–0.2)
BASOPHILS ABSOLUTE: 0.06 E9/L (ref 0–0.2)
BASOPHILS RELATIVE PERCENT: 0.4 % (ref 0–2)
BASOPHILS RELATIVE PERCENT: 0.6 % (ref 0–2)
BILIRUB SERPL-MCNC: 0.5 MG/DL (ref 0–1.2)
BLASTS RELATIVE PERCENT: 2.6 % (ref 0–0)
BUN BLDV-MCNC: 47 MG/DL (ref 6–23)
BUN BLDV-MCNC: 47 MG/DL (ref 6–23)
CALCIUM SERPL-MCNC: 9.3 MG/DL (ref 8.6–10.2)
CALCIUM SERPL-MCNC: 9.4 MG/DL (ref 8.6–10.2)
CHLORIDE BLD-SCNC: 99 MMOL/L (ref 98–107)
CHLORIDE BLD-SCNC: 99 MMOL/L (ref 98–107)
CO2: 25 MMOL/L (ref 22–29)
CO2: 26 MMOL/L (ref 22–29)
CREAT SERPL-MCNC: 1.7 MG/DL (ref 0.5–1)
CREAT SERPL-MCNC: 1.7 MG/DL (ref 0.5–1)
EOSINOPHILS ABSOLUTE: 0.1 E9/L (ref 0.05–0.5)
EOSINOPHILS ABSOLUTE: 0.1 E9/L (ref 0.05–0.5)
EOSINOPHILS RELATIVE PERCENT: 0.7 % (ref 0–6)
EOSINOPHILS RELATIVE PERCENT: 0.8 % (ref 0–6)
GFR AFRICAN AMERICAN: 36
GFR AFRICAN AMERICAN: 36
GFR NON-AFRICAN AMERICAN: 30 ML/MIN/1.73
GFR NON-AFRICAN AMERICAN: 30 ML/MIN/1.73
GLUCOSE BLD-MCNC: 113 MG/DL (ref 74–99)
GLUCOSE BLD-MCNC: 130 MG/DL (ref 74–99)
HCT VFR BLD CALC: 35.6 % (ref 34–48)
HCT VFR BLD CALC: 37 % (ref 34–48)
HEMOGLOBIN: 11.6 G/DL (ref 11.5–15.5)
HEMOGLOBIN: 11.9 G/DL (ref 11.5–15.5)
IMMATURE GRANULOCYTES #: 0.11 E9/L
IMMATURE GRANULOCYTES %: 0.8 % (ref 0–5)
INR BLD: 1.8
LYMPHOCYTES ABSOLUTE: 2.53 E9/L (ref 1.5–4)
LYMPHOCYTES ABSOLUTE: 3.05 E9/L (ref 1.5–4)
LYMPHOCYTES RELATIVE PERCENT: 18.5 % (ref 20–42)
LYMPHOCYTES RELATIVE PERCENT: 23.5 % (ref 20–42)
MCH RBC QN AUTO: 28.5 PG (ref 26–35)
MCH RBC QN AUTO: 28.6 PG (ref 26–35)
MCHC RBC AUTO-ENTMCNC: 32.2 % (ref 32–34.5)
MCHC RBC AUTO-ENTMCNC: 32.6 % (ref 32–34.5)
MCV RBC AUTO: 87.5 FL (ref 80–99.9)
MCV RBC AUTO: 88.9 FL (ref 80–99.9)
MONOCYTES ABSOLUTE: 1.02 E9/L (ref 0.1–0.95)
MONOCYTES ABSOLUTE: 1.47 E9/L (ref 0.1–0.95)
MONOCYTES RELATIVE PERCENT: 10.7 % (ref 2–12)
MONOCYTES RELATIVE PERCENT: 7.8 % (ref 2–12)
MYELOCYTE PERCENT: 0.9 % (ref 0–0)
NEUTROPHILS ABSOLUTE: 8.13 E9/L (ref 1.8–7.3)
NEUTROPHILS ABSOLUTE: 9.41 E9/L (ref 1.8–7.3)
NEUTROPHILS RELATIVE PERCENT: 63.5 % (ref 43–80)
NEUTROPHILS RELATIVE PERCENT: 68.9 % (ref 43–80)
PDW BLD-RTO: 14.3 FL (ref 11.5–15)
PDW BLD-RTO: 14.3 FL (ref 11.5–15)
PLATELET # BLD: 273 E9/L (ref 130–450)
PLATELET # BLD: 300 E9/L (ref 130–450)
PMV BLD AUTO: 10.2 FL (ref 7–12)
PMV BLD AUTO: 10.5 FL (ref 7–12)
POLYCHROMASIA: ABNORMAL
POTASSIUM REFLEX MAGNESIUM: 3.7 MMOL/L (ref 3.5–5)
POTASSIUM REFLEX MAGNESIUM: 3.9 MMOL/L (ref 3.5–5)
PROMYELOCYTES PERCENT: 0.9 % (ref 0–0)
PROTHROMBIN TIME: 19.8 SEC (ref 9.3–12.4)
RBC # BLD: 4.07 E12/L (ref 3.5–5.5)
RBC # BLD: 4.16 E12/L (ref 3.5–5.5)
SARS-COV-2, NAAT: NOT DETECTED
SODIUM BLD-SCNC: 139 MMOL/L (ref 132–146)
SODIUM BLD-SCNC: 141 MMOL/L (ref 132–146)
TOTAL PROTEIN: 8 G/DL (ref 6.4–8.3)
TROPONIN, HIGH SENSITIVITY: 24 NG/L (ref 0–9)
TROPONIN, HIGH SENSITIVITY: 24 NG/L (ref 0–9)
WBC # BLD: 12.7 E9/L (ref 4.5–11.5)
WBC # BLD: 13.7 E9/L (ref 4.5–11.5)

## 2022-03-07 PROCEDURE — 93005 ELECTROCARDIOGRAM TRACING: CPT | Performed by: STUDENT IN AN ORGANIZED HEALTH CARE EDUCATION/TRAINING PROGRAM

## 2022-03-07 PROCEDURE — 93005 ELECTROCARDIOGRAM TRACING: CPT | Performed by: PHYSICIAN ASSISTANT

## 2022-03-07 PROCEDURE — 92960 CARDIOVERSION ELECTRIC EXT: CPT

## 2022-03-07 PROCEDURE — 71046 X-RAY EXAM CHEST 2 VIEWS: CPT

## 2022-03-07 PROCEDURE — 80053 COMPREHEN METABOLIC PANEL: CPT

## 2022-03-07 PROCEDURE — 85610 PROTHROMBIN TIME: CPT

## 2022-03-07 PROCEDURE — 84484 ASSAY OF TROPONIN QUANT: CPT

## 2022-03-07 PROCEDURE — 2580000003 HC RX 258: Performed by: STUDENT IN AN ORGANIZED HEALTH CARE EDUCATION/TRAINING PROGRAM

## 2022-03-07 PROCEDURE — 96365 THER/PROPH/DIAG IV INF INIT: CPT

## 2022-03-07 PROCEDURE — 99285 EMERGENCY DEPT VISIT HI MDM: CPT

## 2022-03-07 PROCEDURE — 80048 BASIC METABOLIC PNL TOTAL CA: CPT

## 2022-03-07 PROCEDURE — 2500000003 HC RX 250 WO HCPCS: Performed by: STUDENT IN AN ORGANIZED HEALTH CARE EDUCATION/TRAINING PROGRAM

## 2022-03-07 PROCEDURE — 96375 TX/PRO/DX INJ NEW DRUG ADDON: CPT

## 2022-03-07 PROCEDURE — 6360000002 HC RX W HCPCS: Performed by: STUDENT IN AN ORGANIZED HEALTH CARE EDUCATION/TRAINING PROGRAM

## 2022-03-07 PROCEDURE — 87635 SARS-COV-2 COVID-19 AMP PRB: CPT

## 2022-03-07 PROCEDURE — 36415 COLL VENOUS BLD VENIPUNCTURE: CPT

## 2022-03-07 PROCEDURE — 85025 COMPLETE CBC W/AUTO DIFF WBC: CPT

## 2022-03-07 RX ORDER — METOPROLOL TARTRATE 5 MG/5ML
5 INJECTION INTRAVENOUS
Status: COMPLETED | OUTPATIENT
Start: 2022-03-07 | End: 2022-03-07

## 2022-03-07 RX ORDER — MAGNESIUM SULFATE IN WATER 40 MG/ML
4000 INJECTION, SOLUTION INTRAVENOUS ONCE
Status: COMPLETED | OUTPATIENT
Start: 2022-03-07 | End: 2022-03-07

## 2022-03-07 RX ORDER — MIDAZOLAM HYDROCHLORIDE 2 MG/2ML
1 INJECTION, SOLUTION INTRAMUSCULAR; INTRAVENOUS ONCE
Status: COMPLETED | OUTPATIENT
Start: 2022-03-07 | End: 2022-03-07

## 2022-03-07 RX ORDER — METOPROLOL SUCCINATE 25 MG/1
25 TABLET, EXTENDED RELEASE ORAL ONCE
Status: DISCONTINUED | OUTPATIENT
Start: 2022-03-07 | End: 2022-03-08 | Stop reason: HOSPADM

## 2022-03-07 RX ORDER — 0.9 % SODIUM CHLORIDE 0.9 %
1000 INTRAVENOUS SOLUTION INTRAVENOUS ONCE
Status: COMPLETED | OUTPATIENT
Start: 2022-03-07 | End: 2022-03-08

## 2022-03-07 RX ORDER — ETOMIDATE 2 MG/ML
10 INJECTION INTRAVENOUS ONCE
Status: COMPLETED | OUTPATIENT
Start: 2022-03-07 | End: 2022-03-07

## 2022-03-07 RX ADMIN — METOPROLOL TARTRATE 5 MG: 5 INJECTION, SOLUTION INTRAVENOUS at 20:53

## 2022-03-07 RX ADMIN — SODIUM CHLORIDE 1000 ML: 9 INJECTION, SOLUTION INTRAVENOUS at 23:04

## 2022-03-07 RX ADMIN — METOPROLOL TARTRATE 5 MG: 5 INJECTION, SOLUTION INTRAVENOUS at 20:40

## 2022-03-07 RX ADMIN — METOPROLOL TARTRATE 5 MG: 5 INJECTION, SOLUTION INTRAVENOUS at 20:45

## 2022-03-07 RX ADMIN — MIDAZOLAM HYDROCHLORIDE 1 MG: 1 INJECTION, SOLUTION INTRAMUSCULAR; INTRAVENOUS at 23:45

## 2022-03-07 RX ADMIN — ETOMIDATE 10 MG: 2 INJECTION, SOLUTION INTRAVENOUS at 23:44

## 2022-03-07 RX ADMIN — MAGNESIUM SULFATE HEPTAHYDRATE 4000 MG: 40 INJECTION, SOLUTION INTRAVENOUS at 20:34

## 2022-03-07 ASSESSMENT — ENCOUNTER SYMPTOMS
NAUSEA: 0
SINUS PRESSURE: 0
DIARRHEA: 0
BACK PAIN: 0
VOMITING: 0
COUGH: 0
ABDOMINAL PAIN: 0
WHEEZING: 0
SHORTNESS OF BREATH: 1
EYE DISCHARGE: 0
SORE THROAT: 0
EYE PAIN: 0

## 2022-03-07 ASSESSMENT — PAIN SCALES - GENERAL: PAINLEVEL_OUTOF10: 0

## 2022-03-07 NOTE — ED NOTES
Department of Emergency Medicine  FIRST PROVIDER TRIAGE NOTE          3/7/22  2:55 PM EST    Date of Encounter: No admission date for patient encounter. MRN: [id number]      HPI: Shazia King is a 69 y.o. female who presents to the ED for Atrial Fibrillation (hr ) and Shortness of Breath  Patient mitts to associated symptoms of nausea without emesis as well as diarrhea. Patient states that she has a cardio mobile which alerted her that she was in A. fib. Patient states that she believes she went into A. fib last night as far as when she started to experience the palpitations and shortness of breath    ROS: Negative for cp, abd pain, back pain, fever or cough. PE: Gen Appearance/Constitutional: alert  CV: irregular rate  Pulm: CTA bilat  GI: soft and NT     Initial Plan of Care: All treatment areas with department are currently occupied. Plan to order/Initiate the following while awaiting opening in ED: labs, EKG and imaging studies. Labs:  No results found for this visit on 03/07/22. Imaging: All Radiology results interpreted by Radiologist unless otherwise noted.   No orders to display     ED Course    Medications - No data to display     -------------------------  Patient brought to treatment area for further evaluation  Electronically signed by HENRY Boucher   DD: 3/7/22       Abdiaziz Erwin, [id number]  03/07/22 [id number]

## 2022-03-08 NOTE — ED NOTES
Pt has remained in NSR since cardioversion.   called and is on the way     Sariah Morales, RN  03/08/22 [id number]

## 2022-03-08 NOTE — ED PROVIDER NOTES
78-year-old female presenting emerged from for A. fib RVR, shortness of breath, ongoing for the last few weeks, will schedule to see her cardiologist about this is has been in on and off issues, states she was cardioverted in February. Symptoms moderate in severity, gradual onset, persistent, worse with exertion, nothing make them better, have been associated palpitations with the shortness of breath. Review of Systems   Constitutional: Positive for fatigue. Negative for chills and fever. HENT: Negative for ear pain, sinus pressure and sore throat. Eyes: Negative for pain and discharge. Respiratory: Positive for shortness of breath. Negative for cough and wheezing. Cardiovascular: Positive for palpitations. Negative for chest pain. Gastrointestinal: Negative for abdominal pain, diarrhea, nausea and vomiting. Genitourinary: Negative for dysuria and frequency. Musculoskeletal: Negative for arthralgias and back pain. Skin: Negative for rash and wound. Neurological: Negative for weakness and headaches. Hematological: Negative for adenopathy. All other systems reviewed and are negative. Physical Exam  Vitals and nursing note reviewed. Constitutional:       Appearance: Normal appearance. HENT:      Head: Normocephalic and atraumatic. Right Ear: External ear normal.      Left Ear: External ear normal.      Nose: Nose normal.      Mouth/Throat:      Mouth: Mucous membranes are moist.   Eyes:      Extraocular Movements: Extraocular movements intact. Pupils: Pupils are equal, round, and reactive to light. Cardiovascular:      Rate and Rhythm: Tachycardia present. Rhythm irregular. Pulses: Normal pulses. Heart sounds: Normal heart sounds. Pulmonary:      Effort: Pulmonary effort is normal.      Breath sounds: Normal breath sounds. Abdominal:      General: Abdomen is flat. Bowel sounds are normal.      Palpations: Abdomen is soft.    Musculoskeletal: General: Normal range of motion. Cervical back: Normal range of motion and neck supple. Skin:     General: Skin is warm and dry. Neurological:      Mental Status: She is alert. Procedures     MDM     ED Course as of 03/08/22 0016   Mon Mar 07, 2022   2225 Improved heart rate, but patient's blood pressure is borderline, I will give fluids [JG]   2230 EKG: This EKG is signed by emergency department physician. Rate: 147  Rhythm: Atrial fibrillation  Interpretation: non-specific EKG  Comparison: changes compared to previous EKG      [JG]   Tue Mar 08, 2022   0007 -------------------------------- Conscious Sedation Procedure Note -----------------------------  Patient Name: Lilliana Rubio   Medical Record Number: [id number]  Date: 3/8/22   Time: 12:08 AM EST   Room/Bed: [unfilled]    Indication: cardioversion  Consent: I have discussed with the patient and/or the patient representative the indication, alternatives, and the possible risks and/or complications of the planned procedure and the anesthesia methods. The patient and/or patient representative appear to understand and agree to proceed. Physician Involvement: The attending physician was present and supervising this procedure. 3/8/22     Time: 2344 (pre-procedure start time)  Pre-Sedation Documentation and Exam: I have personally completed a history, physical exam & review of systems for this patient (see notes). Airway Assessment: normal  Prior History of Anesthesia Complications: none  ASA Classification: Class 2 - A normal healthy patient with mild systemic disease  Sedation/ Anesthesia Plan: intravenous sedation  Medications Used: etomidate intravenously and midazolam (Versed) 1.0 mg intravenously  Monitoring and Safety: The patient was placed on a cardiac monitor and vital signs, pulse oximetry and level of consciousness were continuously evaluated throughout the procedure.  The patient was closely monitored until recovery from the medications was complete and the patient had returned to baseline status. Respiratory therapy was on standby at all times during the procedure.    ----------------------------------- Post Conscious Sedation Note -----------------------------------    3/8/22     Time: 1204 (post-procedure stop time)  Post-Sedation Vital Signs: Vital signs were reviewed and were stable after the procedure (see flow sheet for vitals)       Post-Sedation Exam: Lungs: clear       Complications: none    Electronically Signed by: Butch Lopez MD         [JG]   1852 Cardioversion Procedure Note    Indication: atrial fibrillation    Consent: The patient was counseled regarding the procedure, its indications, risks, potential complications and alternatives, and any questions were answered. Consent was obtained to proceed. Pre-Medication: etomidate intravenously and midazolam (Versed) 1.0 mg intravenously    Procedure: The patient was placed in the supine position and the chest area was exposed. The cardioversion pads were applied in the standard manner and configuration. Attempt #1: The defibrillator was set on the synchronous mode and charged to 200 joules. A charge was then delivered which resulted in conversion to normal sinus rhythm. Attempt #2: The defibrillator was set on the synchronous and charged to 360 joules. A charge was then delivered which resulted in  conversion to normal sinus rhythm. Attempt #3: Not necessary    The patient tolerated the procedure well. Complications: None         [JG]   0009 Patient presented to the emergency department for shortness of breath, A. fib RVR, patient has had been having this issue ongoing for the past few weeks, states it has been on and off, scheduled follow-up the cardiologist, is required cardioversion before in the past.  States she is compliant with her Xarelto medication for her A. fib RVR.   She was given Lopressor, with some improvement, however she later became more tachycardic, she was then offered electrical versus chemical cardioversion, she elected for electrical.  Procedural sedation was performed, she was shocked initially at 200 J with improvement back in a normal sinus rhythm, however she spontaneous converted back to A. fib RVR, was then shocked again at 360 J. She was monitored in the emergency department until sedation wore off, she was discharged home instructed follow-up cardiologist.   [JG]   0011 EKG: This EKG is signed by emergency department physician. Rate: 65  Rhythm: Sinus  Interpretation: no acute changes  Comparison: changes compared to previous EKG      [JG]      ED Course User Index  [JG] Dorcas Mohr MD      Patient presented to the emergency department for shortness of breath, A. fib RVR, patient has had been having this issue ongoing for the past few weeks, states it has been on and off, scheduled follow-up the cardiologist, is required cardioversion before in the past.  States she is compliant with her Xarelto medication for her A. fib RVR. She was given Lopressor, with some improvement, however she later became more tachycardic, she was then offered electrical versus chemical cardioversion, she elected for electrical.  Procedural sedation was performed, she was shocked initially at 200 J with improvement back in a normal sinus rhythm, however she spontaneous converted back to A. fib RVR, was then shocked again at 360 J. She was monitored in the emergency department until sedation wore off, she was discharged home instructed follow-up cardiologist.      ED Course as of 03/08/22 0017   Mon Mar 07, 2022   2225 Improved heart rate, but patient's blood pressure is borderline, I will give fluids [JG]   2230 EKG: This EKG is signed by emergency department physician.     Rate: 147  Rhythm: Atrial fibrillation  Interpretation: non-specific EKG  Comparison: changes compared to previous EKG      [JG]   Tue Mar 08, 2022   0007 -------------------------------- Conscious Sedation Procedure Note -----------------------------  Patient Name: Ajith Otoole   Medical Record Number: [id number]  Date: 3/8/22   Time: 12:08 AM EST   Room/Bed: [unfilled]    Indication: cardioversion  Consent: I have discussed with the patient and/or the patient representative the indication, alternatives, and the possible risks and/or complications of the planned procedure and the anesthesia methods. The patient and/or patient representative appear to understand and agree to proceed. Physician Involvement: The attending physician was present and supervising this procedure. 3/8/22     Time: 2344 (pre-procedure start time)  Pre-Sedation Documentation and Exam: I have personally completed a history, physical exam & review of systems for this patient (see notes). Airway Assessment: normal  Prior History of Anesthesia Complications: none  ASA Classification: Class 2 - A normal healthy patient with mild systemic disease  Sedation/ Anesthesia Plan: intravenous sedation  Medications Used: etomidate intravenously and midazolam (Versed) 1.0 mg intravenously  Monitoring and Safety: The patient was placed on a cardiac monitor and vital signs, pulse oximetry and level of consciousness were continuously evaluated throughout the procedure. The patient was closely monitored until recovery from the medications was complete and the patient had returned to baseline status.  Respiratory therapy was on standby at all times during the procedure.    ----------------------------------- Post Conscious Sedation Note -----------------------------------    3/8/22     Time: 1204 (post-procedure stop time)  Post-Sedation Vital Signs: Vital signs were reviewed and were stable after the procedure (see flow sheet for vitals)       Post-Sedation Exam: Lungs: clear       Complications: none    Electronically Signed by: Any Martinez MD         [JG]   2896 Cardioversion Procedure Note    Indication: atrial fibrillation    Consent: The patient was counseled regarding the procedure, its indications, risks, potential complications and alternatives, and any questions were answered. Consent was obtained to proceed. Pre-Medication: etomidate intravenously and midazolam (Versed) 1.0 mg intravenously    Procedure: The patient was placed in the supine position and the chest area was exposed. The cardioversion pads were applied in the standard manner and configuration. Attempt #1: The defibrillator was set on the synchronous mode and charged to 200 joules. A charge was then delivered which resulted in conversion to normal sinus rhythm. Attempt #2: The defibrillator was set on the synchronous and charged to 360 joules. A charge was then delivered which resulted in  conversion to normal sinus rhythm. Attempt #3: Not necessary    The patient tolerated the procedure well. Complications: None         [JG]   0009 Patient presented to the emergency department for shortness of breath, A. fib RVR, patient has had been having this issue ongoing for the past few weeks, states it has been on and off, scheduled follow-up the cardiologist, is required cardioversion before in the past.  States she is compliant with her Xarelto medication for her A. fib RVR. She was given Lopressor, with some improvement, however she later became more tachycardic, she was then offered electrical versus chemical cardioversion, she elected for electrical.  Procedural sedation was performed, she was shocked initially at 200 J with improvement back in a normal sinus rhythm, however she spontaneous converted back to A. fib RVR, was then shocked again at 360 J. She was monitored in the emergency department until sedation wore off, she was discharged home instructed follow-up cardiologist.   [JG]   0011 EKG: This EKG is signed by emergency department physician.     Rate: 65  Rhythm: Sinus  Interpretation: no acute changes  Comparison: changes compared to previous EKG      [JG]      ED Course User Index  [JG] Beryle Ditty, MD       --------------------------------------------- PAST HISTORY ---------------------------------------------  Past Medical History:  has a past medical history of A-fib ([facility] [address] Diabetes mellitus ([facility] [address] HTN (hypertension), and [facility]) [facility] Past Surgical History:  has a past surgical history that includes  section (); joint replacement (Bilateral, [id number]); Cholecystectomy; Cardioversion (2021); and Cardioversion (10/13/2021). Social History:  reports that she has never smoked. She has never used smokeless tobacco. She reports previous alcohol use. She reports previous drug use. Family History: family history includes Atrial Fibrillation in her father; Stroke in her mother. The patients home medications have been reviewed. Allergies: Patient has no known allergies.     -------------------------------------------------- RESULTS -------------------------------------------------  Labs:  Results for orders placed or performed during the hospital encounter of 22   COVID-19, Rapid    Specimen: Nasopharyngeal Swab   Result Value Ref Range    SARS-CoV-2, NAAT Not Detected Not Detected   CBC with Auto Differential   Result Value Ref Range    WBC 13.7 (H) 4.5 - 11.5 E9/L    RBC 4.16 3.50 - 5.50 E12/L    Hemoglobin 11.9 11.5 - 15.5 g/dL    Hematocrit 37.0 34.0 - 48.0 %    MCV 88.9 80.0 - 99.9 fL    MCH 28.6 26.0 - 35.0 pg    MCHC 32.2 32.0 - 34.5 %    RDW 14.3 11.5 - 15.0 fL    Platelets 509 952 - 640 E9/L    MPV 10.5 7.0 - 12.0 fL    Neutrophils % 68.9 43.0 - 80.0 %    Immature Granulocytes % 0.8 0.0 - 5.0 %    Lymphocytes % 18.5 (L) 20.0 - 42.0 %    Monocytes % 10.7 2.0 - 12.0 %    Eosinophils % 0.7 0.0 - 6.0 %    Basophils % 0.4 0.0 - 2.0 %    Neutrophils Absolute 9.41 (H) 1.80 - 7.30 E9/L    Immature Granulocytes # 0.11 E9/L    Lymphocytes Absolute 2.53 1.50 - 4.00 E9/L Monocytes Absolute 1.47 (H) 0.10 - 0.95 E9/L    Eosinophils Absolute 0.10 0.05 - 0.50 E9/L    Basophils Absolute 0.06 0.00 - 0.20 E9/L   Comprehensive Metabolic Panel w/ Reflex to MG   Result Value Ref Range    Sodium 139 132 - 146 mmol/L    Potassium reflex Magnesium 3.9 3.5 - 5.0 mmol/L    Chloride 99 98 - 107 mmol/L    CO2 26 22 - 29 mmol/L    Anion Gap 14 7 - 16 mmol/L    Glucose 130 (H) 74 - 99 mg/dL    BUN 47 (H) 6 - 23 mg/dL    CREATININE 1.7 (H) 0.5 - 1.0 mg/dL    GFR Non-African American 30 >=60 mL/min/1.73    GFR African American 36     Calcium 9.3 8.6 - 10.2 mg/dL    Total Protein 8.0 6.4 - 8.3 g/dL    Albumin 4.0 3.5 - 5.2 g/dL    Total Bilirubin 0.5 0.0 - 1.2 mg/dL    Alkaline Phosphatase 91 35 - 104 U/L    ALT 29 0 - 32 U/L    AST 27 0 - 31 U/L   Troponin   Result Value Ref Range    Troponin, High Sensitivity 24 (H) 0 - 9 ng/L   Troponin   Result Value Ref Range    Troponin, High Sensitivity 24 (H) 0 - 9 ng/L   CBC with Auto Differential   Result Value Ref Range    WBC 12.7 (H) 4.5 - 11.5 E9/L    RBC 4.07 3.50 - 5.50 E12/L    Hemoglobin 11.6 11.5 - 15.5 g/dL    Hematocrit 35.6 34.0 - 48.0 %    MCV 87.5 80.0 - 99.9 fL    MCH 28.5 26.0 - 35.0 pg    MCHC 32.6 32.0 - 34.5 %    RDW 14.3 11.5 - 15.0 fL    Platelets 269 358 - 015 E9/L    MPV 10.2 7.0 - 12.0 fL    Neutrophils % 63.5 43.0 - 80.0 %    Lymphocytes % 23.5 20.0 - 42.0 %    Monocytes % 7.8 2.0 - 12.0 %    Eosinophils % 0.8 0.0 - 6.0 %    Basophils % 0.6 0.0 - 2.0 %    Neutrophils Absolute 8.13 (H) 1.80 - 7.30 E9/L    Lymphocytes Absolute 3.05 1.50 - 4.00 E9/L    Monocytes Absolute 1.02 (H) 0.10 - 0.95 E9/L    Eosinophils Absolute 0.10 0.05 - 0.50 E9/L    Basophils Absolute 0.00 0.00 - 0.20 E9/L    Myelocyte Percent 0.9 0 - 0 %    Promyelocytes Percent 0.9 0 - 0 %    Blasts Relative 2.6 0 - 0 %    Anisocytosis 1+     Polychromasia 1+    Basic Metabolic Panel w/ Reflex to MG   Result Value Ref Range    Sodium 141 132 - 146 mmol/L    Potassium reflex Magnesium 3.7 3.5 - 5.0 mmol/L    Chloride 99 98 - 107 mmol/L    CO2 25 22 - 29 mmol/L    Anion Gap 17 (H) 7 - 16 mmol/L    Glucose 113 (H) 74 - 99 mg/dL    BUN 47 (H) 6 - 23 mg/dL    CREATININE 1.7 (H) 0.5 - 1.0 mg/dL    GFR Non-African American 30 >=60 mL/min/1.73    GFR African American 36     Calcium 9.4 8.6 - 10.2 mg/dL   Protime-INR   Result Value Ref Range    Protime 19.8 (H) 9.3 - 12.4 sec    INR 1.8    EKG 12 Lead   Result Value Ref Range    Ventricular Rate 147 BPM    Atrial Rate 170 BPM    QRS Duration 76 ms    Q-T Interval 294 ms    QTc Calculation (Bazett) 460 ms    R Axis 68 degrees    T Axis -94 degrees       Radiology:  XR CHEST (2 VW)   Final Result   Normal chest             ------------------------- NURSING NOTES AND VITALS REVIEWED ---------------------------  Date / Time Roomed:  3/7/2022  7:20 PM  ED Bed Assignment:  17A/17A-17    The nursing notes within the ED encounter and vital signs as below have been reviewed. /73   Pulse 66   Temp 97.6 °F (36.4 °C)   Resp 22   Wt 190 lb (86.2 kg)   SpO2 96%   BMI 31.62 kg/m²   Oxygen Saturation Interpretation: Normal      ------------------------------------------ PROGRESS NOTES ------------------------------------------  12:17 AM EST  I have spoken with the patient and discussed todays results, in addition to providing specific details for the plan of care and counseling regarding the diagnosis and prognosis. Their questions are answered at this time and they are agreeable with the plan. I discussed at length with them reasons for immediate return here for re evaluation. They will followup with their Cardiologist and primary care physician by calling their office tomorrow. --------------------------------- ADDITIONAL PROVIDER NOTES ---------------------------------  At this time the patient is without objective evidence of an acute process requiring hospitalization or inpatient management.   They have remained hemodynamically stable throughout their entire ED visit and are stable for discharge with outpatient follow-up. The plan has been discussed in detail and they are aware of the specific conditions for emergent return, as well as the importance of follow-up. New Prescriptions    No medications on file     Please note that the withdrawal or failure to initiate urgent interventions for this patient would likely result in a life threatening deterioration or permanent disability. Systems at risk for deterioration include: Cardiovascular    Accordingly this patient received 36 minutes of critical care time, excluding separately billable procedures. Diagnosis:  1. Atrial fibrillation with RVR ([address]    2. Encounter for cardioversion procedure        Disposition:  Patient's disposition: Discharge to home  Patient's condition is stable.            Nabil Souza MD  Resident  03/08/22 [id number]

## 2022-03-10 LAB
EKG ATRIAL RATE: 170 BPM
EKG ATRIAL RATE: 65 BPM
EKG P AXIS: 82 DEGREES
EKG P-R INTERVAL: 160 MS
EKG Q-T INTERVAL: 294 MS
EKG Q-T INTERVAL: 396 MS
EKG QRS DURATION: 76 MS
EKG QRS DURATION: 84 MS
EKG QTC CALCULATION (BAZETT): 411 MS
EKG QTC CALCULATION (BAZETT): 460 MS
EKG R AXIS: 16 DEGREES
EKG R AXIS: 68 DEGREES
EKG T AXIS: -94 DEGREES
EKG T AXIS: 30 DEGREES
EKG VENTRICULAR RATE: 147 BPM
EKG VENTRICULAR RATE: 65 BPM

## 2022-03-23 ENCOUNTER — OFFICE VISIT (OUTPATIENT)
Dept: NEUROLOGY | Age: 67
End: 2022-03-23
Payer: MEDICARE

## 2022-03-23 VITALS
DIASTOLIC BLOOD PRESSURE: 95 MMHG | OXYGEN SATURATION: 99 % | TEMPERATURE: 97.8 F | HEIGHT: 65 IN | SYSTOLIC BLOOD PRESSURE: 137 MMHG | HEART RATE: 89 BPM | BODY MASS INDEX: 32.65 KG/M2 | RESPIRATION RATE: 18 BRPM | WEIGHT: 196 LBS

## 2022-03-23 DIAGNOSIS — M21.42 PES PLANUS OF BOTH FEET: ICD-10-CM

## 2022-03-23 DIAGNOSIS — M21.41 PES PLANUS OF BOTH FEET: ICD-10-CM

## 2022-03-23 DIAGNOSIS — G35 MULTIPLE SCLEROSIS (HCC): Primary | ICD-10-CM

## 2022-03-23 DIAGNOSIS — G56.03 BILATERAL CARPAL TUNNEL SYNDROME: ICD-10-CM

## 2022-03-23 PROCEDURE — G8484 FLU IMMUNIZE NO ADMIN: HCPCS | Performed by: PSYCHIATRY & NEUROLOGY

## 2022-03-23 PROCEDURE — 99215 OFFICE O/P EST HI 40 MIN: CPT | Performed by: PSYCHIATRY & NEUROLOGY

## 2022-03-23 PROCEDURE — 3017F COLORECTAL CA SCREEN DOC REV: CPT | Performed by: PSYCHIATRY & NEUROLOGY

## 2022-03-23 PROCEDURE — 1123F ACP DISCUSS/DSCN MKR DOCD: CPT | Performed by: PSYCHIATRY & NEUROLOGY

## 2022-03-23 PROCEDURE — G8427 DOCREV CUR MEDS BY ELIG CLIN: HCPCS | Performed by: PSYCHIATRY & NEUROLOGY

## 2022-03-23 PROCEDURE — 1036F TOBACCO NON-USER: CPT | Performed by: PSYCHIATRY & NEUROLOGY

## 2022-03-23 PROCEDURE — G8400 PT W/DXA NO RESULTS DOC: HCPCS | Performed by: PSYCHIATRY & NEUROLOGY

## 2022-03-23 PROCEDURE — 1090F PRES/ABSN URINE INCON ASSESS: CPT | Performed by: PSYCHIATRY & NEUROLOGY

## 2022-03-23 PROCEDURE — 4040F PNEUMOC VAC/ADMIN/RCVD: CPT | Performed by: PSYCHIATRY & NEUROLOGY

## 2022-03-23 PROCEDURE — G8417 CALC BMI ABV UP PARAM F/U: HCPCS | Performed by: PSYCHIATRY & NEUROLOGY

## 2022-03-23 RX ORDER — LOSARTAN POTASSIUM 50 MG/1
50 TABLET ORAL DAILY
COMMUNITY

## 2022-03-23 RX ORDER — HYDROCHLOROTHIAZIDE 25 MG/1
25 TABLET ORAL DAILY
COMMUNITY

## 2022-03-23 RX ORDER — POTASSIUM CHLORIDE 1.5 G/1.77G
20 POWDER, FOR SOLUTION ORAL DAILY
COMMUNITY

## 2022-03-23 RX ORDER — ATORVASTATIN CALCIUM 10 MG/1
10 TABLET, FILM COATED ORAL DAILY
COMMUNITY

## 2022-03-23 RX ORDER — PANTOPRAZOLE SODIUM 40 MG/1
40 TABLET, DELAYED RELEASE ORAL DAILY
COMMUNITY

## 2022-03-23 NOTE — PROGRESS NOTES
This 40-year-old right-handed woman was referred for evaluation and management of longstanding multiple sclerosis. She remained an excellent historian. Her medications were now rivaroxaban, metoprolol, digoxin, pantoprazole, atorvastatin, losartan and hydrochlorothiazide    Her medical history remarkable for hypertension and recurrent atrial fibrillation. She underwent numerous ablations and conversions, but this arrhythmia returned in weeks. She continued well on rivaroxaban. She felt weaker when in atrial fibrillation. She also suffered from irritable bowel syndrome, gastroesophageal reflux disease and osteoarthritis. She again denied diabetes mellitus, heart disease, lung disorders, strokes, seizures, current kidney disease, cancers, other gastrointestinal issues, other connective tissue disorders or depression. 21 years ago she developed numbness and then weakness of her entire left leg. Soon afterwards, she experienced numbness of her entire face. There was also occasional choking. The diagnosis of multiple sclerosis was confirmed. She was initially prescribed interferons. She previously noted intermittent left-sided weakness that resolved well. She was never on other disease modifying therapy. During her recent hospitalization for cardiac issues, she noted tingling in both feet and legs. The sensations were felt to be a multiple sclerosis exacerbation; she received a short course of steroids. This tingling continued. These issues were then felt secondary to markedly flat feet. Orthotics improved her discomforts. She again denied back pains or radiating leg discomforts. She also reported tingling in both hands, felt secondary to minimal carpal tunnel syndrome. She did not wish to undergo nerve conduction studies at this time.     She denied other neurological issues, as cognitive decline, visual loss, speech difficulties, swallowing or chewing abnormalities, clumsiness, headaches or other pains. She reported no other medical issues. She had received the COVID-19 vaccinations and booster. Review of systems was otherwise unremarkable. Physical examination displayed stable vital signs, with a blood pressure 137/95. She was elderly woman in no acute distress, who was alert, cooperative and oriented. She was very pleasant. Her skin was unremarkable. Her lungs were clear to auscultation. Cardiac testing found an irregularly irregular rhythm. Her limbs displayed marked bilateral pes planus deformities, more so in the left foot. I again found Tinel's signs at both wrists and tarsal tunnels. There remained tenderness in the soles of both feet. Neurological examination produced an intact mental status. I now found no red desaturation or [place] pupil. I found normal tone and bulk with 5/5 strength throughout. Reflexes were +2 in the arms, +1 at the knees and absent at the ankle. There were no pathological reflexes. Light touch and pinprick were intact. Vibration was slightly decreased in the left ankle. There were no coordination abnormalities. Her gait was faster, but arthritic. Romberg's was unremarkable. Laboratory data included t MRIs of her head and cervical spine which revealed only chronic demyelinating plaques. Recent CMP was unrevealing except for GFR 30. CBC with differential was unrevealing. Echocardiogram displayed only minimal tricuspid and mitral regurgitation and minimal left ventricular hypertrophy. This individual presents with longstanding multiple sclerosis. She again displays only minimal disease. She continues well with discontinuing glatiramer. Her tingling in her calves and feet are related to her marked plantar fasciitis and tarsal tunnel syndrome. She has improved with shoe inserts. .    She also suffers from minimal carpal tunnel syndrome, as well as tarsal tunnel syndrome.   She is stable medically despite her comorbidities. Unfortunately, she remains in atrial fibrillation despite ablations and conversion therapies. She is symptomatic when losing her atrial kick. She will return as needed. She will call if her carpal tunnel syndromes worsen. She will call anytime if problems arise. I spent 40 minutes with the patient with over 50 % spent in counseling and disease management. All patient issues were addressed and all questions were answered.

## 2022-05-12 ENCOUNTER — HOSPITAL ENCOUNTER (INPATIENT)
Age: 67
LOS: 1 days | Discharge: HOME | DRG: 310 | End: 2022-05-13
Attending: EMERGENCY MEDICINE | Admitting: INTERNAL MEDICINE
Payer: MEDICARE

## 2022-05-12 ENCOUNTER — APPOINTMENT (OUTPATIENT)
Dept: GENERAL RADIOLOGY | Age: 67
DRG: 310 | End: 2022-05-12
Payer: MEDICARE

## 2022-05-12 DIAGNOSIS — I48.91 ATRIAL FIBRILLATION WITH RAPID VENTRICULAR RESPONSE (HCC): Primary | ICD-10-CM

## 2022-05-12 PROBLEM — N18.9 CKD (CHRONIC KIDNEY DISEASE): Status: ACTIVE | Noted: 2022-05-12

## 2022-05-12 PROBLEM — E78.5 HLD (HYPERLIPIDEMIA): Status: ACTIVE | Noted: 2022-05-12

## 2022-05-12 PROBLEM — I10 PRIMARY HYPERTENSION: Status: ACTIVE | Noted: 2022-05-12

## 2022-05-12 LAB
ALBUMIN SERPL-MCNC: 3.9 G/DL (ref 3.5–5.2)
ALP BLD-CCNC: 88 U/L (ref 35–104)
ALT SERPL-CCNC: 24 U/L (ref 0–32)
ANION GAP SERPL CALCULATED.3IONS-SCNC: 12 MMOL/L (ref 7–16)
AST SERPL-CCNC: 21 U/L (ref 0–31)
BASOPHILS ABSOLUTE: 0.07 E9/L (ref 0–0.2)
BASOPHILS RELATIVE PERCENT: 0.5 % (ref 0–2)
BILIRUB SERPL-MCNC: 0.4 MG/DL (ref 0–1.2)
BUN BLDV-MCNC: 27 MG/DL (ref 6–23)
CALCIUM SERPL-MCNC: 8.8 MG/DL (ref 8.6–10.2)
CHLORIDE BLD-SCNC: 108 MMOL/L (ref 98–107)
CO2: 26 MMOL/L (ref 22–29)
CREAT SERPL-MCNC: 1.4 MG/DL (ref 0.5–1)
EOSINOPHILS ABSOLUTE: 0.33 E9/L (ref 0.05–0.5)
EOSINOPHILS RELATIVE PERCENT: 2.6 % (ref 0–6)
GFR AFRICAN AMERICAN: 45
GFR NON-AFRICAN AMERICAN: 37 ML/MIN/1.73
GLUCOSE BLD-MCNC: 156 MG/DL (ref 74–99)
HCT VFR BLD CALC: 35 % (ref 34–48)
HEMOGLOBIN: 11 G/DL (ref 11.5–15.5)
IMMATURE GRANULOCYTES #: 0.07 E9/L
IMMATURE GRANULOCYTES %: 0.5 % (ref 0–5)
LYMPHOCYTES ABSOLUTE: 2.6 E9/L (ref 1.5–4)
LYMPHOCYTES RELATIVE PERCENT: 20.4 % (ref 20–42)
MCH RBC QN AUTO: 29 PG (ref 26–35)
MCHC RBC AUTO-ENTMCNC: 31.4 % (ref 32–34.5)
MCV RBC AUTO: 92.3 FL (ref 80–99.9)
MONOCYTES ABSOLUTE: 1.12 E9/L (ref 0.1–0.95)
MONOCYTES RELATIVE PERCENT: 8.8 % (ref 2–12)
NEUTROPHILS ABSOLUTE: 8.57 E9/L (ref 1.8–7.3)
NEUTROPHILS RELATIVE PERCENT: 67.2 % (ref 43–80)
PDW BLD-RTO: 15.4 FL (ref 11.5–15)
PLATELET # BLD: 288 E9/L (ref 130–450)
PMV BLD AUTO: 10.4 FL (ref 7–12)
POTASSIUM REFLEX MAGNESIUM: 3.7 MMOL/L (ref 3.5–5)
PRO-BNP: 1545 PG/ML (ref 0–125)
RBC # BLD: 3.79 E12/L (ref 3.5–5.5)
SODIUM BLD-SCNC: 146 MMOL/L (ref 132–146)
TOTAL PROTEIN: 7.1 G/DL (ref 6.4–8.3)
TROPONIN, HIGH SENSITIVITY: 16 NG/L (ref 0–9)
WBC # BLD: 12.8 E9/L (ref 4.5–11.5)

## 2022-05-12 PROCEDURE — 93005 ELECTROCARDIOGRAM TRACING: CPT | Performed by: PHYSICIAN ASSISTANT

## 2022-05-12 PROCEDURE — 83880 ASSAY OF NATRIURETIC PEPTIDE: CPT

## 2022-05-12 PROCEDURE — 2140000000 HC CCU INTERMEDIATE R&B

## 2022-05-12 PROCEDURE — 96374 THER/PROPH/DIAG INJ IV PUSH: CPT

## 2022-05-12 PROCEDURE — 84484 ASSAY OF TROPONIN QUANT: CPT

## 2022-05-12 PROCEDURE — 80053 COMPREHEN METABOLIC PANEL: CPT

## 2022-05-12 PROCEDURE — 93005 ELECTROCARDIOGRAM TRACING: CPT | Performed by: EMERGENCY MEDICINE

## 2022-05-12 PROCEDURE — 99285 EMERGENCY DEPT VISIT HI MDM: CPT

## 2022-05-12 PROCEDURE — 71045 X-RAY EXAM CHEST 1 VIEW: CPT

## 2022-05-12 PROCEDURE — 85025 COMPLETE CBC W/AUTO DIFF WBC: CPT

## 2022-05-12 PROCEDURE — 2500000003 HC RX 250 WO HCPCS: Performed by: EMERGENCY MEDICINE

## 2022-05-12 PROCEDURE — 2580000003 HC RX 258: Performed by: EMERGENCY MEDICINE

## 2022-05-12 RX ADMIN — DILTIAZEM HYDROCHLORIDE 25 MG: 5 INJECTION INTRAVENOUS at 21:31

## 2022-05-12 ASSESSMENT — PAIN SCALES - GENERAL: PAINLEVEL_OUTOF10: 4

## 2022-05-12 ASSESSMENT — PAIN - FUNCTIONAL ASSESSMENT: PAIN_FUNCTIONAL_ASSESSMENT: 0-10

## 2022-05-12 ASSESSMENT — PAIN DESCRIPTION - FREQUENCY: FREQUENCY: CONTINUOUS

## 2022-05-12 ASSESSMENT — PAIN DESCRIPTION - DESCRIPTORS: DESCRIPTORS: CRUSHING

## 2022-05-13 VITALS
BODY MASS INDEX: 31.34 KG/M2 | TEMPERATURE: 96.4 F | RESPIRATION RATE: 20 BRPM | HEIGHT: 66 IN | OXYGEN SATURATION: 99 % | DIASTOLIC BLOOD PRESSURE: 79 MMHG | WEIGHT: 195 LBS | SYSTOLIC BLOOD PRESSURE: 161 MMHG | HEART RATE: 60 BPM

## 2022-05-13 PROBLEM — R00.2 PALPITATION: Status: ACTIVE | Noted: 2022-05-13

## 2022-05-13 LAB
ANION GAP SERPL CALCULATED.3IONS-SCNC: 11 MMOL/L (ref 7–16)
BASOPHILS ABSOLUTE: 0.08 E9/L (ref 0–0.2)
BASOPHILS RELATIVE PERCENT: 0.7 % (ref 0–2)
BUN BLDV-MCNC: 27 MG/DL (ref 6–23)
CALCIUM SERPL-MCNC: 8.3 MG/DL (ref 8.6–10.2)
CHLORIDE BLD-SCNC: 110 MMOL/L (ref 98–107)
CHOLESTEROL, FASTING: 112 MG/DL (ref 0–199)
CO2: 24 MMOL/L (ref 22–29)
CREAT SERPL-MCNC: 1.2 MG/DL (ref 0.5–1)
EKG ATRIAL RATE: 129 BPM
EKG Q-T INTERVAL: 270 MS
EKG QRS DURATION: 76 MS
EKG QTC CALCULATION (BAZETT): 433 MS
EKG R AXIS: 34 DEGREES
EKG T AXIS: 114 DEGREES
EKG VENTRICULAR RATE: 155 BPM
EOSINOPHILS ABSOLUTE: 0.26 E9/L (ref 0.05–0.5)
EOSINOPHILS RELATIVE PERCENT: 2.3 % (ref 0–6)
GFR AFRICAN AMERICAN: 54
GFR NON-AFRICAN AMERICAN: 45 ML/MIN/1.73
GLUCOSE BLD-MCNC: 109 MG/DL (ref 74–99)
HBA1C MFR BLD: 5.4 % (ref 4–5.6)
HCT VFR BLD CALC: 31 % (ref 34–48)
HDLC SERPL-MCNC: 45 MG/DL
HEMOGLOBIN: 9.7 G/DL (ref 11.5–15.5)
IMMATURE GRANULOCYTES #: 0.04 E9/L
IMMATURE GRANULOCYTES %: 0.4 % (ref 0–5)
LDL CHOLESTEROL CALCULATED: 56 MG/DL (ref 0–99)
LYMPHOCYTES ABSOLUTE: 2.42 E9/L (ref 1.5–4)
LYMPHOCYTES RELATIVE PERCENT: 21.6 % (ref 20–42)
MCH RBC QN AUTO: 28.9 PG (ref 26–35)
MCHC RBC AUTO-ENTMCNC: 31.3 % (ref 32–34.5)
MCV RBC AUTO: 92.3 FL (ref 80–99.9)
METER GLUCOSE: 113 MG/DL (ref 74–99)
MONOCYTES ABSOLUTE: 1.1 E9/L (ref 0.1–0.95)
MONOCYTES RELATIVE PERCENT: 9.8 % (ref 2–12)
NEUTROPHILS ABSOLUTE: 7.31 E9/L (ref 1.8–7.3)
NEUTROPHILS RELATIVE PERCENT: 65.2 % (ref 43–80)
PDW BLD-RTO: 15.5 FL (ref 11.5–15)
PLATELET # BLD: 248 E9/L (ref 130–450)
PMV BLD AUTO: 10.2 FL (ref 7–12)
POTASSIUM REFLEX MAGNESIUM: 4.2 MMOL/L (ref 3.5–5)
RBC # BLD: 3.36 E12/L (ref 3.5–5.5)
SODIUM BLD-SCNC: 145 MMOL/L (ref 132–146)
TRIGLYCERIDE, FASTING: 53 MG/DL (ref 0–149)
TROPONIN, HIGH SENSITIVITY: 18 NG/L (ref 0–9)
VLDLC SERPL CALC-MCNC: 11 MG/DL
WBC # BLD: 11.2 E9/L (ref 4.5–11.5)

## 2022-05-13 PROCEDURE — 93010 ELECTROCARDIOGRAM REPORT: CPT | Performed by: INTERNAL MEDICINE

## 2022-05-13 PROCEDURE — 80061 LIPID PANEL: CPT

## 2022-05-13 PROCEDURE — 6370000000 HC RX 637 (ALT 250 FOR IP): Performed by: NURSE PRACTITIONER

## 2022-05-13 PROCEDURE — 36415 COLL VENOUS BLD VENIPUNCTURE: CPT

## 2022-05-13 PROCEDURE — 82962 GLUCOSE BLOOD TEST: CPT

## 2022-05-13 PROCEDURE — 84484 ASSAY OF TROPONIN QUANT: CPT

## 2022-05-13 PROCEDURE — 2580000003 HC RX 258: Performed by: NURSE PRACTITIONER

## 2022-05-13 PROCEDURE — 80048 BASIC METABOLIC PNL TOTAL CA: CPT

## 2022-05-13 PROCEDURE — 83036 HEMOGLOBIN GLYCOSYLATED A1C: CPT

## 2022-05-13 PROCEDURE — 85025 COMPLETE CBC W/AUTO DIFF WBC: CPT

## 2022-05-13 RX ORDER — DIGOXIN 125 MCG
125 TABLET ORAL DAILY
Status: DISCONTINUED | OUTPATIENT
Start: 2022-05-13 | End: 2022-05-13 | Stop reason: HOSPADM

## 2022-05-13 RX ORDER — SODIUM CHLORIDE 0.9 % (FLUSH) 0.9 %
5-40 SYRINGE (ML) INJECTION PRN
Status: DISCONTINUED | OUTPATIENT
Start: 2022-05-13 | End: 2022-05-13 | Stop reason: HOSPADM

## 2022-05-13 RX ORDER — ACETAMINOPHEN 325 MG/1
650 TABLET ORAL EVERY 6 HOURS PRN
Status: DISCONTINUED | OUTPATIENT
Start: 2022-05-13 | End: 2022-05-13 | Stop reason: HOSPADM

## 2022-05-13 RX ORDER — ONDANSETRON 2 MG/ML
4 INJECTION INTRAMUSCULAR; INTRAVENOUS EVERY 6 HOURS PRN
Status: DISCONTINUED | OUTPATIENT
Start: 2022-05-13 | End: 2022-05-13 | Stop reason: HOSPADM

## 2022-05-13 RX ORDER — SODIUM CHLORIDE 9 MG/ML
INJECTION, SOLUTION INTRAVENOUS PRN
Status: DISCONTINUED | OUTPATIENT
Start: 2022-05-13 | End: 2022-05-13 | Stop reason: HOSPADM

## 2022-05-13 RX ORDER — INSULIN LISPRO 100 [IU]/ML
0-3 INJECTION, SOLUTION INTRAVENOUS; SUBCUTANEOUS NIGHTLY
Status: DISCONTINUED | OUTPATIENT
Start: 2022-05-13 | End: 2022-05-13 | Stop reason: HOSPADM

## 2022-05-13 RX ORDER — DEXTROSE MONOHYDRATE 25 G/50ML
12.5 INJECTION, SOLUTION INTRAVENOUS PRN
Status: DISCONTINUED | OUTPATIENT
Start: 2022-05-13 | End: 2022-05-13 | Stop reason: HOSPADM

## 2022-05-13 RX ORDER — ACETAMINOPHEN 650 MG/1
650 SUPPOSITORY RECTAL EVERY 6 HOURS PRN
Status: DISCONTINUED | OUTPATIENT
Start: 2022-05-13 | End: 2022-05-13 | Stop reason: HOSPADM

## 2022-05-13 RX ORDER — PANTOPRAZOLE SODIUM 40 MG/1
40 TABLET, DELAYED RELEASE ORAL DAILY
Status: DISCONTINUED | OUTPATIENT
Start: 2022-05-13 | End: 2022-05-13 | Stop reason: HOSPADM

## 2022-05-13 RX ORDER — ONDANSETRON 4 MG/1
4 TABLET, ORALLY DISINTEGRATING ORAL EVERY 8 HOURS PRN
Status: DISCONTINUED | OUTPATIENT
Start: 2022-05-13 | End: 2022-05-13 | Stop reason: HOSPADM

## 2022-05-13 RX ORDER — LACTOBACILLUS RHAMNOSUS GG 10B CELL
1 CAPSULE ORAL DAILY
Status: DISCONTINUED | OUTPATIENT
Start: 2022-05-13 | End: 2022-05-13 | Stop reason: HOSPADM

## 2022-05-13 RX ORDER — ATORVASTATIN CALCIUM 20 MG/1
10 TABLET, FILM COATED ORAL NIGHTLY
Status: DISCONTINUED | OUTPATIENT
Start: 2022-05-13 | End: 2022-05-13 | Stop reason: HOSPADM

## 2022-05-13 RX ORDER — METOPROLOL SUCCINATE 100 MG/1
200 TABLET, EXTENDED RELEASE ORAL 2 TIMES DAILY
Status: DISCONTINUED | OUTPATIENT
Start: 2022-05-13 | End: 2022-05-13 | Stop reason: HOSPADM

## 2022-05-13 RX ORDER — DEXTROSE MONOHYDRATE 50 MG/ML
100 INJECTION, SOLUTION INTRAVENOUS PRN
Status: DISCONTINUED | OUTPATIENT
Start: 2022-05-13 | End: 2022-05-13 | Stop reason: HOSPADM

## 2022-05-13 RX ORDER — INSULIN LISPRO 100 [IU]/ML
0-6 INJECTION, SOLUTION INTRAVENOUS; SUBCUTANEOUS
Status: DISCONTINUED | OUTPATIENT
Start: 2022-05-13 | End: 2022-05-13 | Stop reason: HOSPADM

## 2022-05-13 RX ORDER — METOPROLOL SUCCINATE 200 MG/1
200 TABLET, EXTENDED RELEASE ORAL 2 TIMES DAILY
Qty: 30 TABLET | Refills: 3 | Status: SHIPPED | OUTPATIENT
Start: 2022-05-13

## 2022-05-13 RX ORDER — SODIUM CHLORIDE 0.9 % (FLUSH) 0.9 %
5-40 SYRINGE (ML) INJECTION EVERY 12 HOURS SCHEDULED
Status: DISCONTINUED | OUTPATIENT
Start: 2022-05-13 | End: 2022-05-13 | Stop reason: HOSPADM

## 2022-05-13 RX ADMIN — Medication 1 CAPSULE: at 09:23

## 2022-05-13 RX ADMIN — POTASSIUM BICARBONATE 20 MEQ: 782 TABLET, EFFERVESCENT ORAL at 09:22

## 2022-05-13 RX ADMIN — METOPROLOL SUCCINATE 200 MG: 50 TABLET, EXTENDED RELEASE ORAL at 09:22

## 2022-05-13 RX ADMIN — PANTOPRAZOLE SODIUM 40 MG: 40 TABLET, DELAYED RELEASE ORAL at 09:22

## 2022-05-13 RX ADMIN — DIGOXIN 125 MCG: 125 TABLET ORAL at 09:22

## 2022-05-13 RX ADMIN — SODIUM CHLORIDE, PRESERVATIVE FREE 10 ML: 5 INJECTION INTRAVENOUS at 09:22

## 2022-05-13 ASSESSMENT — PAIN SCALES - GENERAL
PAINLEVEL_OUTOF10: 0
PAINLEVEL_OUTOF10: 0

## 2022-05-13 NOTE — ED PROVIDER NOTES
HPI:  22, Time: 9:05 PM EDT         Andree Bird is a 69 y.o. female presenting to the ED for palpitations, beginning hours ago. The complaint has been persistent, moderate in severity, and worsened by nothing. Patient reporting heart racing and shortness of breath and nausea that started suddenly at home. Patient does have history of atrial fibrillation on Xarelto. Patient reporting no vomiting no diarrhea. Patient reporting no fever no chills she reports no productive cough. Shake patient reports intermittent abdominal pain currently having no abdominal pain. Patient reports mild swelling to her lower extremities. There is no weakness or dizziness. ROS:   Pertinent positives and negatives are stated within HPI, all other systems reviewed and are negative.  --------------------------------------------- PAST HISTORY ---------------------------------------------  Past Medical History:  has a past medical history of A-fib ([facility] Diabetes mellitus ([facility] HTN (hypertension), and MS (multiple sclerosis) [facility] Past Surgical History:  has a past surgical history that includes  section (); joint replacement (Bilateral, [id number]); Cholecystectomy; Cardioversion (2021); and Cardioversion (10/13/2021). Social History:  reports that she has never smoked. She has never used smokeless tobacco. She reports previous alcohol use. She reports previous drug use. Family History: family history includes Atrial Fibrillation in her father; Stroke in her mother. The patients home medications have been reviewed. Allergies: Patient has no known allergies.     ---------------------------------------------------PHYSICAL EXAM--------------------------------------    Constitutional/General: Alert and oriented x3,   Head: Normocephalic and atraumatic  Eyes: PERRL, EOMI  Mouth: Oropharynx clear, handling secretions, no trismus  Neck: Supple, full ROM, non tender to palpation in the midline, no stridor, no crepitus, no meningeal signs  Pulmonary: Lungs clear to auscultation bilaterally, no wheezes, rales, or rhonchi. Not in respiratory distress  Cardiovascular: Rapid and irregular. No murmurs, gallops, or rubs. 2+ distal pulses  Chest: no chest wall tenderness  Abdomen: Soft. Non tender. Non distended. +BS. No rebound, guarding, or rigidity. No pulsatile masses appreciated. Musculoskeletal: Moves all extremities x 4. Warm and well perfused, no clubbing, cyanosis, mild edema lower extremities capillary refill <3 seconds  Skin: warm and dry. No rashes. Neurologic: GCS 15, CN 2-12 grossly intact, no focal deficits, symmetric strength 5/5 in the upper and lower extremities bilaterally  Psych: Normal Affect    -------------------------------------------------- RESULTS -------------------------------------------------  I have personally reviewed all laboratory and imaging results for this patient. Results are listed below.      LABS:  Results for orders placed or performed during the hospital encounter of 05/12/22   CBC with Auto Differential   Result Value Ref Range    WBC 12.8 (H) 4.5 - 11.5 E9/L    RBC 3.79 3.50 - 5.50 E12/L    Hemoglobin 11.0 (L) 11.5 - 15.5 g/dL    Hematocrit 35.0 34.0 - 48.0 %    MCV 92.3 80.0 - 99.9 fL    MCH 29.0 26.0 - 35.0 pg    MCHC 31.4 (L) 32.0 - 34.5 %    RDW 15.4 (H) 11.5 - 15.0 fL    Platelets 299 355 - 830 E9/L    MPV 10.4 7.0 - 12.0 fL    Neutrophils % 67.2 43.0 - 80.0 %    Immature Granulocytes % 0.5 0.0 - 5.0 %    Lymphocytes % 20.4 20.0 - 42.0 %    Monocytes % 8.8 2.0 - 12.0 %    Eosinophils % 2.6 0.0 - 6.0 %    Basophils % 0.5 0.0 - 2.0 %    Neutrophils Absolute 8.57 (H) 1.80 - 7.30 E9/L    Immature Granulocytes # 0.07 E9/L    Lymphocytes Absolute 2.60 1.50 - 4.00 E9/L    Monocytes Absolute 1.12 (H) 0.10 - 0.95 E9/L    Eosinophils Absolute 0.33 0.05 - 0.50 E9/L    Basophils Absolute 0.07 0.00 - 0.20 E9/L   EKG 12 Lead   Result Value Ref Range    Ventricular Rate 155 BPM    Atrial Rate 129 BPM    QRS Duration 76 ms    Q-T Interval 270 ms    QTc Calculation (Bazett) 433 ms    R Axis 34 degrees    T Axis 114 degrees       RADIOLOGY:  Interpreted by Radiologist.  XR CHEST PORTABLE   Final Result   No acute process. EKG:  This EKG is signed and interpreted by me. Rate: 155  Rhythm: Atrial fibrillation  Interpretation: non-specific EKG  Comparison: changes compared to previous EKG    REPEAT EKG: This EKG is signed and interpreted by ED Physician. Rate: 63  Rhythm: Sinus. Interpretation: no acute changes. Comparison: Improved compared to prior EKG.      ------------------------- NURSING NOTES AND VITALS REVIEWED ---------------------------   The nursing notes within the ED encounter and vital signs as below have been reviewed by myself. BP (!) 151/105   Pulse 168   Temp 98.1 °F (36.7 °C) (Oral)   Resp 20   Ht 5' 6\" (1.676 m)   Wt 195 lb (88.5 kg)   SpO2 97%   BMI 31.47 kg/m²   Oxygen Saturation Interpretation: Normal    The patients available past medical records and past encounters were reviewed. ------------------------------ ED COURSE/MEDICAL DECISION MAKING----------------------  Medications   diltiazem (CARDIZEM) 25 mg in dextrose 5% 30 mL IVPB (ER Load) (25 mg IntraVENous New Bag 5/12/22 2131)             Medical Decision Making:   Patient presenting here because of elevated heart rate. Patient was reporting some mild chest discomfort. Patient reports history of atrial fibrillation. Patient reporting some nausea. Patient was in A. fib RVR given Cardizem bolus. Patient heart rate has come down. Re-Evaluations:  Patient reevaluated a little after 9:50 PM.  Patient's heart rate has come down in the 60s. Patient reporting no chest pain. Patient reports much improved. Did reassess patient little after 10:25 PM.  Patient having no chest discomfort.   Patient made aware of results and findings and plan for admission. Consultations:          internal medicine       Critical Care: This patient's ED course included: a personal history and physicial eaxmination    This patient has been closely monitored during their ED course. Counseling: The emergency provider has spoken with the patient and discussed todays results, in addition to providing specific details for the plan of care and counseling regarding the diagnosis and prognosis. Questions are answered at this time and they are agreeable with the plan.       --------------------------------- IMPRESSION AND DISPOSITION ---------------------------------    IMPRESSION  1. Atrial fibrillation with rapid ventricular response (HCC)        DISPOSITION  Disposition: Admit to telemetry  Patient condition is stable        NOTE: This report was transcribed using voice recognition software.  Every effort was made to ensure accuracy; however, inadvertent computerized transcription errors may be present          Taty Corrales MD  05/12/22 2825       Taty Corrales MD  05/12/22 1347

## 2022-05-13 NOTE — H&P
[facility]   History and Physical      CHIEF COMPLAINT: Palpitations, heart racing    Reason for Admission: Atrial fibrillation with RVR    History Obtained From: Patient/EMR    HISTORY OF PRESENT ILLNESS:      The patient is a 69 y.o. female of Elmer Acosta MD with significant past medical history of atrial fibrillation on Xarelto status post DCCV's historically-most recent 1 I am seeing on the chart is from March 2021, diabetes, hypertension, hyperlipidemia and multiple sclerosis who presents with complaints of persistent heart racing associated with shortness of breath. Patient denies any chest heaviness or discomfort. Her symptoms started at rest, no exertional activities reported. Evaluation in the emergency department revealed A. fib with RVR, heart rate in the 170s on arrival.  Patient received Cardizem bolus and then was initiated on a drip. Subsequently admitted          All labs personally reviewed   All imaging personally reviewed     Past Medical History:        Diagnosis Date    A-fib ([facility] 02/03/2021    Diabetes mellitus ([facility]     HLD (hyperlipidemia) 5/12/2022    HTN (hypertension)     MS (multiple sclerosis) ([facility]      Past Surgical History:        Procedure Laterality Date    CARDIOVERSION  03/08/2021    DR. HINSON SUCCESFUL TO SR, ONE SHOCK 200 JOULES    CARDIOVERSION  10/13/2021    Dr Fransisco Leger Bilateral 2014,2017         Medications Prior to Admission:    Medications Prior to Admission: losartan (COZAAR) 50 MG tablet, Take 50 mg by mouth daily  atorvastatin (LIPITOR) 10 MG tablet, Take 10 mg by mouth daily  pantoprazole (PROTONIX) 40 MG tablet, Take 40 mg by mouth daily (Patient not taking: Reported on 5/13/2022)  hydroCHLOROthiazide (HYDRODIURIL) 25 MG tablet, Take 25 mg by mouth daily (Patient not taking: Reported on 5/13/2022)  potassium chloride (KLOR-CON) 20 MEQ packet, Take 20 mEq by mouth daily  rivaroxaban (XARELTO) 20 MG TABS tablet, Take 20 mg by mouth Daily with supper   lactobacillus (CULTURELLE) CAPS capsule, Take 1 capsule by mouth daily  metoprolol succinate (TOPROL XL) 50 MG extended release tablet, Take 3 tablets by mouth 2 times daily (Patient taking differently: Take 200 mg by mouth 2 times daily )  digoxin (LANOXIN) 125 MCG tablet, Take 1 tablet by mouth daily (Patient not taking: Reported on 5/13/2022)    Allergies:  Patient has no known allergies. Social History:   TOBACCO:   reports that she has never smoked. She has never used smokeless tobacco.  ETOH:   reports previous alcohol use. MARITAL STATUS:    OCCUPATION:      Family History:       Problem Relation Age of Onset    Stroke Mother     Atrial Fibrillation Father        REVIEW OF SYSTEMS:    General ROS: positive for  - sob  Hematological and Lymphatic ROS: negative  Endocrine ROS: negative  Respiratory ROS: no cough, shortness of breath, or wheezing  Cardiovascular ROS: no chest pain or dyspnea on exertion  Gastrointestinal ROS: no abdominal pain, change in bowel habits, or black or bloody stools  Genito-Urinary ROS: no dysuria, trouble voiding, or hematuria  Neurological ROS: no TIA or stroke symptoms  negative    Vitals:  BP (!) 162/85   Pulse 61   Temp 97.4 °F (36.3 °C) (Temporal)   Resp 18   Ht 5' 6\" (1.676 m)   Wt 195 lb (88.5 kg)   SpO2 98%   BMI 31.47 kg/m²     PHYSICAL EXAM:  General:  Awake, alert, oriented X 3. Well developed, well nourished, well groomed. No apparent distress. HEENT:  Normocephalic, atraumatic. Pupils equal, round, reactive to light. No scleral icterus. No conjunctival injection. Normal lips, teeth, and gums. No nasal discharge. Neck:  Supple, FROM  Heart:  RRR, no murmurs, gallops, rubs, carotid upstroke normal, no carotid bruits  Lungs:  CTA bilaterally, bilat symmetrical expansion, no wheeze, rales, or rhonchi  Abdomen:   Bowel sounds present, soft, nontender, no masses, no organomegaly, no peritoneal signs  Extremities:  No clubbing, cyanosis, or edema  Skin:  Warm and dry, no open lesions or rash  Neuro:  Cranial nerves 2-12 intact, no focal deficits  Vascular: Radial and pedal Pulses 2+  Breast: deferred  Rectal: deferred  Genitalia:  deferred      DATA:     Recent Labs     05/12/22 2127 05/13/22 0428   WBC 12.8* 11.2   HGB 11.0* 9.7*    248     Recent Labs     05/12/22 2127 05/13/22 0428    145   K 3.7 4.2   BUN 27* 27*   CREATININE 1.4* 1.2*     Recent Labs     05/12/22 2127   PROT 7.1     Recent Labs     05/12/22 2127   AST 21   ALT 24   ALKPHOS 88   BILITOT 0.4     No results for input(s): BNP in the last 72 hours. No results for input(s): CKTOTAL, CKMB, CKMBINDEX, TROPONINI in the last 72 hours. ASSESSMENT:      Principal Problem:    Atrial fibrillation with RVR (HCC)  Active Problems:    CKD (chronic kidney disease)    HLD (hyperlipidemia)    Primary hypertension    Chest pain    GERD (gastroesophageal reflux disease)    Obesity  Resolved Problems:    * No resolved hospital problems. *        PLAN:    59-year-old  woman with known history of atrial fibrillation, multiple sclerosis, hypertension hyperlipidemia presents to the emergency department with palpitations-found to be in A. fib with rapid ventricular rate.     Admit to telemetry-   oral rate control, Toprol- mg twice daily, digoxin 125 daily  Continue oral anticoagulation with Xarelto-ensure compliance  Cardiology evaluation for titration and negative chronotropic's  Investigate underlying etiology of A. fib RVR  Mild elevation in BNP at 1500  Leukocytosis likely reactive 12.8-check UA, chest x-ray negative for acute process  Consideration for DCCV-defer to cardiology  Keep n.p.o. until above finalized      DVT prophylaxis   PT OT  Discharge plan-patient remains with weight control, she may be discharged after cardiology evaluation today    Raquel Christensen MD  5/13/2022  8:11 AM

## 2022-05-13 NOTE — CARE COORDINATION
Care Coordination  Met with patient at bedside to discuss transition care planning. The patient lives with her  in a 1 story home 3 steps to enter. She has no dme as she was independent prior to admission. Hwe primary care physician is Dr Davidson Wade and her pharmacy is [facility]. The patient came in due to chest pain and palpations. Cardiology was consulted  Plan is for a stress test. Bnp was 1545. The patient has a pmh of afib and is on Xarelto 20 mg po qd. Per cardiology she is cleared for discharge home today . Her ride home is her .

## 2022-05-13 NOTE — ED NOTES
Department of Emergency Medicine  FIRST PROVIDER TRIAGE NOTE             Independent MLP           5/12/22  8:58 PM EDT    Date of Encounter: 5/12/22   MRN: [id number]      HPI: Katherine Dupree is a 69 y.o. female who presents to the ED for Shortness of Breath (States hx of afib, was watching TV and got very nauseated and short of breath, states happened very suddenly. )  Patient comes in with complaint of feeling her heart race nausea. She has a history of afib    ROS: Negative for fever or cough. PE: Gen Appearance/Constitutional: alert  CV: irregular rate  Pulm: CTA bilat     Initial Plan of Care: All treatment areas with department are currently occupied. Plan to order/Initiate the following while awaiting opening in ED: labs, EKG and imaging studies.   Initiate Treatment-Testing, Proceed toTreatment Area When Bed Available for ED Attending/MLP to Continue Care    Electronically signed by HENRY Patrick   DD: 5/12/22       HENRY Patrick  05/12/22 6812  ATTENDING PROVIDER ATTESTATION:     Supervising Physician, on-site, available for consultation, non-participatory in the evaluation or care of this patient       Volodymyr Jamison MD  05/12/22 1428

## 2022-05-13 NOTE — CONSULTS
[address] CONSULT    Name: Pamela Gamboa    Age: 69 y.o. Date of Admission: 5/12/2022  9:03 PM    Date of Service: 5/13/2022    Reason for Consultation: Palpitations and chest pain    Chief Complaint: Same    Referring Physician: Dr. Yesi Harden    History of Present Illness: The patient is a 69y.o. year old female who developed abrupt onset of palpitations with vague chest pain at rest yesterday evening and presented to the emergency department where she was documented to be in atrial fibrillation with RVR. Converted to sinus rhythm with resolution of all symptoms, after IV diltiazem. This morning she is resting comfortably without complaints. Denies recent exertional chest pain or dyspnea. No PND. No syncope or near syncope  History of radiofrequency ablation twice in 2021, recurrent A. fib and successful cardioversion earlier this year. Digoxin recently discontinued and metoprolol XL decreased to 150 mg twice daily. Documented normal LVEF with only mild regurgitant valvular heart disease and normal nuclear stress test.      Past Medical History:   Past Medical History:   Diagnosis Date    A-fib ([facility] 02/03/2021    Diabetes mellitus ([facility]     HLD (hyperlipidemia) 5/12/2022    HTN (hypertension)     MS (multiple sclerosis) (HCC)        Past Surgical History:  Past Surgical History:   Procedure Laterality Date    CARDIOVERSION  03/08/2021    DR. HINSON SUCCESFUL TO SR, ONE SHOCK [address]    CARDIOVERSION  10/13/2021    Dr Marco Cruz Bilateral 2014,2017       Family History:  Family History   Problem Relation Age of Onset    Stroke Mother     Atrial Fibrillation Father        Social History:  Social History     Socioeconomic History    Marital status:      Spouse name: Not on file    Number of children: Not on file    Years of education: 12    Highest education level: Not on file Occupational History    Not on file   Tobacco Use    Smoking status: Never Smoker    Smokeless tobacco: Never Used   Substance and Sexual Activity    Alcohol use: Not Currently    Drug use: Not Currently    Sexual activity: Not on file   Other Topics Concern    Not on file   Social History Narrative    Not on file     Social Determinants of Health     Financial Resource Strain:     Difficulty of Paying Living Expenses: Not on file   Food Insecurity:     Worried About Running Out of Food in the Last Year: Not on file    Marilou of Food in the Last Year: Not on file   Transportation Needs:     Lack of Transportation (Medical): Not on file    Lack of Transportation (Non-Medical): Not on file   Physical Activity:     Days of Exercise per Week: Not on file    Minutes of Exercise per Session: Not on file   Stress:     Feeling of Stress : Not on file   Social Connections:     Frequency of Communication with Friends and Family: Not on file    Frequency of Social Gatherings with Friends and Family: Not on file    Attends Zoroastrian Services: Not on file    Active Member of [address] [place] [address] or Organizations: Not on file    Attends Club or Organization Meetings: Not on file    Marital Status: Not on file   Intimate Partner Violence:     Fear of Current or Ex-Partner: Not on file    Emotionally Abused: Not on file    Physically Abused: Not on file    Sexually Abused: Not on file   Housing Stability:     Unable to Pay for Housing in the Last Year: Not on file    Number of [place] in the Last Year: Not on file    Unstable Housing in the Last Year: Not on file       Allergies:  No Known Allergies    Home Meds:  Prior to Admission medications    Medication Sig Start Date End Date Taking?  Authorizing Provider   losartan (COZAAR) 50 MG tablet Take 50 mg by mouth daily    Historical Provider, MD   atorvastatin (LIPITOR) 10 MG tablet Take 10 mg by mouth daily    Historical Provider, MD   pantoprazole (PROTONIX) 40 MG tablet Take 40 mg by mouth daily  Patient not taking: Reported on 5/13/2022    Historical Provider, MD   hydroCHLOROthiazide (HYDRODIURIL) 25 MG tablet Take 25 mg by mouth daily  Patient not taking: Reported on 5/13/2022    Historical Provider, MD   potassium chloride (KLOR-CON) 20 MEQ packet Take 20 mEq by mouth daily    Historical Provider, MD   rivaroxaban (XARELTO) 20 MG TABS tablet Take 20 mg by mouth Daily with supper     Historical Provider, MD   lactobacillus (CULTURELLE) CAPS capsule Take 1 capsule by mouth daily 2/8/21   Jennie Bradshaw MD   metoprolol succinate (TOPROL XL) 50 MG extended release tablet Take 3 tablets by mouth 2 times daily  Patient taking differently: Take 200 mg by mouth 2 times daily  2/7/21   Jennie Bradshaw MD   digoxin [facility]) 125 MCG tablet Take 1 tablet by mouth daily  Patient not taking: Reported on 5/13/2022 2/8/21   Jennie Bradshaw MD   amLODIPine (NORVASC) 5 MG tablet Inhale into the lungs 2/1/16 9/19/18  Historical Provider, MD       Current Medications:  Current Facility-Administered Medications   Medication Dose Route Frequency Provider Last Rate Last Admin    sodium chloride flush 0.9 % injection 5-40 mL  5-40 mL IntraVENous 2 times per day April RASHEL ReedN - CNP        sodium chloride flush 0.9 % injection 5-40 mL  5-40 mL IntraVENous PRN April Derek APRN - CNP        0.9 % sodium chloride infusion   IntraVENous PRN April RASHEL ReedN - CNP        ondansetron (ZOFRAN-ODT) disintegrating tablet 4 mg  4 mg Oral Q8H PRN April JUDY Reed - FLY        Or    [facility]) injection 4 mg  4 mg IntraVENous Q6H PRN April Derek, APRN - CNP        acetaminophen (TYLENOL) tablet 650 mg  650 mg Oral Q6H PRN April Derek, APRN - CNP        Or    acetaminophen (TYLENOL) suppository 650 mg  650 mg Rectal Q6H PRN April RASHEL ReedN - CNP        bisacodyl (DULCOLAX) EC tablet 5 mg  5 mg Oral Daily PRN hematuria  Endocrine: No excessive thirst, heat or cold intolerance. Musculoskeletal: No joint pain or muscle aches. No claudication  Skin: No skin breakdown or rashes  Neuro: No headache, confusion, or seizures  Psych: No depression, anxiety    Physical Exam:  BP (!) 157/74   Pulse 56   Temp 96.8 °F (36 °C)   Resp 16   Ht 5' 6\" (1.676 m)   Wt 195 lb (88.5 kg)   SpO2 97%   BMI 31.47 kg/m²   Weight change: Wt Readings from Last 3 Encounters:   05/12/22 195 lb (88.5 kg)   03/23/22 196 lb (88.9 kg)   03/07/22 190 lb (86.2 kg)       General: Awake, alert, oriented x3, no acute distress    HEENT: Normocephalic and atraumatic, pupils equal and round. Sclera nonicteric and oral mucosa moist.  Tongue and trachea midline. Neck: No JVD or bruits. No thyroid enlargement or adenopathy    Cardiac: Regular rate and rhythm, normal S1 and S2, no S3. Apical impulse is normal.  No murmurs, rubs or clicks. No carotid bruits and no abdominal bruits. No peripheral edema, cyanosis or clubbing. Normal pulses in the upper and lower extremities bilaterally. Resp: Unlabored respirations at rest.  No wheezes, rales or rhonchi. Abdomen: soft, nontender, nondistended, no gross organomegaly or mass    Skin: Warm and dry, no cyanosis. Musculoskeletal: normal tone and strength in the upper and lower extremities bilaterally    Neuro: Moves all extremities appropriately to command. Normal sensation to light touch in the upper and lower extremities bilaterally    Psych: Cooperative, and normal affect    Intake/Output:  No intake or output data in the 24 hours ending 05/13/22 0842  No intake/output data recorded. Laboratory Tests:  Lab Results   Component Value Date    CREATININE 1.2 (H) 05/13/2022    BUN 27 (H) 05/13/2022     05/13/2022    K 4.2 05/13/2022     (H) 05/13/2022    CO2 24 05/13/2022     No results for input(s): CKTOTAL, CKMB, TROPONINI in the last 72 hours.   Lab Results   Component Value Date PROBNP 1,545 (H) 05/12/2022     Lab Results   Component Value Date    WBC 11.2 05/13/2022    RBC 3.36 05/13/2022    HGB 9.7 05/13/2022    HCT 31.0 05/13/2022    MCV 92.3 05/13/2022    MCH 28.9 05/13/2022    MCHC 31.3 05/13/2022    RDW 15.5 05/13/2022     05/13/2022    MPV 10.2 05/13/2022     Recent Labs     05/12/22 2127   ALKPHOS 88   ALT 24   AST 21   PROT 7.1   BILITOT 0.4   LABALBU 3.9     Lab Results   Component Value Date    MG 1.6 02/03/2021     Lab Results   Component Value Date    PROTIME 19.8 03/07/2022    INR 1.8 03/07/2022     Lab Results   Component Value Date    TSH 0.856 02/03/2021     No components found for: CHLPL  Lab Results   Component Value Date    TRIG 39 01/31/2022    TRIG 108 09/08/2021    TRIG 86 04/15/2020     Lab Results   Component Value Date    HDL 45 05/13/2022    HDL 45 01/31/2022    HDL 38 09/08/2021     Lab Results   Component Value Date    LDLCALC 56 05/13/2022    LDLCALC 67 01/31/2022    LDLCALC 119 (H) 09/08/2021     Chest x-ray, EKG and telemetry independently reviewed  Chest x-ray: Normal cardiac silhouette and no vascular congestion or infiltrates. EKG: Atrial fibrillation with RVR and nonspecific ST changes  High-sensitivity troponin 16, 18    Active Hospital Problems    Diagnosis     Palpitation [R00.2]      Priority: Medium    CKD (chronic kidney disease) [N18.9]      Priority: Medium    HLD (hyperlipidemia) [E78.5]      Priority: Medium    Primary hypertension [I10]      Priority: Medium    Atrial fibrillation with RVR (HCC) [I48.91]     GERD (gastroesophageal reflux disease) [K21.9]     Obesity [E66.9]     Chest pain [R07.9]              ASSESSMENT / PLAN:  1. Palpitations and nonischemic chest pain due to recurrent A. fib with RVR that has converted to sinus rhythm. Restart digoxin 0.125 mg daily and increase metoprolol XL to 200 mg twice daily. Continue Xarelto 20 mg daily. No further inpatient cardiac testing planned.   Ambulate in rizzo and okay for discharge later today    2.  Essential hypertension controlled          Electronically signed by Mitchell Nunez MD on 5/13/2022 at 8:42 AM

## 2022-05-14 LAB
EKG ATRIAL RATE: 63 BPM
EKG P AXIS: 60 DEGREES
EKG P-R INTERVAL: 186 MS
EKG Q-T INTERVAL: 436 MS
EKG QRS DURATION: 72 MS
EKG QTC CALCULATION (BAZETT): 446 MS
EKG R AXIS: 20 DEGREES
EKG T AXIS: 39 DEGREES
EKG VENTRICULAR RATE: 63 BPM

## 2022-05-14 PROCEDURE — 93010 ELECTROCARDIOGRAM REPORT: CPT | Performed by: INTERNAL MEDICINE

## 2022-05-25 NOTE — DISCHARGE SUMMARY
[facility]   Discharge summary   Patient ID:  Romaine Gregory  [id number]  83 y.o.  1955    Admit date: 5/12/2022    Discharge date and time: 5/13/2022    Admission Diagnoses:   Patient Active Problem List   Diagnosis    Abnormal EKG    Chest pain    Dyspnea    Nausea    GERD (gastroesophageal reflux disease)    Obesity    Atrial fibrillation with RVR (HCC)    Multiple sclerosis (HCC)    Acute renal failure (ARF) (HCC)    CKD (chronic kidney disease)    HLD (hyperlipidemia)    Primary hypertension    Palpitation       Discharge Diagnoses: afib/RVR    Consults: cardiology    Procedures: None    Hospital Course: The patient is a 69 y.o. female of Phil Navarrete MD     58-year-old  woman with known history of atrial fibrillation, multiple sclerosis, hypertension hyperlipidemia presents to the emergency department with palpitations-found to be in A. fib with rapid ventricular rate.     Admit to telemetry-   oral rate control, Toprol- mg twice daily, digoxin 125 daily  Continue oral anticoagulation with Xarelto-ensure compliance  Cardiology evaluation for titration and negative chronotropic's  Investigate underlying etiology of A. fib RVR  Mild elevation in BNP at 1500  Leukocytosis likely reactive 12.8-check UA, chest x-ray negative for acute process  Consideration for DCCV-defer to cardiology  Keep n.p.o. until above finalized    Patient converted into NSR and remained in NSR. Patient was discharged home in stable conditions and with medications listed below. Patient to follow up with cardiology in two weeks. No results for input(s): WBC, HGB, HCT, PLT in the last 72 hours. No results for input(s): NA, K, CL, CO2, BUN, CREATININE, GLU, CALCIUM in the last 72 hours.     XR CHEST PORTABLE    Result Date: 5/12/2022  EXAMINATION: ONE XRAY VIEW OF THE CHEST 5/12/2022 9:23 pm COMPARISON: 03/07/2022 HISTORY: ORDERING SYSTEM PROVIDED HISTORY: sob TECHNOLOGIST PROVIDED HISTORY: Reason for exam:->sob What reading provider will be dictating this exam?->CRC FINDINGS: The lungs are without acute focal process. There is no effusion or pneumothorax. Stable heart size. The osseous structures are without acute process. No acute process. Discharge Exam:    HEENT: NCAT,  PERRLA, No JVD  Heart:  RRR, no murmurs, gallops, or rubs. Lungs:  CTA bilaterally, no wheeze, rales or rhonchi  Abd: bowel sounds present, nontender, nondistended, no masses  Extrem:  No clubbing, cyanosis, or edema    Disposition: home     Patient Condition at Discharge: Stable    Patient Instructions:      Medication List      CHANGE how you take these medications    metoprolol succinate 200 MG extended release tablet  Commonly known as: TOPROL XL  Take 1 tablet by mouth 2 times daily  What changed:   · medication strength  · how much to take        CONTINUE taking these medications    atorvastatin 10 MG tablet  Commonly known as: LIPITOR     digoxin 125 MCG tablet  Commonly known as: LANOXIN  Take 1 tablet by mouth daily     hydroCHLOROthiazide 25 MG tablet  Commonly known as: HYDRODIURIL     lactobacillus Caps capsule  Take 1 capsule by mouth daily     losartan 50 mg tablet  Commonly known as: COZAAR     pantoprazole 40 MG tablet  Commonly known as: PROTONIX     potassium chloride 20 MEQ packet  Commonly known as: KLOR-CON     Xarelto 20 MG Tabs tablet  Generic drug: rivaroxaban        STOP taking these medications    Norvasc 5 MG tablet  Generic drug: amLODIPine           Where to Get Your Medications      These medications were sent to [facility] [address] [address]    Phone: [phone]   · metoprolol succinate 200 MG extended release tablet       Activity: activity as tolerated  Diet: cardiac diet    Pt has been advised to: Follow-up with German Inman MD in 1 week.   Follow-up with consultants as recommended by them    Note that over 30 minutes was spent in preparing discharge papers, discussing discharge with patient, medication review, etc.    Signed:  Katerina Aviles MD  5/13/2022

## 2022-08-04 ENCOUNTER — TELEPHONE (OUTPATIENT)
Dept: NEUROLOGY | Age: 67
End: 2022-08-04

## 2022-08-04 DIAGNOSIS — G56.03 BILATERAL CARPAL TUNNEL SYNDROME: Primary | ICD-10-CM

## 2022-08-04 NOTE — TELEPHONE ENCOUNTER
Patient called stating that her left hand is worse, increased numbness/tingling/pain. At her last office visit Dr Igor Frazier said if her hand got worse he would order an EMG. Notified patient that Dr Igor Frazier would be OOT til 8/10/22.  Please order EMG

## 2022-08-10 NOTE — TELEPHONE ENCOUNTER
Spoke with pt and notified her that Dr Laura Cloud ordered an EMG/Nerve conduction studies.  Phone number given to patientt o schedule

## 2022-09-29 ENCOUNTER — HOSPITAL ENCOUNTER (OUTPATIENT)
Dept: NEUROLOGY | Age: 67
Discharge: HOME | End: 2022-09-29
Payer: MEDICARE

## 2022-09-29 PROBLEM — G56.02 CARPAL TUNNEL SYNDROME OF LEFT WRIST: Status: ACTIVE | Noted: 2022-09-29

## 2022-09-29 PROCEDURE — 95910 NRV CNDJ TEST 7-8 STUDIES: CPT | Performed by: PSYCHIATRY & NEUROLOGY

## 2022-09-29 PROCEDURE — 95910 NRV CNDJ TEST 7-8 STUDIES: CPT

## 2022-09-29 NOTE — PROCEDURES
[facility].  Electrodiagnostic Laboratory  [place]        Full Name: Roberta Tinoco Gender: Female  MRN: [id number] YOB: 1955      Visit Date: 9/29/2022 14:14  Age: 69 Years 6 Months Old  Examining Physician: Dr. Costa Males   Referring Physician: Dr. Costa Males   Technician: Xuan Barbosa   Height: 5 feet 5 inch  Weight: 195 lbs; BMI 31.5  Notes: Carpal Tunnel Syndrome (Bilateral)        Motor NCS      Nerve / Sites Lat. Amplitude Amp. 1-2 Distance Lat Diff Velocity Temp.    ms mV % cm ms m/s °C   L Median - APB      Wrist 11.15 0.6 100 8   32.5      Elbow 16.56 0.5 87.3 20 5.42 37 32.5   R Median - APB      Wrist 4.90 10.0 100 8   33      Elbow 9.06 8.7 86.9 20 4.17 48 33.1   L Ulnar - ADM      Wrist 2.81 10.8 100 8   32.9      B. Elbow 6.20 10.2 94.8 17 3.39 50 32.6      A. Elbow 7.81 10.6 98.3 10 1.61 62 32.6       Sensory NCS      Nerve / Sites Onset Lat Peak Lat PP Amp Distance Velocity Temp.    ms ms µV cm m/s °C   L Median - Digit II (Antidromic)      Mid Palm NR NR NR 7 NR 32.9      Wrist NR NR NR 14 NR 32.9   R Median - Digit II (Antidromic)      Mid Palm 1.09 1.98 33.0 7 64 33.3      Wrist 3.07 3.96 28.2 14 46 33.3   L Ulnar - Digit V (Antidromic)      Wrist 3.23 3.91 27.3 14 43 32.7   L Radial - Anatomical snuff box (Forearm)      Forearm 1.77 2.34 30.7 10 56 32.7       F  Wave      Nerve F Lat M Lat F-M Lat    ms ms ms   L Median - APB 42.1 9.4 32.7   L Ulnar - ADM 28.1 2.5 25.6   R Median - APB 29.8 4.8 25.0         Nerve conduction studies in both arms disclosed the following abnormalities---marked delay in the distal motor latency of the left median of the wrist, with profoundly reduced motor amplitude potentials. Motor conduction velocities of that nerve were also decreased. Left median sensory responses were not recorded. The F-wave latency of the left median nerve was markedly delayed.     These findings were compared to the referential values in this laboratory, available upon request.    Nerve conduction studies of both arms disclosed evidence diagnostic of a left median neuropathy at/or distal to the wrist, of marked severity. These findings were consistent with carpal tunnel syndrome. There were no other peripheral neuropathies. Motor radiculopathies or intracanalicular lesions cannot be determined unless needle testing be performed. Sensory radiculopathies cannot be evaluated by electrodiagnostic measures. Clinically, the patient presented with marked tingling sensations in the left hand. She was status post right carpal tunnel release. Her difficulties are related to her severe left carpal tunnel syndrome. Carpal tunnel release was recommended. Clinical correlation was highly advised.

## 2022-09-29 NOTE — PROGRESS NOTES
NCV studies of t he upper extremities were completed per order of Dr. Wisam Austin.  Report will follow  [employer] 9/29/2022

## 2023-02-01 LAB — MAMMOGRAPHY, EXTERNAL: NORMAL

## 2023-02-14 ENCOUNTER — HOSPITAL ENCOUNTER (INPATIENT)
Age: 68
LOS: 2 days | Discharge: HOME | End: 2023-02-16
Attending: EMERGENCY MEDICINE | Admitting: INTERNAL MEDICINE
Payer: MEDICARE

## 2023-02-14 ENCOUNTER — APPOINTMENT (OUTPATIENT)
Dept: GENERAL RADIOLOGY | Age: 68
End: 2023-02-14
Payer: MEDICARE

## 2023-02-14 DIAGNOSIS — R07.9 CHEST PAIN, UNSPECIFIED TYPE: Primary | ICD-10-CM

## 2023-02-14 LAB
ALBUMIN SERPL-MCNC: 4 G/DL (ref 3.5–5.2)
ALP BLD-CCNC: 86 U/L (ref 35–104)
ALT SERPL-CCNC: 14 U/L (ref 0–32)
ANION GAP SERPL CALCULATED.3IONS-SCNC: 10 MMOL/L (ref 7–16)
AST SERPL-CCNC: 15 U/L (ref 0–31)
BASOPHILS ABSOLUTE: 0.08 E9/L (ref 0–0.2)
BASOPHILS RELATIVE PERCENT: 0.5 % (ref 0–2)
BILIRUB SERPL-MCNC: 0.2 MG/DL (ref 0–1.2)
BUN BLDV-MCNC: 27 MG/DL (ref 6–23)
CALCIUM SERPL-MCNC: 8.9 MG/DL (ref 8.6–10.2)
CHLORIDE BLD-SCNC: 107 MMOL/L (ref 98–107)
CO2: 27 MMOL/L (ref 22–29)
CREAT SERPL-MCNC: 1.1 MG/DL (ref 0.5–1)
DIGOXIN LEVEL: 1.1 NG/ML (ref 0.8–2)
EOSINOPHILS ABSOLUTE: 0.36 E9/L (ref 0.05–0.5)
EOSINOPHILS RELATIVE PERCENT: 2.4 % (ref 0–6)
GFR SERPL CREATININE-BSD FRML MDRD: 54 ML/MIN/1.73
GLUCOSE BLD-MCNC: 155 MG/DL (ref 74–99)
HCT VFR BLD CALC: 31.5 % (ref 34–48)
HEMOGLOBIN: 10.2 G/DL (ref 11.5–15.5)
IMMATURE GRANULOCYTES #: 0.14 E9/L
IMMATURE GRANULOCYTES %: 0.9 % (ref 0–5)
INR BLD: 1.6
LYMPHOCYTES ABSOLUTE: 2.29 E9/L (ref 1.5–4)
LYMPHOCYTES RELATIVE PERCENT: 15.2 % (ref 20–42)
MAGNESIUM: 1.8 MG/DL (ref 1.6–2.6)
MCH RBC QN AUTO: 29.3 PG (ref 26–35)
MCHC RBC AUTO-ENTMCNC: 32.4 % (ref 32–34.5)
MCV RBC AUTO: 90.5 FL (ref 80–99.9)
MONOCYTES ABSOLUTE: 1.09 E9/L (ref 0.1–0.95)
MONOCYTES RELATIVE PERCENT: 7.2 % (ref 2–12)
NEUTROPHILS ABSOLUTE: 11.12 E9/L (ref 1.8–7.3)
NEUTROPHILS RELATIVE PERCENT: 73.8 % (ref 43–80)
PDW BLD-RTO: 14.6 FL (ref 11.5–15)
PLATELET # BLD: 272 E9/L (ref 130–450)
PMV BLD AUTO: 10.4 FL (ref 7–12)
POTASSIUM SERPL-SCNC: 4 MMOL/L (ref 3.5–5)
PRO-BNP: 603 PG/ML (ref 0–125)
PROTHROMBIN TIME: 18 SEC (ref 9.3–12.4)
RBC # BLD: 3.48 E12/L (ref 3.5–5.5)
SODIUM BLD-SCNC: 144 MMOL/L (ref 132–146)
TOTAL PROTEIN: 7.4 G/DL (ref 6.4–8.3)
TROPONIN, HIGH SENSITIVITY: 16 NG/L (ref 0–9)
TROPONIN, HIGH SENSITIVITY: 16 NG/L (ref 0–9)
WBC # BLD: 15.1 E9/L (ref 4.5–11.5)

## 2023-02-14 PROCEDURE — 1200000000 HC SEMI PRIVATE

## 2023-02-14 PROCEDURE — 71046 X-RAY EXAM CHEST 2 VIEWS: CPT

## 2023-02-14 PROCEDURE — 84484 ASSAY OF TROPONIN QUANT: CPT

## 2023-02-14 PROCEDURE — 93005 ELECTROCARDIOGRAM TRACING: CPT | Performed by: PHYSICIAN ASSISTANT

## 2023-02-14 PROCEDURE — 83880 ASSAY OF NATRIURETIC PEPTIDE: CPT

## 2023-02-14 PROCEDURE — 99285 EMERGENCY DEPT VISIT HI MDM: CPT

## 2023-02-14 PROCEDURE — 85610 PROTHROMBIN TIME: CPT

## 2023-02-14 PROCEDURE — 2580000003 HC RX 258: Performed by: FAMILY MEDICINE

## 2023-02-14 PROCEDURE — 85025 COMPLETE CBC W/AUTO DIFF WBC: CPT

## 2023-02-14 PROCEDURE — 36415 COLL VENOUS BLD VENIPUNCTURE: CPT

## 2023-02-14 PROCEDURE — 6370000000 HC RX 637 (ALT 250 FOR IP)

## 2023-02-14 PROCEDURE — 80053 COMPREHEN METABOLIC PANEL: CPT

## 2023-02-14 PROCEDURE — 80162 ASSAY OF DIGOXIN TOTAL: CPT

## 2023-02-14 PROCEDURE — 83735 ASSAY OF MAGNESIUM: CPT

## 2023-02-14 RX ORDER — SODIUM CHLORIDE 9 MG/ML
INJECTION, SOLUTION INTRAVENOUS PRN
Status: DISCONTINUED | OUTPATIENT
Start: 2023-02-14 | End: 2023-02-16 | Stop reason: HOSPADM

## 2023-02-14 RX ORDER — ACETAMINOPHEN 650 MG/1
650 SUPPOSITORY RECTAL EVERY 6 HOURS PRN
Status: DISCONTINUED | OUTPATIENT
Start: 2023-02-14 | End: 2023-02-16 | Stop reason: HOSPADM

## 2023-02-14 RX ORDER — ACETAMINOPHEN 325 MG/1
650 TABLET ORAL EVERY 6 HOURS PRN
Status: DISCONTINUED | OUTPATIENT
Start: 2023-02-14 | End: 2023-02-16 | Stop reason: HOSPADM

## 2023-02-14 RX ORDER — LACTOBACILLUS RHAMNOSUS GG 10B CELL
1 CAPSULE ORAL DAILY
Status: DISCONTINUED | OUTPATIENT
Start: 2023-02-14 | End: 2023-02-16 | Stop reason: HOSPADM

## 2023-02-14 RX ORDER — ASPIRIN 81 MG/1
81 TABLET, CHEWABLE ORAL DAILY
Status: DISCONTINUED | OUTPATIENT
Start: 2023-02-15 | End: 2023-02-16 | Stop reason: HOSPADM

## 2023-02-14 RX ORDER — LOSARTAN POTASSIUM 50 MG/1
50 TABLET ORAL DAILY
Status: DISCONTINUED | OUTPATIENT
Start: 2023-02-14 | End: 2023-02-15

## 2023-02-14 RX ORDER — ONDANSETRON 4 MG/1
4 TABLET, ORALLY DISINTEGRATING ORAL EVERY 8 HOURS PRN
Status: DISCONTINUED | OUTPATIENT
Start: 2023-02-14 | End: 2023-02-16 | Stop reason: HOSPADM

## 2023-02-14 RX ORDER — ATORVASTATIN CALCIUM 10 MG/1
10 TABLET, FILM COATED ORAL DAILY
Status: DISCONTINUED | OUTPATIENT
Start: 2023-02-14 | End: 2023-02-16 | Stop reason: HOSPADM

## 2023-02-14 RX ORDER — SODIUM CHLORIDE 0.9 % (FLUSH) 0.9 %
10 SYRINGE (ML) INJECTION PRN
Status: DISCONTINUED | OUTPATIENT
Start: 2023-02-14 | End: 2023-02-16 | Stop reason: HOSPADM

## 2023-02-14 RX ORDER — ONDANSETRON 2 MG/ML
4 INJECTION INTRAMUSCULAR; INTRAVENOUS EVERY 6 HOURS PRN
Status: DISCONTINUED | OUTPATIENT
Start: 2023-02-14 | End: 2023-02-16 | Stop reason: HOSPADM

## 2023-02-14 RX ORDER — SODIUM CHLORIDE 0.9 % (FLUSH) 0.9 %
5-40 SYRINGE (ML) INJECTION EVERY 12 HOURS SCHEDULED
Status: DISCONTINUED | OUTPATIENT
Start: 2023-02-14 | End: 2023-02-16 | Stop reason: HOSPADM

## 2023-02-14 RX ORDER — METOPROLOL SUCCINATE 100 MG/1
200 TABLET, EXTENDED RELEASE ORAL 2 TIMES DAILY
Status: DISCONTINUED | OUTPATIENT
Start: 2023-02-14 | End: 2023-02-16 | Stop reason: HOSPADM

## 2023-02-14 RX ORDER — NITROGLYCERIN 0.4 MG/1
0.4 TABLET SUBLINGUAL ONCE
Status: COMPLETED | OUTPATIENT
Start: 2023-02-14 | End: 2023-02-14

## 2023-02-14 RX ADMIN — ASPIRIN 325 MG: 325 TABLET, COATED ORAL at 16:51

## 2023-02-14 RX ADMIN — NITROGLYCERIN 0.4 MG: 0.4 TABLET, ORALLY DISINTEGRATING SUBLINGUAL at 16:51

## 2023-02-14 RX ADMIN — SODIUM CHLORIDE, PRESERVATIVE FREE 10 ML: 5 INJECTION INTRAVENOUS at 20:00

## 2023-02-14 ASSESSMENT — LIFESTYLE VARIABLES
HOW MANY STANDARD DRINKS CONTAINING ALCOHOL DO YOU HAVE ON A TYPICAL DAY: PATIENT DOES NOT DRINK
HOW OFTEN DO YOU HAVE A DRINK CONTAINING ALCOHOL: NEVER

## 2023-02-14 NOTE — ED PROVIDER NOTES
[address] [facility] ENCOUNTER        Pt Name: Deepika Duenas  MRN: [id number]  [place] 1955  Date of evaluation: 2023  Provider: Peyton Guillaume MD  PCP: Edna Concepcion MD  Note Started: 6:40 PM EST 23    CHIEF COMPLAINT       Chief Complaint   Patient presents with    Chest Pain     Chest pain/pressure since yesterday. Hx AFIB    Fatigue    Shortness of Breath       HISTORY OF PRESENT ILLNESS: 1 or more Elements   History From: Patient    Limitations to history : None    Deepika Duenas is a 77 y.o. female with medical history of hypertension hyperlipidemia, A-fib (on Xarelto cardioversions in the past) who presents with chest pain. Patient states chest pain started this morning and has been progressively worsening since then. States that the pain feels like a pressure in the left chest wall radiating towards her jaw. States that the pain has been significantly worsening since noon. Describes symptoms moderate in severity with no alleviating or exacerbating factors. Denies any fever, chills, n/v, headache, dizziness, vision changes, neck tenderness or stiffness, weakness, palpitations, sob, cough, abd pain, dysuria, hematuria, diarrhea, constipation, bloody stools. Nursing Notes were all reviewed and agreed with or any disagreements were addressed in the HPI.    ROS:   Pertinent positives and negatives are stated within HPI, all other systems reviewed and are negative.    --------------------------------------------- PAST HISTORY ---------------------------------------------  Past Medical History:  has a past medical history of A-fib ([facility] [address] [facility] HLD (hyperlipidemia), HTN (hypertension), and MS [facility]multiple sclerosis) [facility] Past Surgical History:  has a past surgical history that includes  section (); joint replacement (Bilateral, [id number]);  Cholecystectomy; Cardioversion (03/08/2021); and Cardioversion (10/13/2021). Social History:  reports that she has never smoked. She has never used smokeless tobacco. She reports that she does not currently use alcohol. She reports that she does not currently use drugs. Family History: family history includes Atrial Fibrillation in her father; Stroke in her mother. The patients home medications have been reviewed. Allergies: Patient has no known allergies. ---------------------------------------------------PHYSICAL EXAM--------------------------------------        Constitutional/General: Alert and oriented x3, well appearing, non toxic in NAD  Head: Normocephalic and atraumatic  Mouth: Oropharynx clear, handling secretions, no trismus  Neck: Supple, full ROM,  Pulmonary: Lungs clear to auscultation bilaterally, no wheezes, rales, or rhonchi. Not in respiratory distress  Cardiovascular:  Regular rate. Regular rhythm. No murmurs  Chest: no chest wall tenderness  Abdomen: Soft. Non tender. Non distended. No rebound, guarding, or rigidity. No pulsatile masses appreciated. Musculoskeletal: Moves all extremities x 4. Neurovascularly intact. Warm and well perfused, no clubbing, cyanosis, or edema. Compartments are soft and compressible. Capillary refill <3 seconds. Skin: warm and dry. No rashes. Neurologic: GCS 15, no gross focal neurologic deficits  Psych: Normal Affect    -------------------------------------------------- RESULTS -------------------------------------------------  I have personally reviewed all laboratory and imaging results for this patient. Results are listed below.      LABS:  Results for orders placed or performed during the hospital encounter of 02/14/23   CBC with Auto Differential   Result Value Ref Range    WBC 15.1 (H) 4.5 - 11.5 E9/L    RBC 3.48 (L) 3.50 - 5.50 E12/L    Hemoglobin 10.2 (L) 11.5 - 15.5 g/dL    Hematocrit 31.5 (L) 34.0 - 48.0 %    MCV 90.5 80.0 - 99.9 fL    MCH 29.3 26.0 - 35.0 pg MCHC 32.4 32.0 - 34.5 %    RDW 14.6 11.5 - 15.0 fL    Platelets 226 688 - 647 E9/L    MPV 10.4 7.0 - 12.0 fL    Neutrophils % 73.8 43.0 - 80.0 %    Immature Granulocytes % 0.9 0.0 - 5.0 %    Lymphocytes % 15.2 (L) 20.0 - 42.0 %    Monocytes % 7.2 2.0 - 12.0 %    Eosinophils % 2.4 0.0 - 6.0 %    Basophils % 0.5 0.0 - 2.0 %    Neutrophils Absolute 11.12 (H) 1.80 - 7.30 E9/L    Immature Granulocytes # 0.14 E9/L    Lymphocytes Absolute 2.29 1.50 - 4.00 E9/L    Monocytes Absolute 1.09 (H) 0.10 - 0.95 E9/L    Eosinophils Absolute 0.36 0.05 - 0.50 E9/L    Basophils Absolute 0.08 0.00 - 0.20 E9/L   Comprehensive Metabolic Panel   Result Value Ref Range    Sodium 144 132 - 146 mmol/L    Potassium 4.0 3.5 - 5.0 mmol/L    Chloride 107 98 - 107 mmol/L    CO2 27 22 - 29 mmol/L    Anion Gap 10 7 - 16 mmol/L    Glucose 155 (H) 74 - 99 mg/dL    BUN 27 (H) 6 - 23 mg/dL    Creatinine 1.1 (H) 0.5 - 1.0 mg/dL    Est, Glom Filt Rate 54 >=60 mL/min/1.73    Calcium 8.9 8.6 - 10.2 mg/dL    Total Protein 7.4 6.4 - 8.3 g/dL    Albumin 4.0 3.5 - 5.2 g/dL    Total Bilirubin 0.2 0.0 - 1.2 mg/dL    Alkaline Phosphatase 86 35 - 104 U/L    ALT 14 0 - 32 U/L    AST 15 0 - 31 U/L   Troponin   Result Value Ref Range    Troponin, High Sensitivity 16 (H) 0 - 9 ng/L   Brain Natriuretic Peptide   Result Value Ref Range    Pro- (H) 0 - 125 pg/mL   Magnesium   Result Value Ref Range    Magnesium 1.8 1.6 - 2.6 mg/dL   Protime-INR   Result Value Ref Range    Protime 18.0 (H) 9.3 - 12.4 sec    INR 1.6    Digoxin Level   Result Value Ref Range    Digoxin Lvl 1.1 0.8 - 2.0 ng/mL   Troponin   Result Value Ref Range    Troponin, High Sensitivity 16 (H) 0 - 9 ng/L   EKG 12 Lead   Result Value Ref Range    Ventricular Rate 54 BPM    Atrial Rate 54 BPM    P-R Interval 166 ms    QRS Duration 84 ms    Q-T Interval 430 ms    QTc Calculation (Bazett) 407 ms    P Axis 79 degrees    R Axis 28 degrees    T Axis 47 degrees       RADIOLOGY:   Non-plain film images such as CT, Ultrasound and MRI are read by the radiologist. Plain radiographic images are visualized and preliminarily interpreted by the ED Provider with the below findings:    CXR with no obvious effusions or consolidations concerning pna, no ptx       Interpretation per the Radiologist below, if available at the time of this note:    XR CHEST (2 VW)   Final Result   No acute process. Cardiomegaly. XR CHEST (2 VW)    Result Date: 2/14/2023  EXAMINATION: TWO XRAY VIEWS OF THE CHEST 2/14/2023 4:09 pm COMPARISON: None. HISTORY: ORDERING SYSTEM PROVIDED HISTORY:  left sided TECHNOLOGIST PROVIDED HISTORY: Reason for exam:->CP left sided What reading provider will be dictating this exam?->CRC FINDINGS: The lungs are without acute focal process. There is no effusion or pneumothorax. The cardiomediastinal silhouette is without acute process. The osseous structures are without acute process. Old right mid rib fracture. No acute process. Cardiomegaly. No results found. Procedures:      ------------------------- NURSING NOTES AND VITALS REVIEWED ---------------------------   The nursing notes within the ED encounter and vital signs as below have been reviewed by myself and ED attending.   /61   Pulse 51   Temp 98.1 °F (36.7 °C) (Temporal)   Resp 15   Wt 195 lb (88.5 kg)   SpO2 97%   BMI 31.47 kg/m²   Oxygen Saturation Interpretation: Normal    The patients available past medical records and past encounters were reviewed by myself and ED attending        ------------------------------ ED COURSE/MEDICAL DECISION MAKING----------------------    Medical Decision Making/Differential Diagnosis:    CC/HPI Summary, Pertinent Physical Exam Findings, Social Determinants of health, Records Reviewed, DDx, testing done/not done, ED Course, Reassessment, disposition considerations/shared decision making with patient, consults, disposition:      Medical Decision Making/ED COURSE:    Chronic Conditions affecting care:    has a past medical history of A-fib ([facility] [address] (02/03/2021), Diabetes mellitus ([facility] [address] HLD (hyperlipidemia) (5/12/2022), HTN (hypertension), and MS (multiple sclerosis) ([facility] [address] 77 y.o. female with medical history of hypertension hyperlipidemia, A-fib (on Xarelto cardioversions in the past) who presents with chest pain. Myself and ED attending interpreted findings during patient's stay. Vital signs /61   Pulse 51   Temp 98.1 °F (36.7 °C) (Temporal)   Resp 15   Wt 195 lb (88.5 kg)   SpO2 97%   BMI 31.47 kg/m²   Patient was placed on the cardiac monitor. Rhythm - sinus andi  While in the ED patient was hemodynamically stable, afebrile, nontoxic-appearing, in no respiratory distress. Physical exam unremakarble. Working diagnoses include but not limited to ACS, pneumonia, pulmonary embolism, AAA, aortic dissection, costochondritis, pleurisy, pericardial effusion and pericarditis. Labs interpreted by me CBC with  leukocytosis, anemia,   CMP with stable renal and liver function, no electrolyte derangement. BNP elevated. Troponin 16 with delta zero. Mag 1.8. Dig level 1.1  CXR with no effusions or consolidations, no ptx   EKG interpreted by me sinus andi with no sign of acute ischemia. Patient administered ASA Oral and nitro Oral.  On reevaluation patient had some symptomatic relief. Consultation with Hospitalist .  The patient will be admitted for further treatment and evaluation for   1. Chest pain, unspecified type      IP CONSULT TO HOSPITALIST      Counseling: The emergency provider has spoken with the patient and discussed todays results, in addition to providing specific details for the plan of care and counseling regarding the diagnosis and prognosis. Questions are answered at this time and they are agreeable with the plan. ED Course as of 02/14/23 1849   Tue Feb 14, 2023   1620 EKG: This EKG is signed by emergency department physician.     Rate: 47  Qtc: 407ms  Rhythm: sinus andi with PACs  Interpretation: non-specific EKG  Comparison: stable as compared to patient's most recent EKG      [TC]   1838  admitted to Dr. Charito Caal      ED Course User Index  [TC] Nupur Tyson MD       Medications   nitroGLYCERIN (NITROSTAT) SL tablet 0.4 mg (0.4 mg SubLINGual Given 2/14/23 1651)   aspirin EC tablet 325 mg (325 mg Oral Given 2/14/23 1651)       New Prescriptions    No medications on file         Discussion with Other Profesionals : Admitting Team      Social Determinants : None    Records Reviewed : Inpatient Notes Discharged 05/13/2022 with afib RVR , she was transferred to [facility] for cath      CONSULTS: IM       --------------------------------- IMPRESSION AND DISPOSITION ---------------------------------    IMPRESSION  1. Chest pain, unspecified type        DISPOSITION  Disposition: Admit to telemetry  Patient condition is stable        NOTE: This report was transcribed using voice recognition software.  Every effort was made to ensure accuracy; however, inadvertent computerized transcription errors may be present         Nupur Tyson MD  Resident  02/14/23 0505

## 2023-02-14 NOTE — ED NOTES
Department of Emergency Medicine  FIRST PROVIDER TRIAGE NOTE             Independent MLP           2/14/23  3:40 PM EST    Date of Encounter: 2/14/23   MRN: [id number]      HPI: Briana Chow is a 77 y.o. female who presents to the ED for No chief complaint on file. Patient is a 64-year-old stating that since yesterday she started to notice some left-sided chest pain. Patient states drastically much worse than today. Patient states she feels very fatigued. Patient states she has a long cardiac history and is on Xarelto. Patient is a history of being cardioverted. Patient has a history of atrial fibrillation patient sees Dr. Rekha Clements for cardiology. Patient is very short of breath and nauseated    ROS: Negative for abd pain, back pain, fever, cough, diarrhea, or urinary complaints. PE: Gen Appearance/Constitutional: alert  HEENT: NC/NT. PERRLA,  Airway patent. Neck: supple     Initial Plan of Care: All treatment areas with department are currently occupied. Plan to order/Initiate the following while awaiting opening in ED: labs, EKG, and imaging studies.   Initiate Treatment-Testing, Proceed toTreatment Area When Bed Available for ED Attending/MLP to Continue Care    Electronically signed by Stan Ball PA-C   DD: 2/14/23       Stan Ball PA-C  02/14/23 [id number]

## 2023-02-15 LAB
ANION GAP SERPL CALCULATED.3IONS-SCNC: 9 MMOL/L (ref 7–16)
BUN BLDV-MCNC: 28 MG/DL (ref 6–23)
CALCIUM SERPL-MCNC: 9.1 MG/DL (ref 8.6–10.2)
CHLORIDE BLD-SCNC: 106 MMOL/L (ref 98–107)
CHOLESTEROL, TOTAL: 139 MG/DL (ref 0–199)
CO2: 28 MMOL/L (ref 22–29)
CREAT SERPL-MCNC: 1.1 MG/DL (ref 0.5–1)
EKG ATRIAL RATE: 53 BPM
EKG ATRIAL RATE: 54 BPM
EKG P AXIS: 79 DEGREES
EKG P AXIS: 88 DEGREES
EKG P-R INTERVAL: 164 MS
EKG P-R INTERVAL: 166 MS
EKG Q-T INTERVAL: 424 MS
EKG Q-T INTERVAL: 430 MS
EKG QRS DURATION: 84 MS
EKG QRS DURATION: 84 MS
EKG QTC CALCULATION (BAZETT): 397 MS
EKG QTC CALCULATION (BAZETT): 407 MS
EKG R AXIS: 11 DEGREES
EKG R AXIS: 28 DEGREES
EKG T AXIS: 20 DEGREES
EKG T AXIS: 47 DEGREES
EKG VENTRICULAR RATE: 53 BPM
EKG VENTRICULAR RATE: 54 BPM
GFR SERPL CREATININE-BSD FRML MDRD: 54 ML/MIN/1.73
GLUCOSE BLD-MCNC: 116 MG/DL (ref 74–99)
HBA1C MFR BLD: 5.8 % (ref 4–5.6)
HCT VFR BLD CALC: 29.7 % (ref 34–48)
HDLC SERPL-MCNC: 39 MG/DL
HEMOGLOBIN: 9.7 G/DL (ref 11.5–15.5)
LDL CHOLESTEROL CALCULATED: 73 MG/DL (ref 0–99)
MCH RBC QN AUTO: 28.8 PG (ref 26–35)
MCHC RBC AUTO-ENTMCNC: 32.7 % (ref 32–34.5)
MCV RBC AUTO: 88.1 FL (ref 80–99.9)
PDW BLD-RTO: 14.8 FL (ref 11.5–15)
PLATELET # BLD: 270 E9/L (ref 130–450)
PMV BLD AUTO: 10.5 FL (ref 7–12)
POTASSIUM REFLEX MAGNESIUM: 3.9 MMOL/L (ref 3.5–5)
PROCALCITONIN: 0.06 NG/ML (ref 0–0.08)
RBC # BLD: 3.37 E12/L (ref 3.5–5.5)
SODIUM BLD-SCNC: 143 MMOL/L (ref 132–146)
TRIGL SERPL-MCNC: 134 MG/DL (ref 0–149)
TROPONIN, HIGH SENSITIVITY: 17 NG/L (ref 0–9)
VLDLC SERPL CALC-MCNC: 27 MG/DL
WBC # BLD: 13.3 E9/L (ref 4.5–11.5)

## 2023-02-15 PROCEDURE — 1200000000 HC SEMI PRIVATE

## 2023-02-15 PROCEDURE — 93005 ELECTROCARDIOGRAM TRACING: CPT | Performed by: FAMILY MEDICINE

## 2023-02-15 PROCEDURE — 93010 ELECTROCARDIOGRAM REPORT: CPT | Performed by: INTERNAL MEDICINE

## 2023-02-15 PROCEDURE — 84484 ASSAY OF TROPONIN QUANT: CPT

## 2023-02-15 PROCEDURE — 80061 LIPID PANEL: CPT

## 2023-02-15 PROCEDURE — 80048 BASIC METABOLIC PNL TOTAL CA: CPT

## 2023-02-15 PROCEDURE — 85027 COMPLETE CBC AUTOMATED: CPT

## 2023-02-15 PROCEDURE — 36415 COLL VENOUS BLD VENIPUNCTURE: CPT

## 2023-02-15 PROCEDURE — 6370000000 HC RX 637 (ALT 250 FOR IP): Performed by: FAMILY MEDICINE

## 2023-02-15 PROCEDURE — 6370000000 HC RX 637 (ALT 250 FOR IP): Performed by: INTERNAL MEDICINE

## 2023-02-15 PROCEDURE — 2580000003 HC RX 258: Performed by: FAMILY MEDICINE

## 2023-02-15 PROCEDURE — 83036 HEMOGLOBIN GLYCOSYLATED A1C: CPT

## 2023-02-15 PROCEDURE — 84145 PROCALCITONIN (PCT): CPT

## 2023-02-15 PROCEDURE — 6360000002 HC RX W HCPCS: Performed by: FAMILY MEDICINE

## 2023-02-15 RX ORDER — ASPIRIN 81 MG/1
81 TABLET, CHEWABLE ORAL DAILY
Qty: 30 TABLET | Refills: 3 | Status: SHIPPED | OUTPATIENT
Start: 2023-02-16

## 2023-02-15 RX ORDER — AMLODIPINE BESYLATE 5 MG/1
5 TABLET ORAL DAILY
Status: DISCONTINUED | OUTPATIENT
Start: 2023-02-15 | End: 2023-02-16 | Stop reason: HOSPADM

## 2023-02-15 RX ORDER — AMLODIPINE BESYLATE 5 MG/1
5 TABLET ORAL DAILY
Qty: 30 TABLET | Refills: 3 | Status: SHIPPED | OUTPATIENT
Start: 2023-02-15

## 2023-02-15 RX ADMIN — SODIUM CHLORIDE, PRESERVATIVE FREE 10 ML: 5 INJECTION INTRAVENOUS at 07:23

## 2023-02-15 RX ADMIN — LOSARTAN POTASSIUM 50 MG: 50 TABLET, FILM COATED ORAL at 08:57

## 2023-02-15 RX ADMIN — METOPROLOL SUCCINATE 200 MG: 100 TABLET, EXTENDED RELEASE ORAL at 21:24

## 2023-02-15 RX ADMIN — SODIUM CHLORIDE, PRESERVATIVE FREE 10 ML: 5 INJECTION INTRAVENOUS at 21:27

## 2023-02-15 RX ADMIN — SODIUM CHLORIDE, PRESERVATIVE FREE 10 ML: 5 INJECTION INTRAVENOUS at 08:57

## 2023-02-15 RX ADMIN — ATORVASTATIN CALCIUM 10 MG: 10 TABLET, FILM COATED ORAL at 08:57

## 2023-02-15 RX ADMIN — RIVAROXABAN 20 MG: 20 TABLET, FILM COATED ORAL at 17:35

## 2023-02-15 RX ADMIN — AMLODIPINE BESYLATE 5 MG: 5 TABLET ORAL at 17:35

## 2023-02-15 RX ADMIN — ASPIRIN 81 MG CHEWABLE TABLET 81 MG: 81 TABLET CHEWABLE at 08:57

## 2023-02-15 RX ADMIN — ONDANSETRON 4 MG: 2 INJECTION INTRAMUSCULAR; INTRAVENOUS at 07:23

## 2023-02-15 ASSESSMENT — PAIN SCALES - GENERAL
PAINLEVEL_OUTOF10: 0

## 2023-02-15 NOTE — CONSULTS
[facility] [address] [place] [address]    Name: Briana Chow    Age: 77 y.o. Date of Admission: 2023  4:21 PM    Date of Service: 2/15/2023    Reason for Consultation: Intermittent chest symptoms over the last 48 hours. Referring Physician:     History of Present Illness: The patient is a 77y.o. year old female with a known history of paroxysmal atrial fibrillation with prior ablation 2021 and 2021 [facility], maintained on chronic Xarelto 20 mg daily, history of hypertension, hyperlipidemia    She reports being aware of some chest pressure over the last 24 to 48 hours that is not particular exertional related. There is no associated diaphoresis. The discomfort is a pressure add does not really radiate anywhere. She does report she has had a pretty persistent cough over the last couple of months. This admission  hemoglobin 9.7, troponin 16, then 16 then 17. Past Medical History: As above and chronic Xarelto 20 mg daily anticoagulation  Reports a history of thalassemia/Mediterranean related anemia. Personally reviewed old chart echocardiogram 2021 LVEF 60% mild MR mild to moderate TR. Nuclear stress test 2020 no ischemia or infarction LVEF normal 70%. Past surgical history:  section , bilateral orthopedic surgery ,     Social history: Does not smoke      Review of Systems:     Constitutional: No fever, chills, sweats  Cardiac: As per HPI  Pulmonary: No cough, wheeze, hemoptysis  HEENT: No visual disturbances or difficult swallowing  GI: No nausea, vomiting, diarrhea, abdominal pain, rectal bleeding  : No dysuria or hematuria  Endocrine: No excessive thirst, heat or cold intolerance. Musculoskeletal: No joint pain or muscle aches.  No claudication  Skin: No skin breakdown or rashes  Neuro: No headache, confusion, or seizures  Psych: No depression, anxiety      Family History:  Family History   Problem Relation Age of Onset    Stroke Mother     Atrial Fibrillation Father        Social History:  Social History     Socioeconomic History    Marital status:      Spouse name: Not on file    Number of children: Not on file    Years of education: 16    Highest education level: Not on file   Occupational History    Not on file   Tobacco Use    Smoking status: Never    Smokeless tobacco: Never   Substance and Sexual Activity    Alcohol use: Not Currently    Drug use: Not Currently    Sexual activity: Not on file   Other Topics Concern    Not on file   Social History Narrative    Not on file     Social Determinants of Health     Financial Resource Strain: Not on file   Food Insecurity: Not on file   Transportation Needs: Not on file   Physical Activity: Not on file   Stress: Not on file   Social Connections: Not on file   Intimate Partner Violence: Not on file   Housing Stability: Not on file       Allergies:  No Known Allergies    Current Medications:  Current Facility-Administered Medications   Medication Dose Route Frequency Provider Last Rate Last Admin    amLODIPine (NORVASC) tablet 5 mg  5 mg Oral Daily Lily Shafer MD        atorvastatin (LIPITOR) tablet 10 mg  10 mg Oral Daily Arcadio Dearth Learn, APRN - CNP   10 mg at 02/15/23 0857    lactobacillus (CULTURELLE) capsule 1 capsule  1 capsule Oral Daily Arcadio Dearth Learn, APRN - CNP        metoprolol succinate (TOPROL XL) extended release tablet 200 mg  200 mg Oral BID Arcadio Dearth Jaki, APRN - CNP        rivaroxaban (XARELTO) tablet 20 mg  20 mg Oral Dinner Arcadio Deaasad Learn, APRN - CNP        sodium chloride flush 0.9 % injection 5-40 mL  5-40 mL IntraVENous 2 times per day Arcadio Dearth Learn, APRN - CNP   10 mL at 02/15/23 0857    sodium chloride flush 0.9 % injection 10 mL  10 mL IntraVENous PRN Arcadio Dearth Learn, APRN - CNP   10 mL at 02/15/23 0723    0.9 % sodium chloride infusion   IntraVENous PRN Arcadio Dearth Jaki, APRN - CNP        ondansetron (ZOFRAN-ODT) disintegrating tablet 4 mg  4 mg Oral Q8H PRN Denver Mullen Learn, APRN - CNP        Or    ondansetron [facility] injection 4 mg  4 mg IntraVENous Q6H PRN Denver Mullen Learn, APRN - CNP   4 mg at 02/15/23 5998    acetaminophen (TYLENOL) tablet 650 mg  650 mg Oral Q6H PRN Jani Mullen Learn, APRN - CNP        Or    acetaminophen (TYLENOL) suppository 650 mg  650 mg Rectal Q6H PRN Jani Mullen Learn, APRN - CNP        magnesium hydroxide (MILK OF MAGNESIA) 400 MG/5ML suspension 30 mL  30 mL Oral Daily PRN Denver Mullen Learn, APRN - CNP        aspirin chewable tablet 81 mg  81 mg Oral Daily Denver Mullen Learn, APRN - CNP   81 mg at 02/15/23 0857      sodium chloride         Physical Exam:  BP (!) 137/57   Pulse 65   Temp 97.6 °F (36.4 °C) (Temporal)   Resp 18   Ht 5' 7\" (1.702 m)   Wt 195 lb (88.5 kg)   SpO2 98%   BMI 30.54 kg/m²   Weight change: Wt Readings from Last 3 Encounters:   02/15/23 195 lb (88.5 kg)   05/12/22 195 lb (88.5 kg)   03/23/22 196 lb (88.9 kg)         General: Awake, alert, oriented x3, no acute distress  HEENT: Unremarkable  Neck: No JVD or bruits. Cardiac: Regular rate and rhythm, normal S1 and S2, no extra heart sounds, murmurs, heaves, thrills  Resp: Lungs clear without wheezing or crackles. No accessory muscle use or retraction  Abdomen: soft, nontender, nondistended, no gross organomegaly or mass  Skin: Warm and dry, no cyanosis. Musculoskeletal: normal tone and strength in the upper and lower extremities bilaterally  Neuro: Grossly unremarkable  Psych: Cooperative, and normal affect    Intake/Output:    Intake/Output Summary (Last 24 hours) at 2/15/2023 1420  Last data filed at 2/14/2023 2000  Gross per 24 hour   Intake 480 ml   Output --   Net 480 ml     No intake/output data recorded.     Laboratory Tests:  Lab Results   Component Value Date    CREATININE 1.1 (H) 02/15/2023    BUN 28 (H) 02/15/2023     02/15/2023    K 3.9 02/15/2023    CL 106 02/15/2023    CO2 28 02/15/2023     No results for input(s): CKTOTAL, CKMB in the last 72 hours. Invalid input(s): TROPONONI  No results found for: BNP  Lab Results   Component Value Date/Time    WBC 13.3 02/15/2023 04:46 AM    RBC 3.37 02/15/2023 04:46 AM    HGB 9.7 02/15/2023 04:46 AM    HCT 29.7 02/15/2023 04:46 AM    MCV 88.1 02/15/2023 04:46 AM    MCH 28.8 02/15/2023 04:46 AM    MCHC 32.7 02/15/2023 04:46 AM    RDW 14.8 02/15/2023 04:46 AM     02/15/2023 04:46 AM    MPV 10.5 02/15/2023 04:46 AM     Recent Labs     02/14/23  1601   ALKPHOS 86   ALT 14   AST 15   PROT 7.4   BILITOT 0.2   LABALBU 4.0     Lab Results   Component Value Date/Time    MG 1.8 02/14/2023 04:01 PM     Lab Results   Component Value Date/Time    PROTIME 18.0 02/14/2023 04:01 PM    INR 1.6 02/14/2023 04:01 PM     Lab Results   Component Value Date/Time    TSH 0.856 02/03/2021 03:05 PM     No components found for: CHLPL  Lab Results   Component Value Date    TRIG 134 02/15/2023    TRIG 39 01/31/2022    TRIG 108 09/08/2021     Lab Results   Component Value Date    HDL 39 02/15/2023    HDL 45 05/13/2022    HDL 45 01/31/2022     Lab Results   Component Value Date    LDLCALC 73 02/15/2023    LDLCALC 56 05/13/2022    LDLCALC 67 01/31/2022     Lab Results   Component Value Date    INR 1.6 02/14/2023       Admission ECG February 14, 2023 at 1603 and follow-up EKG ordered this morning February 15, 2023 at 7:27 AM personally reviewed by me revealed normal sinus rhythm both poor R wave progression no ischemic change and no significant change from prior EKG May 12, 2022. Personally reviewed her telemetry and it shows normal sinus rhythm heart rate in the 50s and 60s. ASSESSMENT / PLAN:    1. Chest pressure but is not particularly exertion related and could be musculoskeletal in etiology as she reports has been coughing a lot over the last couple of months. Troponins no significant pathologic delta was 16 then 16 then 17.   EKG yesterday today without ischemic change. Would add aspirin 81 mg daily to her medical regimen. Continue current metoprolol succinate 200 mg twice a day, atorvastatin 10 mg daily and already anticoagulated on Xarelto 20 mg daily. Consider outpatient Lexiscan stress test.  Can be discharged to home today from cardiology viewpoint and plan outpatient follow-up [facility] cardiology in a couple of weeks. #2 paroxysmal atrial fibrillation and status post ablation [facility] June 2021 and December 2021. Occasional rare palpitations. Continue Xarelto 20 mg daily well-tolerated. Continue metoprolol succinate 200 mg twice a day. Stop digoxin with current bradycardia. #3 bradycardia heart rate usually in the 50s and will continue current dose of metoprolol succinate 200 mg twice a day. No reported syncope or near syncope. #4 chronic cough and potentially causing some degree of musculoskeletal discomfort now. Would stop losartan and in place start amlodipine 5 mg daily. #5 chronic Xarelto anticoagulation. Reviewed risks and benefits and toxic side effects. Currently denies any bleeding issues. Hemoglobin is 9.7 and no overt bleeding. Asked her to avoid nonsteroidals long-term. Today hemoglobin 9.7 platelet count stable 270,000. Can be discharged to home today from cardiology viewpoint and be seen back [facility] cardiology in 2 to 3 weeks.             Electronically signed by Osorio Calderon MD on 2/15/2023 at 2:20 PM

## 2023-02-15 NOTE — ACP (ADVANCE CARE PLANNING)
Advance Care Planning   Healthcare Decision Maker:    Primary Decision Maker: Zaheer Kunz [facility] - [phone]    Click here to complete Healthcare Decision Makers including selection of the Healthcare Decision Maker Relationship (ie \"Primary\").

## 2023-02-15 NOTE — PROGRESS NOTES
Paged Julia Blackmanlist to let her know Cardiology signed off and to see if patient can be discharged.

## 2023-02-15 NOTE — H&P
[facility]  History and Physical      CHIEF COMPLAINT:    Chief Complaint   Patient presents with    Chest Pain     Chest pain/pressure since yesterday. Hx AFIB    Fatigue    Shortness of Breath        Patient of Kiel Barrera MD presents with:  Chest pain    History of Present Illness:   Patient is a 75-year-old female with a past medical history of A-fib, diabetes, hyperlipidemia, hypertension, and MS. Patient presented to the ED with complaints of chest pain. Patient does have a cardiac history of A-fib with cardioversions and is currently on Xarelto. Patient's most recent cardioversion was approximately 1 year ago 2/7/2022. Patient states her chest pain started yesterday morning and has progressively worsened since then. Patient describes her pain as a pressure and it radiates towards her jaw. Patient has had multiple stress test in the past.  Patient states the pain is worsening. Patient is alert and oriented on exam.  Patient states her chest pain radiates to her left arm and her jaw. Patient did become short of breath with chest pain. Patient is alert and oriented on exam.  Patient states she does not feel this is an exacerbation of her MS. Patient denies any fever, chills, headache, blurred vision and abdominal pain. Patient does complain of a constant non productive cough. ER work-up revealed slightly elevated BUN/creatinine 27/1.1, , troponin 16> 17, WBC 15.1. Patient was admitted observation for further treatment. Cardiology has been consulted. REVIEW OF SYSTEMS:  Pertinent negatives are above in HPI. 10 point ROS otherwise negative. Past Medical History:   Diagnosis Date    A-fib ([facility] 02/03/2021    Diabetes mellitus ([facility]     HLD (hyperlipidemia) 5/12/2022    HTN (hypertension)     MS (multiple sclerosis) ([facility]          Past Surgical History:   Procedure Laterality Date    CARDIOVERSION  03/08/2021    DR. HINSON SUCCESFUL TO SR, ONE SHOCK 200 JOULES CARDIOVERSION  10/13/2021    Dr Gali Sanchez Bilateral 2014,2017       Medications Prior to Admission:    Medications Prior to Admission: metoprolol succinate (TOPROL XL) 200 MG extended release tablet, Take 1 tablet by mouth 2 times daily  losartan (COZAAR) 50 MG tablet, Take 50 mg by mouth daily  atorvastatin (LIPITOR) 10 MG tablet, Take 10 mg by mouth daily  pantoprazole (PROTONIX) 40 MG tablet, Take 40 mg by mouth daily (Patient not taking: Reported on 5/13/2022)  hydroCHLOROthiazide (HYDRODIURIL) 25 MG tablet, Take 25 mg by mouth daily (Patient not taking: Reported on 5/13/2022)  potassium chloride (KLOR-CON) 20 MEQ packet, Take 20 mEq by mouth daily  rivaroxaban (XARELTO) 20 MG TABS tablet, Take 20 mg by mouth Daily with supper   lactobacillus (CULTURELLE) CAPS capsule, Take 1 capsule by mouth daily  digoxin (LANOXIN) 125 MCG tablet, Take 1 tablet by mouth daily (Patient not taking: Reported on 5/13/2022)    Note that the patient's home medications were reviewed and the above list is accurate to the best of my knowledge at the time of the exam.    Allergies:    Patient has no known allergies. Social History:    reports that she has never smoked. She has never used smokeless tobacco. She reports that she does not currently use alcohol. She reports that she does not currently use drugs. Family History:   family history includes Atrial Fibrillation in her father; Stroke in her mother.       PHYSICAL EXAM:    Vitals:  BP (!) 141/60   Pulse 56   Temp 97.7 °F (36.5 °C) (Oral)   Resp 20   Ht 5' 7\" (1.702 m)   Wt 195 lb (88.5 kg)   SpO2 98%   BMI 30.54 kg/m²       General appearance: NAD, conversant, obese  Eyes: Sclerae anicteric, PERRLA  HEENT: AT/NC, MMM  Neck: FROM, supple, no thyromegaly  Lymph: No cervical / supraclavicular lymphadenopathy  Lungs: Clear to auscultation, WOB normal  CV: RRR, no MRGs, no lower extremity edema  Abdomen: Soft, non-tender; no masses or HSM, +BS  Extremities: FROM without synovitis. No clubbing or cyanosis of the hands. Skin: no rash, induration, lesions, or ulcers  Psych: Calm and cooperative. Normal judgement and insight. Normal mood and affect. Neuro: Alert and interactive, face symmetric, speech fluent. LABS:  All labs reviewed. Of note:  CBC with Differential:    Lab Results   Component Value Date/Time    WBC 13.3 02/15/2023 04:46 AM    RBC 3.37 02/15/2023 04:46 AM    HGB 9.7 02/15/2023 04:46 AM    HCT 29.7 02/15/2023 04:46 AM     02/15/2023 04:46 AM    MCV 88.1 02/15/2023 04:46 AM    MCH 28.8 02/15/2023 04:46 AM    MCHC 32.7 02/15/2023 04:46 AM    RDW 14.8 02/15/2023 04:46 AM    BLASTSPCT 2.6 03/07/2022 07:41 PM    METASPCT 0.9 03/30/2019 12:15 PM    LYMPHOPCT 15.2 02/14/2023 04:01 PM    PROMYELOPCT 0.9 03/07/2022 07:41 PM    MONOPCT 7.2 02/14/2023 04:01 PM    MYELOPCT 0.9 03/07/2022 07:41 PM    BASOPCT 0.5 02/14/2023 04:01 PM    MONOSABS 1.09 02/14/2023 04:01 PM    LYMPHSABS 2.29 02/14/2023 04:01 PM    EOSABS 0.36 02/14/2023 04:01 PM    BASOSABS 0.08 02/14/2023 04:01 PM     CMP:    Lab Results   Component Value Date/Time     02/15/2023 04:46 AM    K 3.9 02/15/2023 04:46 AM     02/15/2023 04:46 AM    CO2 28 02/15/2023 04:46 AM    BUN 28 02/15/2023 04:46 AM    CREATININE 1.1 02/15/2023 04:46 AM    GFRAA 54 05/13/2022 04:28 AM    LABGLOM 54 02/15/2023 04:46 AM    GLUCOSE 116 02/15/2023 04:46 AM    PROT 7.4 02/14/2023 04:01 PM    LABALBU 4.0 02/14/2023 04:01 PM    CALCIUM 9.1 02/15/2023 04:46 AM    BILITOT 0.2 02/14/2023 04:01 PM    ALKPHOS 86 02/14/2023 04:01 PM    AST 15 02/14/2023 04:01 PM    ALT 14 02/14/2023 04:01 PM       Imaging:  CXR: No acute process. Cardiomegaly.     EKG:  I've personally reviewed the patient's EKG:  Sinus bradycardia with PACs    Telemetry:  I've personally reviewed the patient's telemetry:      ASSESSMENT/PLAN:  Principal Problem:    Chest pain  Active Problems: CKD (chronic kidney disease)  Resolved Problems:    * No resolved hospital problems. *    63 year old female with a past medical history of MS, diabetes, and afib on xarelto presents to the Ed with complaints chest pain and is admitted to telemetry unit with    Chest pain  -Cycle cardiac enzymes - 16>17  -Consult cardiology  -Monitor blood pressure adjust medications as needed    CKD  Monitor labs - currently at baseline    Exacerbation of MS  Continue home medications    Medication for other comorbidities continue as appropriate dose adjustment as necessary. DVT prophylaxis  PT OT  Discharge planning  Case discussed with attending and agreed upon plan of care. Code status: Full  Requires inpatient level of care  JUDY Dallas CNP    7:12 AM  2/15/2023     Above note edited to reflect my thoughts   She complains of a persistent cough that has been ongoing, associated with chest pain  Chest x-ray unremarkable  Obtain CT chest rule out pathology prior to discharge  Mild elevation in white count to 13,000, negative procalcitonin  Medications adjusted by cardiology      I personally saw, examined and provided care for the patient. Radiographs, labs and medication list were reviewed by me independently. The case was discussed in detail and plans for care were established. Review of Jaja MAYER CNP, documentation was conducted and revisions were made as appropriate directly by me. I agree with the above documented exam, problem list, and plan of care.      Augusta Pierce MD  4:57 PM  2/15/2023

## 2023-02-15 NOTE — CARE COORDINATION
Spoke with patient she states she lives at home with her , stairs, two steps to enter. Patient stated she does not use a walker or cane, active diver, got prescriptions on her own from [facility]. Patient stated her  would be able to provide transport at discharge, expects to be discharged home with no needs if tests come back normal. PCP is Dr. Zaida Houser    Case Management Assessment  Initial Evaluation    Date/Time of Evaluation: 2/15/2023 10:35 AM  Assessment Completed by: Christin Shahid    If patient is discharged prior to next notation, then this note serves as note for discharge by case management. Patient Name: Fabiana Handy                   YOB: 1955  Diagnosis: Chest pain [R07.9]  Chest pain, unspecified type [R07.9]                   Date / Time: 2/14/2023  4:21 PM    Patient Admission Status: Inpatient   Readmission Risk (Low < 19, Mod (19-27), High > 27): Readmission Risk Score: 12.6    Current PCP: Maria Dolores Jimenez MD  PCP verified by CM? Yes    Chart Reviewed: Yes      History Provided by: Patient  Patient Orientation: Alert and Oriented    Patient Cognition: Alert    Hospitalization in the last 30 days (Readmission):  No    If yes, Readmission Assessment in CM Navigator will be completed. Advance Directives:      Code Status: Full Code   Patient's Primary Decision Maker is: Legal Next of Kin    Primary Decision Maker: [id number] - [phone]    Discharge Planning:    Patient lives with: Spouse/Significant Other Type of Home: House  Primary Care Giver: Self  Patient Support Systems include: Spouse/Significant Other   Current Financial resources:    Current community resources:    Current services prior to admission: None            Current DME:              Type of Home Care services:  None    ADLS  Prior functional level: Independent in ADLs/IADLs  Current functional level:  Independent in ADLs/IADLs    PT AM-PAC:   /24  OT AM-PAC: /24    Family can provide assistance at DC: Yes  Would you like Case Management to discuss the discharge plan with any other family members/significant others, and if so, who? Yes  Plans to Return to Present Housing: Yes  Other Identified Issues/Barriers to RETURNING to current housing: none  Potential Assistance needed at discharge: N/A            Potential DME:    Patient expects to discharge to: [address] [place] [address] for transportation at discharge:      Financial    Payor: Amita Owusu / Plan: MEDICARE PART A AND B / Product Type: *No Product type* /     Does insurance require precert for SNF: No    Potential assistance Purchasing Medications:  no  Meds-to-Beds request: Yes      [facility] [address] [place] [facility]  [address]  Phone: [phone] Fax: [phone]      Notes:    Factors facilitating achievement of predicted outcomes: Family support, cooperative, pleasant    Barriers to discharge: Additional Case Management Notes:. The Plan for Transition of Care is related to the following treatment goals of Chest pain [R07.9]  Chest pain, unspecified type [F54.6]    IF APPLICABLE: The Patient and/or patient representative Jenny Saldana and her family were provided with a choice of provider and agrees with the discharge plan. Freedom of choice list with basic dialogue that supports the patient's individualized plan of care/goals and shares the quality data associated with the providers was provided to:     Patient Representative Name:       The Patient and/or Patient Representative Agree with the Discharge Plan? Note entered by Claudia Sanchez, student social work, Reviewed by EMILY Coe.   Yonathan Randolph [place]  Case Management Department  Ph: [phone] Fax:

## 2023-02-16 ENCOUNTER — APPOINTMENT (OUTPATIENT)
Dept: CT IMAGING | Age: 68
End: 2023-02-16
Payer: MEDICARE

## 2023-02-16 ENCOUNTER — APPOINTMENT (OUTPATIENT)
Dept: NON INVASIVE DIAGNOSTICS | Age: 68
End: 2023-02-16
Payer: MEDICARE

## 2023-02-16 VITALS
DIASTOLIC BLOOD PRESSURE: 65 MMHG | BODY MASS INDEX: 30.61 KG/M2 | TEMPERATURE: 97.6 F | SYSTOLIC BLOOD PRESSURE: 118 MMHG | HEIGHT: 67 IN | RESPIRATION RATE: 20 BRPM | OXYGEN SATURATION: 96 % | HEART RATE: 55 BPM | WEIGHT: 195 LBS

## 2023-02-16 LAB
LV EF: 79 %
LVEF MODALITY: NORMAL

## 2023-02-16 PROCEDURE — 93017 CV STRESS TEST TRACING ONLY: CPT

## 2023-02-16 PROCEDURE — A9500 TC99M SESTAMIBI: HCPCS | Performed by: RADIOLOGY

## 2023-02-16 PROCEDURE — 71270 CT THORAX DX C-/C+: CPT

## 2023-02-16 PROCEDURE — 6360000004 HC RX CONTRAST MEDICATION: Performed by: RADIOLOGY

## 2023-02-16 PROCEDURE — 2580000003 HC RX 258: Performed by: FAMILY MEDICINE

## 2023-02-16 PROCEDURE — 3430000000 HC RX DIAGNOSTIC RADIOPHARMACEUTICAL: Performed by: RADIOLOGY

## 2023-02-16 PROCEDURE — 6360000002 HC RX W HCPCS: Performed by: INTERNAL MEDICINE

## 2023-02-16 PROCEDURE — 6370000000 HC RX 637 (ALT 250 FOR IP): Performed by: INTERNAL MEDICINE

## 2023-02-16 PROCEDURE — 6370000000 HC RX 637 (ALT 250 FOR IP): Performed by: FAMILY MEDICINE

## 2023-02-16 RX ORDER — TECHNETIUM TC-99M SESTAMIBI 1 MG/10ML
12 INJECTION INTRAVENOUS
Status: COMPLETED | OUTPATIENT
Start: 2023-02-16 | End: 2023-02-16

## 2023-02-16 RX ORDER — TECHNETIUM TC-99M SESTAMIBI 1 MG/10ML
30 INJECTION INTRAVENOUS
Status: COMPLETED | OUTPATIENT
Start: 2023-02-16 | End: 2023-02-16

## 2023-02-16 RX ADMIN — ASPIRIN 81 MG CHEWABLE TABLET 81 MG: 81 TABLET CHEWABLE at 08:45

## 2023-02-16 RX ADMIN — Medication 30 MILLICURIE: at 13:58

## 2023-02-16 RX ADMIN — METOPROLOL SUCCINATE 200 MG: 100 TABLET, EXTENDED RELEASE ORAL at 08:45

## 2023-02-16 RX ADMIN — Medication 1 CAPSULE: at 08:45

## 2023-02-16 RX ADMIN — AMLODIPINE BESYLATE 5 MG: 5 TABLET ORAL at 08:45

## 2023-02-16 RX ADMIN — SODIUM CHLORIDE, PRESERVATIVE FREE 5 ML: 5 INJECTION INTRAVENOUS at 09:00

## 2023-02-16 RX ADMIN — REGADENOSON 0.4 MG: 0.08 INJECTION, SOLUTION INTRAVENOUS at 13:38

## 2023-02-16 RX ADMIN — ATORVASTATIN CALCIUM 10 MG: 10 TABLET, FILM COATED ORAL at 08:45

## 2023-02-16 RX ADMIN — Medication 12 MILLICURIE: at 11:56

## 2023-02-16 RX ADMIN — IOPAMIDOL 75 ML: 755 INJECTION, SOLUTION INTRAVENOUS at 07:28

## 2023-02-16 NOTE — PROGRESS NOTES
Nuclear stress test report reviewed shows no ischemia or infarction. LVEF 70%. Low risk stress test.    Patient be discharged today from cardiology viewpoint and be seen back [facility] cardiology in 1 month. Continue current medical regimen.

## 2023-02-16 NOTE — PROGRESS NOTES
[facility]                                Progress note    Subjective: The patient is awake and alert. No acute events overnight. Intermittent chest pain decreased in severity compared to pain in ED     Objective:    /65   Pulse 55   Temp 97.6 °F (36.4 °C) (Oral)   Resp 20   Ht 5' 7\" (1.702 m)   Wt 195 lb (88.5 kg)   SpO2 96%   BMI 30.54 kg/m²     No intake/output data recorded. No intake/output data recorded. General appearance: NAD, conversant, obese   HEENT: AT/NC, MMM  Neck: FROM, supple  Lungs: Clear to auscultation  CV: RRR, no MRGs  Vasc: Radial pulses 2+  Abdomen: Soft, non-tender; no masses or HSM  Extremities: No peripheral edema or digital cyanosis  Skin: no rash, lesions or ulcers  Psych: Alert and oriented to person, place and time  Neuro: Alert and interactive     Recent Labs     02/14/23  1601 02/15/23  0446   WBC 15.1* 13.3*   HGB 10.2* 9.7*   HCT 31.5* 29.7*    270       Recent Labs     02/14/23  1601 02/15/23  0446    143   K 4.0 3.9    106   CO2 27 28   BUN 27* 28*   CREATININE 1.1* 1.1*   CALCIUM 8.9 9.1       Assessment:    Principal Problem:    Chest pain  Active Problems:    CKD (chronic kidney disease)  Resolved Problems:    * No resolved hospital problems. *      Plan:  77year old female with a past medical history of MS, diabetes, and afib on xarelto presents to the Ed with complaints chest pain and is admitted to telemetry unit with     Chest pain  -Cycle cardiac enzymes - 16>17  -Consult cardiology  -Monitor blood pressure adjust medications as needed     CKD  Monitor labs - currently at baseline     Exacerbation of MS  Continue home medications     Medication for other comorbidities continue as appropriate dose adjustment as necessary.     2/16/2023  Cardiac stress test today-if negative patient is medically stable to discharge home  Chest pain improving   Continue pain management   Vital signs stable  Incidental finding-thyroid nodule-to be followed up with PCP outpatient. Code status: Full  Consultants:  Cardiology     DVT Prophylaxis - xarelto   PT/OT  Discharge planning       JUDY Carrera CNP  11:43 AM  2/16/2023     Above note edited to reflect my thoughts     I personally saw, examined and provided care for the patient. Radiographs, labs and medication list were reviewed by me independently. The case was discussed in detail and plans for care were established. Review of Jaja MAYER CNP, documentation was conducted and revisions were made as appropriate directly by me. I agree with the above documented exam, problem list, and plan of care.      Jose Miguel Villaseñor MD  2/16/2023

## 2023-02-16 NOTE — PROGRESS NOTES
[facility] CARDIOLOGY PROGRESS NOTE  [facility]        Subjective: Admitted with intermittent chest symptoms, known history of PAF prior ablation June 2021 and December 2021 [facility] [employer]. Maintained on chronic Xarelto 20 mg daily, chronic hypertension hyperlipidemia. Denies any chest discomfort overnight. Still has somewhat persistent cough. Objective: Medications lab chart and telemetry all reviewed. Patient Vitals for the past 24 hrs:   BP Temp Temp src Pulse Resp SpO2   02/16/23 1129 118/65 97.6 °F (36.4 °C) Oral 55 20 96 %   02/16/23 0819 (!) 122/57 98 °F (36.7 °C) Oral 56 18 98 %   02/15/23 2100 (!) 138/59 98.1 °F (36.7 °C) Oral 62 16 99 %       No intake or output data in the 24 hours ending 02/16/23 1430    Wt Readings from Last 3 Encounters:   02/15/23 195 lb (88.5 kg)   05/12/22 195 lb (88.5 kg)   03/23/22 196 lb (88.9 kg)       Telemetry: Personally reviewed and shows normal sinus rhythm heart rate in the 60s with PACs. Current meds: Scheduled Meds:   amLODIPine  5 mg Oral Daily    atorvastatin  10 mg Oral Daily    lactobacillus  1 capsule Oral Daily    metoprolol succinate  200 mg Oral BID    rivaroxaban  20 mg Oral Dinner    sodium chloride flush  5-40 mL IntraVENous 2 times per day    aspirin  81 mg Oral Daily     Continuous Infusions:   sodium chloride       PRN Meds:.sodium chloride flush, sodium chloride, ondansetron **OR** ondansetron, acetaminophen **OR** acetaminophen, magnesium hydroxide    Allergies: Patient has no known allergies. Labs:   Recent Labs     02/14/23  1601 02/15/23  0446   WBC 15.1* 13.3*   HGB 10.2* 9.7*   HCT 31.5* 29.7*   MCV 90.5 88.1    270       Labs:   Recent Labs     02/14/23  1601 02/15/23  0446    143   K 4.0 3.9    106   CO2 27 28   BUN 27* 28*   CREATININE 1.1* 1.1*       Labs: No results for input(s): CKTOTAL, CKMB, CKMBINDEX, TROPONINI in the last 72 hours.     Labs: No results for input(s): BNP in the last 72 hours. Labs:   Recent Labs     02/15/23  0446   CHOL 139   HDL 39   TRIG 134       Labs:   Recent Labs     02/14/23  1601   PROT 7.4   INR 1.6       Review of systems: No reported significant weight gain or weight loss. no dysuria or frequency, no dizziness, falls or trauma, no change in bowel or bladder habits, no hematochezia, hemoptysis or hematuria. No fevers, chills, nausea or vomiting reported. No significant wheezing or sputum production. No headache or visual changes. The remainder of the 10 review of systems otherwise negative. Exam      General: Patient comfortable in no distress and currently denies any chest pain. HEENT: Face symmetrical and no apparent cranial nerve deficit. Neck: No jugular venous distention, carotid bruit or thyromegaly. Lungs: Clear bilaterally without rales, wheezes or dullness. Cardiac: Regular rate and rhythm, no S3, S4, no rub or gallop. Abdomen: No rebound or guarding, no hepatosplenomegaly. Extremities: Without significant clubbing , cyanosis, or edema. Neuro:  No focal motor or sensory deficit apparent. Skin: No petechiae, no significant bruising. Assessment: See plan below        Plan: #1 chest pressure normal troponins. Occasionally exertional related symptoms. To undergo a Lexiscan stress test today for further restratification evaluate for CAD and ischemia. #2 PAF currently normal sinus rhythm. #3 bradycardia we will stop digoxin but continue metoprolol succinate 200 mg twice a day. #4 chronic cough and will stop losartan and start amlodipine 5 mg daily.  upper limits of normal not significantly elevated. Echocardiogram February 2021 LVEF 60%, mild MR mild to moderate TR. #5 chronic Xarelto anticoagulation no overt bleeding. No falls or stroke symptoms. No apparent contraindication to continue anticoagulation.     If stress test does not demonstrate any significant ischemia she could be discharged to home later this evening from cardiology viewpoint. Follow-up [facility] cardiology 1 month.         Electronically signed by Zion García MD on 2/16/2023 at 2:30 PM

## 2023-02-20 NOTE — PROGRESS NOTES
Physician Progress Note      PATIENT:               Trinity MENDOZA #:                  [id number]  :                       1955  ADMIT DATE:       2023 4:21 PM  [address] DATE:        2023 5:20 PM  RESPONDING  PROVIDER #:        Alejandro Escobar MD        QUERY TEXT:    Stage of Chronic Kidney Disease: Please provide further specificity, if known. Clinical indicators include: bun/creatinine, bnp, bun, creatinine, ckd   (chronic kidney disease, ckd  Options provided:  -- Chronic kidney disease stage 1  -- Chronic kidney disease stage 2  -- Chronic kidney disease stage 3  -- Chronic kidney disease stage 3a  -- Chronic kidney disease stage 3b  -- Chronic kidney disease stage 4  -- Chronic kidney disease stage 5  -- Chronic kidney disease stage 5, requiring dialysis  -- End stage renal disease  -- Other - I will add my own diagnosis  -- Disagree - Not applicable / Not valid  -- Disagree - Clinically Unable to determine / Unknown        PROVIDER RESPONSE TEXT:    The patient has chronic kidney disease stage 3. QUERY TEXT:    Dear Provider,    Patient admitted with CP, noted to have PAF and is maintained on chronic   Xarelto. If possible, please document in progress notes and discharge summary   if you are evaluating and/or treating any of the following:     The medical record reflects the following:  Risk Factors: paroxysmal atrial fibrillation , chronic Xarelto anticoagulation  Clinical Indicators: Documented chronic Xarelto anticoagulation for PAF, PT   18.0/INR 1.6  Treatment: Chronic Xarelto anticoagulation      Thank you,  Hector Lockett RN  Clinical Documentation Improvement  [phone]  Options provided:  -- Secondary hypercoagulable state in a patient with paroxysmal atrial   fibrillation  -- Other - I will add my own diagnosis  -- Disagree - Not applicable / Not valid  -- Disagree - Clinically unable to determine / Unknown  -- Refer to Clinical Documentation Reviewer    PROVIDER RESPONSE TEXT:    This patient has secondary hypercoagulable state in a patient with paroxysmal   atrial fibrillation.     Query created by: Rochelle Seen on 2/16/2023 3:41 PM      Electronically signed by:  Thelma Parrish MD 2/20/2023 9:05 AM

## 2023-02-20 NOTE — DISCHARGE SUMMARY
[facility]   Discharge summary   Patient ID:  Alexandre Christie  [id number]  77 y.o.  1955    Admit date: 2/14/2023    Discharge date and time: 2/16/23    Admission Diagnoses:   Patient Active Problem List   Diagnosis    Abnormal EKG    Chest pain    Dyspnea    Nausea    GERD (gastroesophageal reflux disease)    Obesity    Atrial fibrillation with RVR (HCC)    Multiple sclerosis (HCC)    Acute renal failure (ARF) (HCC)    CKD (chronic kidney disease)    HLD (hyperlipidemia)    Primary hypertension    Palpitation    Carpal tunnel syndrome of left wrist       Discharge Diagnoses: Chest pain     Consults: cardiology    Procedures: NM stress test    Hospital Course:   77year old female with a past medical history of MS, diabetes, and afib on xarelto presents to the Ed with complaints chest pain and is admitted to telemetry unit with     Chest pain  -Cycle cardiac enzymes - 16>17  -Consult cardiology  -Monitor blood pressure adjust medications as needed     CKD  Monitor labs - currently at baseline     Exacerbation of MS  Continue home medications       2/16/2023  -Cardiac stress test today > negative   -Vital signs stable  -Incidental finding-thyroid nodule-to be followed up with PCP outpatient.  -Start taking ASA 81 mg        No results for input(s): WBC, HGB, HCT, PLT in the last 72 hours. No results for input(s): NA, K, CL, CO2, BUN, CREATININE, GLU, CALCIUM in the last 72 hours. XR CHEST (2 VW)    Result Date: 2/14/2023  EXAMINATION: TWO XRAY VIEWS OF THE CHEST 2/14/2023 4:09 pm COMPARISON: None. HISTORY: ORDERING SYSTEM PROVIDED HISTORY: CP left sided TECHNOLOGIST PROVIDED HISTORY: Reason for exam:->CP left sided What reading provider will be dictating this exam?->CRC FINDINGS: The lungs are without acute focal process. There is no effusion or pneumothorax. The cardiomediastinal silhouette is without acute process. The osseous structures are without acute process. Old right mid rib fracture. No acute process. Cardiomegaly. Discharge Exam:    HEENT: NCAT,  PERRLA, No JVD  Heart:  RRR, no murmurs, gallops, or rubs. Lungs:  CTA bilaterally, no wheeze, rales or rhonchi  Abd: bowel sounds present, nontender, nondistended, no masses  Extrem:  No clubbing, cyanosis, or edema    Disposition: home     Patient Condition at Discharge: Stable     Patient Instructions:      Medication List        START taking these medications      aspirin 81 MG chewable tablet  Take 1 tablet by mouth daily            CHANGE how you take these medications      amLODIPine 5 MG tablet  Commonly known as: NORVASC  Take 1 tablet by mouth daily  What changed:   how much to take  how to take this  when to take this            CONTINUE taking these medications      atorvastatin 10 MG tablet  Commonly known as: LIPITOR     lactobacillus Caps capsule  Take 1 capsule by mouth daily     metoprolol succinate 200 MG extended release tablet  Commonly known as: TOPROL XL  Take 1 tablet by mouth 2 times daily     Xarelto 20 MG Tabs tablet  Generic drug: rivaroxaban            STOP taking these medications      digoxin 125 MCG tablet  Commonly known as: LANOXIN     hydroCHLOROthiazide 25 MG tablet  Commonly known as: HYDRODIURIL     losartan 50 MG tablet  Commonly known as: COZAAR     potassium chloride 20 MEQ packet  Commonly known as: KLOR-CON            ASK your doctor about these medications      pantoprazole 40 MG tablet  Commonly known as: PROTONIX               Where to Get Your Medications        [facility] [phone]  [place]      Phone: [phone]   amLODIPine 5 MG tablet  aspirin 81 MG chewable tablet       Activity: activity as tolerated  Diet: regular diet    Pt has been advised to: Follow-up with Emily Verma MD in 1 week.   Follow-up with consultants as recommended by them    Note that over 30 minutes was spent in preparing discharge papers, discussing discharge with patient, medication review, etc.    Signed:  Primo Romero MD  2/16/23  3:01 PM

## 2023-05-03 ENCOUNTER — HOSPITAL ENCOUNTER (OUTPATIENT)
Age: 68
Discharge: HOME | End: 2023-05-05

## 2023-05-03 PROCEDURE — 87081 CULTURE SCREEN ONLY: CPT

## 2023-05-04 LAB — UREASE TISS QL: NORMAL

## 2023-05-15 LAB
BASOPHILS ABSOLUTE: 0.08 /ΜL
BASOPHILS RELATIVE PERCENT: 0.7 %
EOSINOPHILS ABSOLUTE: 0.37 /ΜL
EOSINOPHILS RELATIVE PERCENT: 3.3 %
FERRITIN: 25 NG/ML (ref 9–150)
HCT VFR BLD CALC: 34.6 % (ref 36–46)
HEMOGLOBIN: 10.8 G/DL (ref 12–16)
IRON % SATURATION: 14
IRON: 53
LYMPHOCYTES ABSOLUTE: 2.24 /ΜL
LYMPHOCYTES RELATIVE PERCENT: 19.7 %
MCH RBC QN AUTO: 27.8 PG
MCHC RBC AUTO-ENTMCNC: 31.2 G/DL
MCV RBC AUTO: 89.2 FL
MONOCYTES ABSOLUTE: 0.52 /ΜL
MONOCYTES RELATIVE PERCENT: 4.6 %
NEUTROPHILS ABSOLUTE: 7.97 /ΜL
NEUTROPHILS RELATIVE PERCENT: 70 %
PLATELET # BLD: 333 K/ΜL
PMV BLD AUTO: 8.9 FL
RBC # BLD: 3.88 10^6/ΜL
TOTAL IRON BINDING CAPACITY: 369
TSH SERPL DL<=0.05 MIU/L-ACNC: 0.44 UIU/ML
WBC # BLD: 11.38 10^3/ML

## 2023-06-28 ENCOUNTER — CLINICAL DOCUMENTATION (OUTPATIENT)
Dept: NUTRITION | Age: 68
End: 2023-06-28

## 2023-07-17 ENCOUNTER — HOSPITAL ENCOUNTER (OUTPATIENT)
Dept: CT IMAGING | Age: 68
Discharge: HOME | End: 2023-07-19
Payer: MEDICARE

## 2023-07-17 DIAGNOSIS — H69.83 OTHER SPECIFIED DISORDERS OF EUSTACHIAN TUBE, BILATERAL: ICD-10-CM

## 2023-07-17 DIAGNOSIS — J32.0 CHRONIC MAXILLARY SINUSITIS: ICD-10-CM

## 2023-07-17 PROCEDURE — 70486 CT MAXILLOFACIAL W/O DYE: CPT

## 2023-08-23 ENCOUNTER — HOSPITAL ENCOUNTER (OUTPATIENT)
Age: 68
Discharge: HOME | End: 2023-08-23
Payer: MEDICARE

## 2023-08-23 LAB
ALT SERPL-CCNC: 13 U/L (ref 0–32)
ANION GAP SERPL CALCULATED.3IONS-SCNC: 13 MMOL/L (ref 7–16)
AST SERPL-CCNC: 13 U/L (ref 0–31)
BUN SERPL-MCNC: 24 MG/DL (ref 6–23)
CALCIUM SERPL-MCNC: 9 MG/DL (ref 8.6–10.2)
CHLORIDE SERPL-SCNC: 102 MMOL/L (ref 98–107)
CO2 SERPL-SCNC: 26 MMOL/L (ref 22–29)
CREAT SERPL-MCNC: 1.2 MG/DL (ref 0.5–1)
ERYTHROCYTE [DISTWIDTH] IN BLOOD BY AUTOMATED COUNT: 15.2 % (ref 11.5–15)
GFR SERPL CREATININE-BSD FRML MDRD: 48 ML/MIN/1.73M2
GLUCOSE SERPL-MCNC: 130 MG/DL (ref 74–99)
HBA1C MFR BLD: 5.9 % (ref 4–5.6)
HCT VFR BLD AUTO: 33.5 % (ref 34–48)
HGB BLD-MCNC: 10.5 G/DL (ref 11.5–15.5)
MCH RBC QN AUTO: 29.4 PG (ref 26–35)
MCHC RBC AUTO-ENTMCNC: 31.3 G/DL (ref 32–34.5)
MCV RBC AUTO: 93.8 FL (ref 80–99.9)
PLATELET # BLD AUTO: 258 K/UL (ref 130–450)
PMV BLD AUTO: 10.4 FL (ref 7–12)
POTASSIUM SERPL-SCNC: 3.9 MMOL/L (ref 3.5–5)
RBC # BLD AUTO: 3.57 M/UL (ref 3.5–5.5)
SODIUM SERPL-SCNC: 141 MMOL/L (ref 132–146)
T3FREE SERPL-MCNC: 2.87 PG/ML (ref 2–4.4)
T4 FREE SERPL-MCNC: 1.3 NG/DL (ref 0.9–1.7)
TSH SERPL DL<=0.05 MIU/L-ACNC: 0.9 UIU/ML (ref 0.27–4.2)
WBC OTHER # BLD: 13.6 K/UL (ref 4.5–11.5)

## 2023-08-23 PROCEDURE — 84450 TRANSFERASE (AST) (SGOT): CPT

## 2023-08-23 PROCEDURE — 80048 BASIC METABOLIC PNL TOTAL CA: CPT

## 2023-08-23 PROCEDURE — 84439 ASSAY OF FREE THYROXINE: CPT

## 2023-08-23 PROCEDURE — 85027 COMPLETE CBC AUTOMATED: CPT

## 2023-08-23 PROCEDURE — 84481 FREE ASSAY (FT-3): CPT

## 2023-08-23 PROCEDURE — 83036 HEMOGLOBIN GLYCOSYLATED A1C: CPT

## 2023-08-23 PROCEDURE — 36415 COLL VENOUS BLD VENIPUNCTURE: CPT

## 2023-08-23 PROCEDURE — 84443 ASSAY THYROID STIM HORMONE: CPT

## 2023-08-23 PROCEDURE — 84460 ALANINE AMINO (ALT) (SGPT): CPT

## 2023-09-27 ENCOUNTER — TELEPHONE (OUTPATIENT)
Dept: CARDIAC CATH/INVASIVE PROCEDURES | Age: 68
End: 2023-09-27

## 2023-09-28 ENCOUNTER — ANESTHESIA (OUTPATIENT)
Dept: CARDIAC CATH/INVASIVE PROCEDURES | Age: 68
End: 2023-09-28
Payer: MEDICARE

## 2023-09-28 ENCOUNTER — ANESTHESIA EVENT (OUTPATIENT)
Dept: CARDIAC CATH/INVASIVE PROCEDURES | Age: 68
End: 2023-09-28
Payer: MEDICARE

## 2023-09-28 ENCOUNTER — HOSPITAL ENCOUNTER (OUTPATIENT)
Dept: CARDIAC CATH/INVASIVE PROCEDURES | Age: 68
Discharge: HOME | End: 2023-09-28
Payer: MEDICARE

## 2023-09-28 VITALS
BODY MASS INDEX: 34.32 KG/M2 | SYSTOLIC BLOOD PRESSURE: 158 MMHG | HEIGHT: 65 IN | DIASTOLIC BLOOD PRESSURE: 111 MMHG | RESPIRATION RATE: 18 BRPM | OXYGEN SATURATION: 98 % | HEART RATE: 106 BPM | WEIGHT: 206 LBS | TEMPERATURE: 97.9 F

## 2023-09-28 LAB
ANION GAP SERPL CALCULATED.3IONS-SCNC: 10 MMOL/L (ref 7–16)
BUN SERPL-MCNC: 26 MG/DL (ref 6–23)
CALCIUM SERPL-MCNC: 9 MG/DL (ref 8.6–10.2)
CHLORIDE SERPL-SCNC: 101 MMOL/L (ref 98–107)
CO2 SERPL-SCNC: 29 MMOL/L (ref 22–29)
CREAT SERPL-MCNC: 1.2 MG/DL (ref 0.5–1)
EKG ATRIAL RATE: 106 BPM
EKG P AXIS: 89 DEGREES
EKG P-R INTERVAL: 148 MS
EKG Q-T INTERVAL: 340 MS
EKG QRS DURATION: 92 MS
EKG QTC CALCULATION (BAZETT): 451 MS
EKG R AXIS: 34 DEGREES
EKG T AXIS: 42 DEGREES
EKG VENTRICULAR RATE: 106 BPM
GFR SERPL CREATININE-BSD FRML MDRD: 49 ML/MIN/1.73M2
GLUCOSE SERPL-MCNC: 104 MG/DL (ref 74–99)
POTASSIUM SERPL-SCNC: 3.8 MMOL/L (ref 3.5–5)
SODIUM SERPL-SCNC: 140 MMOL/L (ref 132–146)

## 2023-09-28 PROCEDURE — 2580000003 HC RX 258

## 2023-09-28 PROCEDURE — 80048 BASIC METABOLIC PNL TOTAL CA: CPT

## 2023-09-28 PROCEDURE — 2709999900 HC NON-CHARGEABLE SUPPLY

## 2023-09-28 PROCEDURE — 6360000002 HC RX W HCPCS

## 2023-09-28 PROCEDURE — 2580000003 HC RX 258: Performed by: INTERNAL MEDICINE

## 2023-09-28 PROCEDURE — 92960 CARDIOVERSION ELECTRIC EXT: CPT

## 2023-09-28 PROCEDURE — 3700000000 HC ANESTHESIA ATTENDED CARE: Performed by: ANESTHESIOLOGY

## 2023-09-28 RX ORDER — PROPOFOL 10 MG/ML
INJECTION, EMULSION INTRAVENOUS PRN
Status: DISCONTINUED | OUTPATIENT
Start: 2023-09-28 | End: 2023-09-28 | Stop reason: SDUPTHER

## 2023-09-28 RX ORDER — FLECAINIDE ACETATE 50 MG/1
50 TABLET ORAL 2 TIMES DAILY
COMMUNITY

## 2023-09-28 RX ORDER — SODIUM CHLORIDE 9 MG/ML
INJECTION, SOLUTION INTRAVENOUS CONTINUOUS PRN
Status: DISCONTINUED | OUTPATIENT
Start: 2023-09-28 | End: 2023-09-28 | Stop reason: SDUPTHER

## 2023-09-28 RX ORDER — SODIUM CHLORIDE 9 MG/ML
INJECTION, SOLUTION INTRAVENOUS ONCE
Status: COMPLETED | OUTPATIENT
Start: 2023-09-28 | End: 2023-09-28

## 2023-09-28 RX ADMIN — SODIUM CHLORIDE: 9 INJECTION, SOLUTION INTRAVENOUS at 13:56

## 2023-09-28 RX ADMIN — SODIUM CHLORIDE 20 ML/HR: 9 INJECTION, SOLUTION INTRAVENOUS at 13:17

## 2023-09-28 RX ADMIN — PROPOFOL 50 MG: 10 INJECTION, EMULSION INTRAVENOUS at 14:04

## 2023-09-28 ASSESSMENT — ENCOUNTER SYMPTOMS: SHORTNESS OF BREATH: 1

## 2023-09-28 NOTE — PROCEDURES
Procedure: Elective outpatient electrical cardioversion. Indication: Persistent atrial fibrillation/flutter. Patient presented in atrial flutter heart rate 107. This was confirmed on EKG with stable QTc interval around 430 ms, recently started flecainide 50 mg twice a day about 3 weeks ago. Patient sedated by anesthesia 50 mg of IV propofol given. Once patient was adequately sedated 100 J synchronized tracing delivered and she converted to normal sinus rhythm. She had intermittent episodes of atrial flutter but then after about a minute remained in normal sinus rhythm. Awake and without hemodynamic or arrhythmic instability. #1 successful electrical cardioversion to normal sinus rhythm heart rate in the 60s. I talked to her  Tacos Mix after the procedure. She will continue her current medical regimen and already has a follow-up appointment [facility] cardiology next week and in a month.

## 2023-09-28 NOTE — ANESTHESIA PRE PROCEDURE
08/23/2023 07:23 AM    K 3.9 08/23/2023 07:23 AM    K 3.9 02/15/2023 04:46 AM     08/23/2023 07:23 AM    CO2 26 08/23/2023 07:23 AM    BUN 24 08/23/2023 07:23 AM    CREATININE 1.2 08/23/2023 07:23 AM    GFRAA 54 05/13/2022 04:28 AM    LABGLOM 48 08/23/2023 07:23 AM    GLUCOSE 130 08/23/2023 07:23 AM    PROT 7.4 02/14/2023 04:01 PM    CALCIUM 9.0 08/23/2023 07:23 AM    BILITOT 0.2 02/14/2023 04:01 PM    ALKPHOS 86 02/14/2023 04:01 PM    AST 13 08/23/2023 07:22 AM    ALT 13 08/23/2023 07:22 AM       POC Tests: No results for input(s): \"POCGLU\", \"POCNA\", \"POCK\", \"POCCL\", \"POCBUN\", \"POCHEMO\", \"POCHCT\" in the last 72 hours.     Coags:   Lab Results   Component Value Date/Time    PROTIME 18.0 02/14/2023 04:01 PM    INR 1.6 02/14/2023 04:01 PM       HCG (If Applicable): No results found for: \"PREGTESTUR\", \"PREGSERUM\", \"HCG\", \"HCGQUANT\"     ABGs: No results found for: \"PHART\", \"PO2ART\", \"XDR8LKC\", \"HPY0MMV\", \"BEART\", \"B2FHBDGF\"     Type & Screen (If Applicable):  No results found for: \"LABABO\", \"LABRH\"    Drug/Infectious Status (If Applicable):  No results found for: \"HIV\", \"HEPCAB\"    COVID-19 Screening (If Applicable):   Lab Results   Component Value Date/Time    COVID19 Not Detected 03/07/2022 07:53 PM    COVID19 Not Detected 03/02/2021 07:11 AM         Anesthesia Evaluation  Patient summary reviewed and Nursing notes reviewed no history of anesthetic complications:   Airway: Mallampati: II  TM distance: >3 FB   Neck ROM: full  Mouth opening: > = 3 FB   Dental:          Pulmonary:   (+) shortness of breath:  decreased breath sounds                            Cardiovascular:  Exercise tolerance: poor (<4 METS),   (+) hypertension:, dysrhythmias: atrial fibrillation,         Rhythm: irregular  Rate: normal                    Neuro/Psych:               GI/Hepatic/Renal:   (+) GERD: well controlled,           Endo/Other:    (+) DiabetesType II DM, , blood dyscrasia (Xarelto ): anticoagulation therapy:.,

## 2023-09-28 NOTE — ANESTHESIA POSTPROCEDURE EVALUATION
Department of Anesthesiology  Postprocedure Note    Patient: Zoila De Jesus  MRN: [id number]  YOB: 1955  Date of evaluation: 9/28/2023      Procedure Summary     Date: 09/28/23 [place] [facility] [facility]    Anesthesia Start: 1354 Anesthesia Stop: 6186    Procedure: CARDIOVERSION WITH ANESTHESIA Diagnosis: Chest pain, unspecified    Scheduled Providers:  Responsible Provider: Patsy Clifford MD    Anesthesia Type: MAC ASA Status: 3          Anesthesia Type: MAC    Bryan Phase I:      Bryan Phase II:        Anesthesia Post Evaluation    Patient location during evaluation: PACU  Patient participation: complete - patient participated  Level of consciousness: awake and alert  Airway patency: patent  Nausea & Vomiting: no nausea and no vomiting  Complications: no  Cardiovascular status: blood pressure returned to baseline and hemodynamically stable  Respiratory status: acceptable and spontaneous ventilation  Hydration status: euvolemic  Multimodal analgesia pain management approach  Pain management: adequate

## 2023-10-02 LAB
EKG ATRIAL RATE: 106 BPM
EKG P AXIS: 89 DEGREES
EKG P-R INTERVAL: 148 MS
EKG Q-T INTERVAL: 340 MS
EKG QRS DURATION: 92 MS
EKG QTC CALCULATION (BAZETT): 451 MS
EKG R AXIS: 34 DEGREES
EKG T AXIS: 42 DEGREES
EKG VENTRICULAR RATE: 106 BPM

## 2023-10-16 ENCOUNTER — HOSPITAL ENCOUNTER (OUTPATIENT)
Age: 68
Discharge: HOME | End: 2023-10-18

## 2023-10-16 PROCEDURE — 88305 TISSUE EXAM BY PATHOLOGIST: CPT

## 2023-10-16 PROCEDURE — 88173 CYTOPATH EVAL FNA REPORT: CPT

## 2023-10-18 LAB — NON-GYN CYTOLOGY REPORT: NORMAL

## 2023-12-27 ENCOUNTER — TELEPHONE (OUTPATIENT)
Dept: ADMINISTRATIVE | Age: 68
End: 2023-12-27

## 2023-12-27 NOTE — TELEPHONE ENCOUNTER
Pt is having a flare up of MS and is requesting to schedule an appt.  She was a previous pt of Dr. Soto and last seen in 2022.  Staff unavailable due to pt care.  Please contact pt to schedule.

## 2023-12-28 NOTE — TELEPHONE ENCOUNTER
Pt called again for status of appt; flare up of tremors in both hands; very fatigued.  Wasn't sure if anyone tried calling her.  Please call her [phone]

## 2024-01-08 LAB
ALBUMIN SERPL-MCNC: 3.7 G/DL
ALP BLD-CCNC: 80 U/L
ALT SERPL-CCNC: 32 U/L
ANION GAP SERPL CALCULATED.3IONS-SCNC: ABNORMAL MMOL/L
AST SERPL-CCNC: 36 U/L
BASOPHILS ABSOLUTE: 0.04 /ΜL
BASOPHILS RELATIVE PERCENT: 0.4 %
BILIRUB SERPL-MCNC: 1.1 MG/DL (ref 0.1–1.4)
BUN BLDV-MCNC: 41 MG/DL
CALCIUM SERPL-MCNC: 8.7 MG/DL
CHLORIDE BLD-SCNC: 106 MMOL/L
CHOLESTEROL, TOTAL: 110 MG/DL
CHOLESTEROL/HDL RATIO: NORMAL
CO2: 32 MMOL/L
CREAT SERPL-MCNC: 1.8 MG/DL
EGFR: 28.1
EOSINOPHILS ABSOLUTE: 0.1 /ΜL
EOSINOPHILS RELATIVE PERCENT: 1.2 %
GLUCOSE BLD-MCNC: 106 MG/DL
HCT VFR BLD CALC: 30.8 % (ref 36–46)
HDLC SERPL-MCNC: 39 MG/DL (ref 35–70)
HEMOGLOBIN: 9.3 G/DL (ref 12–16)
LDL CHOLESTEROL CALCULATED: 58 MG/DL (ref 0–160)
LYMPHOCYTES ABSOLUTE: 1.16 /ΜL
LYMPHOCYTES RELATIVE PERCENT: 13.1 %
MCH RBC QN AUTO: 28.7 PG
MCHC RBC AUTO-ENTMCNC: 30.1 G/DL
MCV RBC AUTO: 95.4 FL
MONOCYTES ABSOLUTE: 0.78 /ΜL
MONOCYTES RELATIVE PERCENT: 8.8 %
NEUTROPHILS ABSOLUTE: 6.39 /ΜL
NEUTROPHILS RELATIVE PERCENT: 72.4 %
NONHDLC SERPL-MCNC: NORMAL MG/DL
PDW BLD-RTO: 15.7 %
PLATELET # BLD: 245 K/ΜL
PMV BLD AUTO: 9 FL
POTASSIUM SERPL-SCNC: 4.2 MMOL/L
RBC # BLD: 3.23 10^6/ΜL
SODIUM BLD-SCNC: 146 MMOL/L
TOTAL PROTEIN: 6.3
TRIGL SERPL-MCNC: 64 MG/DL
VLDLC SERPL CALC-MCNC: 13 MG/DL
WBC # BLD: 8.82 10^3/ML

## 2024-02-06 LAB
ALBUMIN SERPL-MCNC: 3.4 G/DL
ALBUMIN: 3.3
ALP BLD-CCNC: 86 U/L
ALT SERPL-CCNC: 19 U/L
ANION GAP SERPL CALCULATED.3IONS-SCNC: ABNORMAL MMOL/L
AST SERPL-CCNC: 22 U/L
BASOPHILS ABSOLUTE: 32 /ΜL
BASOPHILS RELATIVE PERCENT: 0.3 %
BILIRUB SERPL-MCNC: 0.6 MG/DL (ref 0.1–1.4)
BUN BLDV-MCNC: 43 MG/DL
CALCIUM SERPL-MCNC: 8.3 MG/DL
CHLORIDE BLD-SCNC: 108 MMOL/L
CO2: 25 MMOL/L
CREAT SERPL-MCNC: 1.79 MG/DL
EGFR: 30
EOSINOPHILS ABSOLUTE: 138 /ΜL
EOSINOPHILS RELATIVE PERCENT: 1.3 %
FERRITIN: 32 NG/ML (ref 9–150)
GLUCOSE BLD-MCNC: 117 MG/DL
HCT VFR BLD CALC: 30.6 % (ref 36–46)
HEMOGLOBIN: 9.6 G/DL (ref 12–16)
IRON % SATURATION: 4
IRON: 16
LYMPHOCYTES ABSOLUTE: 1813 /ΜL
LYMPHOCYTES RELATIVE PERCENT: 17.1 %
MCH RBC QN AUTO: 28.4 PG
MCHC RBC AUTO-ENTMCNC: 31.4 G/DL
MCV RBC AUTO: 90.5 FL
MONOCYTES ABSOLUTE: 1357 /ΜL
MONOCYTES RELATIVE PERCENT: 12.8 %
NEUTROPHILS ABSOLUTE: 7261 /ΜL
NEUTROPHILS RELATIVE PERCENT: 68.5 %
PDW BLD-RTO: 14.6 %
PLATELET # BLD: 210 K/ΜL
PMV BLD AUTO: 9.5 FL
POTASSIUM SERPL-SCNC: 4.3 MMOL/L
RBC # BLD: 3.38 10^6/ΜL
SODIUM BLD-SCNC: 145 MMOL/L
TOTAL IRON BINDING CAPACITY: 449
TOTAL PROTEIN: 6.4
VITAMIN B-12: 946
WBC # BLD: 10.6 10^3/ML

## 2024-02-29 ENCOUNTER — TELEPHONE (OUTPATIENT)
Dept: INTERNAL MEDICINE | Age: 69
End: 2024-02-29

## 2024-02-29 NOTE — TELEPHONE ENCOUNTER
----- Message from Luz Elena Abebe sent at 2/29/2024  9:53 AM EST -----  Subject: Appointment Request    Reason for Call: New Patient/New to Provider Appointment needed: New   Patient Request Appointment    QUESTIONS    Reason for appointment request? Requested Provider unavailable - BERTHA   WALTERESTEVAN     Additional Information for Provider? Pt is wanting to know if [facility] would   be willing to accept her as a new pt; this pt needs to get scheduled for a   hospital f/u appt; this pt has been referred by her friend, Prema Dinora;   PLEASE ADVISE  ---------------------------------------------------------------------------  --------------  CALL BACK INFO  [phone]; OK to leave message on voicemail  ---------------------------------------------------------------------------  --------------  SCRIPT ANSWERS

## 2024-03-04 ENCOUNTER — HOSPITAL ENCOUNTER (OUTPATIENT)
Dept: GENERAL RADIOLOGY | Age: 69
Discharge: HOME | End: 2024-03-06
Payer: MEDICARE

## 2024-03-04 ENCOUNTER — OFFICE VISIT (OUTPATIENT)
Dept: INTERNAL MEDICINE | Age: 69
End: 2024-03-04
Payer: MEDICARE

## 2024-03-04 ENCOUNTER — HOSPITAL ENCOUNTER (OUTPATIENT)
Age: 69
Discharge: HOME | End: 2024-03-06
Payer: MEDICARE

## 2024-03-04 VITALS
SYSTOLIC BLOOD PRESSURE: 149 MMHG | HEART RATE: 93 BPM | BODY MASS INDEX: 32.15 KG/M2 | OXYGEN SATURATION: 97 % | DIASTOLIC BLOOD PRESSURE: 75 MMHG | HEIGHT: 65 IN | WEIGHT: 193 LBS | TEMPERATURE: 97.3 F | RESPIRATION RATE: 18 BRPM

## 2024-03-04 DIAGNOSIS — R14.0 ABDOMINAL BLOATING: ICD-10-CM

## 2024-03-04 DIAGNOSIS — I27.20 SEVERE PULMONARY HYPERTENSION (HCC): ICD-10-CM

## 2024-03-04 DIAGNOSIS — N17.9 AKI (ACUTE KIDNEY INJURY) (HCC): ICD-10-CM

## 2024-03-04 DIAGNOSIS — N18.32 STAGE 3B CHRONIC KIDNEY DISEASE (HCC): ICD-10-CM

## 2024-03-04 DIAGNOSIS — Z76.89 ESTABLISHING CARE WITH NEW DOCTOR, ENCOUNTER FOR: ICD-10-CM

## 2024-03-04 DIAGNOSIS — K21.9 GASTROESOPHAGEAL REFLUX DISEASE, UNSPECIFIED WHETHER ESOPHAGITIS PRESENT: Chronic | ICD-10-CM

## 2024-03-04 DIAGNOSIS — R11.0 NAUSEA: ICD-10-CM

## 2024-03-04 DIAGNOSIS — Z91.81 AT HIGH RISK FOR FALLS: ICD-10-CM

## 2024-03-04 DIAGNOSIS — E05.90 SUBCLINICAL HYPERTHYROIDISM: ICD-10-CM

## 2024-03-04 DIAGNOSIS — I10 PRIMARY HYPERTENSION: ICD-10-CM

## 2024-03-04 DIAGNOSIS — I48.0 PAROXYSMAL ATRIAL FIBRILLATION (HCC): ICD-10-CM

## 2024-03-04 DIAGNOSIS — D50.9 IRON DEFICIENCY ANEMIA, UNSPECIFIED IRON DEFICIENCY ANEMIA TYPE: ICD-10-CM

## 2024-03-04 DIAGNOSIS — R25.1 TREMOR: Primary | ICD-10-CM

## 2024-03-04 DIAGNOSIS — G35 MULTIPLE SCLEROSIS (HCC): ICD-10-CM

## 2024-03-04 PROBLEM — H69.90 DYSFUNCTION OF EUSTACHIAN TUBE: Status: ACTIVE | Noted: 2024-03-04

## 2024-03-04 PROBLEM — D64.9 ERYTHROCYTOPENIA: Status: ACTIVE | Noted: 2024-03-04

## 2024-03-04 PROBLEM — K58.9 IRRITABLE BOWEL SYNDROME: Status: ACTIVE | Noted: 2024-03-04

## 2024-03-04 PROBLEM — I34.0 MITRAL VALVE INSUFFICIENCY: Status: ACTIVE | Noted: 2024-02-15

## 2024-03-04 PROBLEM — N18.31 STAGE 3A CHRONIC KIDNEY DISEASE (HCC): Status: ACTIVE | Noted: 2024-02-14

## 2024-03-04 PROBLEM — M54.50 LOW BACK PAIN: Status: ACTIVE | Noted: 2019-11-10

## 2024-03-04 PROBLEM — H93.8X9 FULLNESS IN EAR: Status: ACTIVE | Noted: 2024-03-04

## 2024-03-04 PROBLEM — H93.299 ABNORMAL AUDITORY PERCEPTION: Status: ACTIVE | Noted: 2024-03-04

## 2024-03-04 PROBLEM — M17.9 ARTHRITIS OF KNEE, DEGENERATIVE: Status: ACTIVE | Noted: 2024-03-04

## 2024-03-04 PROBLEM — I38 HEART VALVE DISEASE: Status: ACTIVE | Noted: 2024-03-04

## 2024-03-04 PROBLEM — R51.9 HEADACHE: Status: ACTIVE | Noted: 2024-03-04

## 2024-03-04 LAB
ABSOLUTE IMMATURE GRANULOCYTE: 0.04 K/UL (ref 0–0.58)
ALBUMIN SERPL-MCNC: 4.1 G/DL (ref 3.5–5.2)
ALP BLD-CCNC: 78 U/L (ref 35–104)
ALT SERPL-CCNC: 25 U/L (ref 0–32)
ANION GAP SERPL CALCULATED.3IONS-SCNC: 22 MMOL/L (ref 7–16)
AST SERPL-CCNC: 33 U/L (ref 0–31)
BASOPHILS ABSOLUTE: 0.05 K/UL (ref 0–0.2)
BASOPHILS RELATIVE PERCENT: 1 % (ref 0–2)
BILIRUB SERPL-MCNC: 0.5 MG/DL (ref 0–1.2)
BUN BLDV-MCNC: 35 MG/DL (ref 6–23)
CALCIUM SERPL-MCNC: 9.4 MG/DL (ref 8.6–10.2)
CHLORIDE BLD-SCNC: 102 MMOL/L (ref 98–107)
CO2: 24 MMOL/L (ref 22–29)
CREAT SERPL-MCNC: 2.1 MG/DL (ref 0.5–1)
EOSINOPHILS ABSOLUTE: 0.07 K/UL (ref 0.05–0.5)
EOSINOPHILS RELATIVE PERCENT: 1 % (ref 0–6)
GFR SERPL CREATININE-BSD FRML MDRD: 24 ML/MIN/1.73M2
GLUCOSE BLD-MCNC: 84 MG/DL (ref 74–99)
HCT VFR BLD CALC: 35.8 % (ref 34–48)
HEMOGLOBIN: 11.2 G/DL (ref 11.5–15.5)
IMMATURE GRANULOCYTES: 0 % (ref 0–5)
LIPASE: 65 U/L (ref 13–60)
LYMPHOCYTES ABSOLUTE: 1.53 K/UL (ref 1.5–4)
LYMPHOCYTES RELATIVE PERCENT: 16 % (ref 20–42)
MCH RBC QN AUTO: 28.3 PG (ref 26–35)
MCHC RBC AUTO-ENTMCNC: 31.3 G/DL (ref 32–34.5)
MCV RBC AUTO: 90.4 FL (ref 80–99.9)
MONOCYTES ABSOLUTE: 0.83 K/UL (ref 0.1–0.95)
MONOCYTES RELATIVE PERCENT: 9 % (ref 2–12)
NEUTROPHILS ABSOLUTE: 6.8 K/UL (ref 1.8–7.3)
NEUTROPHILS RELATIVE PERCENT: 73 % (ref 43–80)
PDW BLD-RTO: 16.1 % (ref 11.5–15)
PLATELET # BLD: 251 K/UL (ref 130–450)
PMV BLD AUTO: 11.7 FL (ref 7–12)
POTASSIUM SERPL-SCNC: 4 MMOL/L (ref 3.5–5)
RBC # BLD: 3.96 M/UL (ref 3.5–5.5)
SODIUM BLD-SCNC: 148 MMOL/L (ref 132–146)
T4 FREE: 1.9 NG/DL (ref 0.9–1.7)
TOTAL PROTEIN: 7.5 G/DL (ref 6.4–8.3)
TSH SERPL DL<=0.05 MIU/L-ACNC: 0.68 UIU/ML (ref 0.27–4.2)
WBC # BLD: 9.3 K/UL (ref 4.5–11.5)

## 2024-03-04 PROCEDURE — 93010 ELECTROCARDIOGRAM REPORT: CPT | Performed by: INTERNAL MEDICINE

## 2024-03-04 PROCEDURE — 3075F SYST BP GE 130 - 139MM HG: CPT | Performed by: INTERNAL MEDICINE

## 2024-03-04 PROCEDURE — G8417 CALC BMI ABV UP PARAM F/U: HCPCS | Performed by: INTERNAL MEDICINE

## 2024-03-04 PROCEDURE — 1090F PRES/ABSN URINE INCON ASSESS: CPT | Performed by: INTERNAL MEDICINE

## 2024-03-04 PROCEDURE — G8427 DOCREV CUR MEDS BY ELIG CLIN: HCPCS | Performed by: INTERNAL MEDICINE

## 2024-03-04 PROCEDURE — 99204 OFFICE O/P NEW MOD 45 MIN: CPT | Performed by: INTERNAL MEDICINE

## 2024-03-04 PROCEDURE — G8484 FLU IMMUNIZE NO ADMIN: HCPCS | Performed by: INTERNAL MEDICINE

## 2024-03-04 PROCEDURE — 1123F ACP DISCUSS/DSCN MKR DOCD: CPT | Performed by: INTERNAL MEDICINE

## 2024-03-04 PROCEDURE — 1036F TOBACCO NON-USER: CPT | Performed by: INTERNAL MEDICINE

## 2024-03-04 PROCEDURE — 93005 ELECTROCARDIOGRAM TRACING: CPT | Performed by: INTERNAL MEDICINE

## 2024-03-04 PROCEDURE — 3078F DIAST BP <80 MM HG: CPT | Performed by: INTERNAL MEDICINE

## 2024-03-04 PROCEDURE — 36415 COLL VENOUS BLD VENIPUNCTURE: CPT | Performed by: INTERNAL MEDICINE

## 2024-03-04 PROCEDURE — 74019 RADEX ABDOMEN 2 VIEWS: CPT

## 2024-03-04 PROCEDURE — G8400 PT W/DXA NO RESULTS DOC: HCPCS | Performed by: INTERNAL MEDICINE

## 2024-03-04 PROCEDURE — 3017F COLORECTAL CA SCREEN DOC REV: CPT | Performed by: INTERNAL MEDICINE

## 2024-03-04 RX ORDER — AMIODARONE HYDROCHLORIDE 400 MG/1
400 TABLET ORAL 3 TIMES DAILY
COMMUNITY
Start: 2024-02-28 | End: 2024-03-05

## 2024-03-04 RX ORDER — FERROUS SULFATE 325(65) MG
325 TABLET ORAL
COMMUNITY

## 2024-03-04 RX ORDER — TORSEMIDE 20 MG/1
20 TABLET ORAL DAILY
COMMUNITY

## 2024-03-04 RX ORDER — AMIODARONE HYDROCHLORIDE 200 MG/1
200 TABLET ORAL DAILY
COMMUNITY
Start: 2024-03-06

## 2024-03-04 SDOH — ECONOMIC STABILITY: HOUSING INSECURITY
IN THE LAST 12 MONTHS, WAS THERE A TIME WHEN YOU DID NOT HAVE A STEADY PLACE TO SLEEP OR SLEPT IN A SHELTER (INCLUDING NOW)?: NO

## 2024-03-04 SDOH — ECONOMIC STABILITY: FOOD INSECURITY: WITHIN THE PAST 12 MONTHS, YOU WORRIED THAT YOUR FOOD WOULD RUN OUT BEFORE YOU GOT MONEY TO BUY MORE.: NEVER TRUE

## 2024-03-04 SDOH — ECONOMIC STABILITY: FOOD INSECURITY: WITHIN THE PAST 12 MONTHS, THE FOOD YOU BOUGHT JUST DIDN'T LAST AND YOU DIDN'T HAVE MONEY TO GET MORE.: NEVER TRUE

## 2024-03-04 SDOH — ECONOMIC STABILITY: INCOME INSECURITY: HOW HARD IS IT FOR YOU TO PAY FOR THE VERY BASICS LIKE FOOD, HOUSING, MEDICAL CARE, AND HEATING?: NOT HARD AT ALL

## 2024-03-04 ASSESSMENT — ENCOUNTER SYMPTOMS
BACK PAIN: 0
COUGH: 1
VOMITING: 1
WHEEZING: 1
BLOOD IN STOOL: 0
CONSTIPATION: 0
RHINORRHEA: 1
NAUSEA: 1
DIARRHEA: 0

## 2024-03-04 ASSESSMENT — PATIENT HEALTH QUESTIONNAIRE - PHQ9
SUM OF ALL RESPONSES TO PHQ QUESTIONS 1-9: 0
SUM OF ALL RESPONSES TO PHQ QUESTIONS 1-9: 0
1. LITTLE INTEREST OR PLEASURE IN DOING THINGS: 0
SUM OF ALL RESPONSES TO PHQ QUESTIONS 1-9: 0
SUM OF ALL RESPONSES TO PHQ QUESTIONS 1-9: 0
2. FEELING DOWN, DEPRESSED OR HOPELESS: 0
SUM OF ALL RESPONSES TO PHQ9 QUESTIONS 1 & 2: 0

## 2024-03-05 NOTE — PROGRESS NOTES
Called Malinda re: changed dose of amiodarone.  She was called by [place] and did change her dose to 200 mg daily.  She took this new dose today.  Additionally, she reports improvement in her stomach symptoms with the feeling of a knot in her stomach now resolved.  Will continue to follow.     CMP with increased sodium and worsening renal function.  This is likely secondary to ongoing diuresis with torsemide.  Will have Malinda hold this medication for now with tracking of daily weights. Plan to recheck BMP on Monday prior to her scheduled office visit.     Malinda reports that she had the symptoms of shaking this morning around 3 AM but her BP was not elevated.  Leg weakness has also improved. Will continue to monitor and may need to further evaluate based on continuation of symptoms. Malinda reports the following vitals this morning: Pulse: 83 BP:128/77.    Malinda voiced understanding of the above.     Any Youngblood MD  3/5/2024  2:09 PM      
Colonoscopy and endoscopy at [facility]  Due for mammogram  DEXA at [facility]    Three tubes of blood (2 green and one lavender) drawn from [facility] at 1221 and sent to lab via tube system at 1300. See media. Ruth Ann Miranda, LAWRENCEN                                                                        
325) 325 (65 Fe) MG tablet Take 1 tablet by mouth daily (with breakfast)      aspirin 81 MG chewable tablet Take 1 tablet by mouth daily 30 tablet 3    atorvastatin (LIPITOR) 10 MG tablet Take 1 tablet by mouth daily      pantoprazole (PROTONIX) 40 MG tablet Take 1 tablet by mouth daily       No current facility-administered medications on file prior to visit.       OBJECTIVE:    VS:   Vitals:    03/04/24 1002 03/04/24 1153 03/04/24 1155 03/04/24 1157   BP: (!) 151/79 133/75 (!) 155/84 (!) 149/75   Site: Right Upper Arm Right Upper Arm Right Upper Arm Right Upper Arm   Position: Sitting Supine Sitting Standing   Cuff Size: Large Adult Medium Adult Medium Adult Medium Adult   Pulse: 87 79 83 93   Resp: 18      Temp: 97.3 °F (36.3 °C)      TempSrc: Temporal      SpO2: 97%      Weight: 87.5 kg (193 lb)      Height: 1.651 m (5' 5\")        Physical Exam   HEENT:  PERRL, EOMI, Mouth without erythema or exudate. TMs clear B.    Lungs:  CTA B, no vertebral column tenderness to palpation, no flank tenderness to percussion of flanks B.   Neck:   No carotid bruits appreciated B. No LAD appreciated.   CVS:  +s1/s2 without m/g/r appreciated.    Abd:  + BS, +tenderness to palpation over RUQ and mid-epigastric area, ND, No renal or aortic bruits, No hepatosplenomegaly appreciated.  No guarding or rebound  Extr:  2+ DP/PT pulses B, no pitting edema  Neurological exam reveals alert, oriented, normal speech, + high frequency tremor noted, neck supple without rigidity, cranial nerves II through XII intact, DTR's normal and symmetric in upper extremities. Decreased LE reflexes in Patellar area - patient noted to have had knee surgery in the past, normal muscle tone,  strength 5/5,  No pronator drift.  No clonus.     + Romberg    RTC:  Return in about 1 week (around 3/11/2024).      Any Youngblood MD   3/5/2024 2:11 PM    On the basis of positive falls risk screening, assessment and plan is as follows: medications adjusted- Amiodarone

## 2024-03-11 ENCOUNTER — OFFICE VISIT (OUTPATIENT)
Dept: INTERNAL MEDICINE | Age: 69
End: 2024-03-11
Payer: MEDICARE

## 2024-03-11 ENCOUNTER — HOSPITAL ENCOUNTER (OUTPATIENT)
Age: 69
Discharge: HOME | End: 2024-03-11
Payer: MEDICARE

## 2024-03-11 VITALS
RESPIRATION RATE: 18 BRPM | BODY MASS INDEX: 32.46 KG/M2 | WEIGHT: 194.8 LBS | OXYGEN SATURATION: 98 % | HEART RATE: 81 BPM | DIASTOLIC BLOOD PRESSURE: 77 MMHG | TEMPERATURE: 97.7 F | HEIGHT: 65 IN | SYSTOLIC BLOOD PRESSURE: 157 MMHG

## 2024-03-11 DIAGNOSIS — N18.32 STAGE 3B CHRONIC KIDNEY DISEASE (HCC): ICD-10-CM

## 2024-03-11 DIAGNOSIS — R10.13 EPIGASTRIC PAIN: Primary | ICD-10-CM

## 2024-03-11 DIAGNOSIS — G62.9 PERIPHERAL POLYNEUROPATHY: ICD-10-CM

## 2024-03-11 LAB
ANION GAP SERPL CALCULATED.3IONS-SCNC: 8 MMOL/L (ref 7–16)
BUN SERPL-MCNC: 23 MG/DL (ref 6–23)
CALCIUM SERPL-MCNC: 9.4 MG/DL (ref 8.6–10.2)
CHLORIDE SERPL-SCNC: 103 MMOL/L (ref 98–107)
CO2 SERPL-SCNC: 28 MMOL/L (ref 22–29)
CREAT SERPL-MCNC: 1.6 MG/DL (ref 0.5–1)
FOLATE: >20 NG/ML (ref 4.8–24.2)
GFR SERPL CREATININE-BSD FRML MDRD: 34 ML/MIN/1.73M2
GLUCOSE SERPL-MCNC: 108 MG/DL (ref 74–99)
LIPASE: 74 U/L (ref 13–60)
POTASSIUM SERPL-SCNC: 4.3 MMOL/L (ref 3.5–5)
SODIUM SERPL-SCNC: 139 MMOL/L (ref 132–146)
VITAMIN B-12: 1090 PG/ML (ref 211–946)

## 2024-03-11 PROCEDURE — 3077F SYST BP >= 140 MM HG: CPT | Performed by: INTERNAL MEDICINE

## 2024-03-11 PROCEDURE — G8417 CALC BMI ABV UP PARAM F/U: HCPCS | Performed by: INTERNAL MEDICINE

## 2024-03-11 PROCEDURE — 1036F TOBACCO NON-USER: CPT | Performed by: INTERNAL MEDICINE

## 2024-03-11 PROCEDURE — G8427 DOCREV CUR MEDS BY ELIG CLIN: HCPCS | Performed by: INTERNAL MEDICINE

## 2024-03-11 PROCEDURE — 99214 OFFICE O/P EST MOD 30 MIN: CPT | Performed by: INTERNAL MEDICINE

## 2024-03-11 PROCEDURE — 1090F PRES/ABSN URINE INCON ASSESS: CPT | Performed by: INTERNAL MEDICINE

## 2024-03-11 PROCEDURE — 3017F COLORECTAL CA SCREEN DOC REV: CPT | Performed by: INTERNAL MEDICINE

## 2024-03-11 PROCEDURE — 36415 COLL VENOUS BLD VENIPUNCTURE: CPT

## 2024-03-11 PROCEDURE — G8400 PT W/DXA NO RESULTS DOC: HCPCS | Performed by: INTERNAL MEDICINE

## 2024-03-11 PROCEDURE — 99212 OFFICE O/P EST SF 10 MIN: CPT | Performed by: INTERNAL MEDICINE

## 2024-03-11 PROCEDURE — 80048 BASIC METABOLIC PNL TOTAL CA: CPT

## 2024-03-11 PROCEDURE — 1123F ACP DISCUSS/DSCN MKR DOCD: CPT | Performed by: INTERNAL MEDICINE

## 2024-03-11 PROCEDURE — 36415 COLL VENOUS BLD VENIPUNCTURE: CPT | Performed by: INTERNAL MEDICINE

## 2024-03-11 PROCEDURE — G8484 FLU IMMUNIZE NO ADMIN: HCPCS | Performed by: INTERNAL MEDICINE

## 2024-03-11 PROCEDURE — 3078F DIAST BP <80 MM HG: CPT | Performed by: INTERNAL MEDICINE

## 2024-03-11 RX ORDER — TORSEMIDE 10 MG/1
10 TABLET ORAL EVERY OTHER DAY
Qty: 30 TABLET | Refills: 2
Start: 2024-03-11

## 2024-03-11 RX ORDER — FEXOFENADINE HCL 180 MG/1
180 TABLET ORAL DAILY
Qty: 30 TABLET | Refills: 0 | Status: SHIPPED | OUTPATIENT
Start: 2024-03-11 | End: 2024-04-10

## 2024-03-11 RX ORDER — METOPROLOL SUCCINATE 25 MG/1
25 TABLET, EXTENDED RELEASE ORAL 2 TIMES DAILY
Qty: 60 TABLET | Refills: 2 | Status: SHIPPED | OUTPATIENT
Start: 2024-03-11

## 2024-03-11 NOTE — PROGRESS NOTES
[facility]icians - [facility] Internal Medicine      SUBJECTIVE:  Malinda Stewart (:  1955) is a 69 y.o. female here for evaluation of the following chief complaint(s):  Discuss Labs (Had done today), Numbness (C/o numbess and tingling and bottom feet feel burning ), and Bloated (Abd.   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  This was coordinated in conjunction with cardiology at [facility]. The past 2 nights has not had the night time awakenings.  BP has been ok and no racing HR. 2 nights ago, had the band tightness. Today, this feeling is gone. Overall, feels very tired. Consider gabapentin if ongoing paresthesias.    Recheck lipase   Continue to monitor paresthesias in feet.  If no further improvement, will check EMG of lower extremities.    Advised for a soft diet to help alleviate symptoms.  This could be a low level pancreatitis.     DAVID with stage 3b CKD - increased BUN/Cr on CMP. Malinda was told to hold torsemide and monitor weights. No weight gain. No SOB.  Much improved renal function today.    Restart torsemide at 10 mg every other day.  Malinda is to monitor her weights and if these increase, will need to resume daily diuretic.     Pulmonary HTN - has follow-up with pulmonary on 3/22/2024.      Long-standing cough - this has been ongong for many years.  Worse over the past 2 years.  Felt like there was phlegm blocking her throat.  Chronic sinus drainage.  Comes and goes.  Worse at night with lying down. Saw an ENT in the past for an ear drum rupture - healed on its own. Took claritin without any improvement.    Add Allegra and fluticasone nasal spray.    Already on PPI for acid reflux.     Atrial fibrillation - on Rivaroxaban.  Thursday had a bad day with elevated BP.     Add metoprolol, 25 mg twice daily back to regimen.     Thyroid nodule - awaiting records from Dr. De La Cruz. Malinda to have a

## 2024-03-12 ENCOUNTER — TELEPHONE (OUTPATIENT)
Dept: INTERNAL MEDICINE | Age: 69
End: 2024-03-12

## 2024-03-12 ENCOUNTER — APPOINTMENT (OUTPATIENT)
Dept: GENERAL RADIOLOGY | Age: 69
End: 2024-03-12
Payer: MEDICARE

## 2024-03-12 ENCOUNTER — HOSPITAL ENCOUNTER (EMERGENCY)
Age: 69
Discharge: HOME | End: 2024-03-12
Attending: EMERGENCY MEDICINE
Payer: MEDICARE

## 2024-03-12 VITALS
OXYGEN SATURATION: 98 % | WEIGHT: 194 LBS | DIASTOLIC BLOOD PRESSURE: 81 MMHG | HEART RATE: 77 BPM | SYSTOLIC BLOOD PRESSURE: 156 MMHG | RESPIRATION RATE: 14 BRPM | BODY MASS INDEX: 32.32 KG/M2 | TEMPERATURE: 97.4 F | HEIGHT: 65 IN

## 2024-03-12 DIAGNOSIS — I48.0 PAROXYSMAL ATRIAL FIBRILLATION (HCC): Primary | ICD-10-CM

## 2024-03-12 DIAGNOSIS — R00.0 TACHYCARDIA: ICD-10-CM

## 2024-03-12 PROBLEM — H93.8X9 FULLNESS IN EAR: Status: RESOLVED | Noted: 2024-03-04 | Resolved: 2024-03-12

## 2024-03-12 PROBLEM — R07.9 CHEST PAIN: Status: RESOLVED | Noted: 2020-04-14 | Resolved: 2024-03-12

## 2024-03-12 LAB
ALBUMIN SERPL-MCNC: 4.6 G/DL (ref 3.5–5.2)
ALP SERPL-CCNC: 89 U/L (ref 35–104)
ALT SERPL-CCNC: 27 U/L (ref 0–32)
ANION GAP SERPL CALCULATED.3IONS-SCNC: 15 MMOL/L (ref 7–16)
AST SERPL-CCNC: 27 U/L (ref 0–31)
BASOPHILS # BLD: 0.1 K/UL (ref 0–0.2)
BASOPHILS NFR BLD: 1 % (ref 0–2)
BILIRUB SERPL-MCNC: 0.4 MG/DL (ref 0–1.2)
BNP SERPL-MCNC: 1010 PG/ML (ref 0–125)
BUN SERPL-MCNC: 24 MG/DL (ref 6–23)
CALCIUM SERPL-MCNC: 10.2 MG/DL (ref 8.6–10.2)
CHLORIDE SERPL-SCNC: 103 MMOL/L (ref 98–107)
CO2 SERPL-SCNC: 25 MMOL/L (ref 22–29)
CREAT SERPL-MCNC: 1.7 MG/DL (ref 0.5–1)
EOSINOPHIL # BLD: 0.29 K/UL (ref 0.05–0.5)
EOSINOPHILS RELATIVE PERCENT: 3 % (ref 0–6)
ERYTHROCYTE [DISTWIDTH] IN BLOOD BY AUTOMATED COUNT: 15.8 % (ref 11.5–15)
GFR SERPL CREATININE-BSD FRML MDRD: 33 ML/MIN/1.73M2
GLUCOSE SERPL-MCNC: 106 MG/DL (ref 74–99)
HCT VFR BLD AUTO: 39 % (ref 34–48)
HGB BLD-MCNC: 12.5 G/DL (ref 11.5–15.5)
IMM GRANULOCYTES # BLD AUTO: 0.03 K/UL (ref 0–0.58)
IMM GRANULOCYTES NFR BLD: 0 % (ref 0–5)
LYMPHOCYTES NFR BLD: 2.28 K/UL (ref 1.5–4)
LYMPHOCYTES RELATIVE PERCENT: 22 % (ref 20–42)
MCH RBC QN AUTO: 28.8 PG (ref 26–35)
MCHC RBC AUTO-ENTMCNC: 32.1 G/DL (ref 32–34.5)
MCV RBC AUTO: 89.9 FL (ref 80–99.9)
MONOCYTES NFR BLD: 0.84 K/UL (ref 0.1–0.95)
MONOCYTES NFR BLD: 8 % (ref 2–12)
NEUTROPHILS NFR BLD: 65 % (ref 43–80)
NEUTS SEG NFR BLD: 6.69 K/UL (ref 1.8–7.3)
PLATELET # BLD AUTO: 234 K/UL (ref 130–450)
PMV BLD AUTO: 11.5 FL (ref 7–12)
POTASSIUM SERPL-SCNC: 4 MMOL/L (ref 3.5–5)
PROT SERPL-MCNC: 8.3 G/DL (ref 6.4–8.3)
RBC # BLD AUTO: 4.34 M/UL (ref 3.5–5.5)
SODIUM SERPL-SCNC: 143 MMOL/L (ref 132–146)
TROPONIN I SERPL HS-MCNC: 26 NG/L (ref 0–9)
TROPONIN I SERPL HS-MCNC: 30 NG/L (ref 0–9)
WBC OTHER # BLD: 10.2 K/UL (ref 4.5–11.5)

## 2024-03-12 PROCEDURE — 93005 ELECTROCARDIOGRAM TRACING: CPT

## 2024-03-12 PROCEDURE — 71045 X-RAY EXAM CHEST 1 VIEW: CPT

## 2024-03-12 PROCEDURE — 85025 COMPLETE CBC W/AUTO DIFF WBC: CPT

## 2024-03-12 PROCEDURE — 84484 ASSAY OF TROPONIN QUANT: CPT

## 2024-03-12 PROCEDURE — 99285 EMERGENCY DEPT VISIT HI MDM: CPT

## 2024-03-12 PROCEDURE — 80053 COMPREHEN METABOLIC PANEL: CPT

## 2024-03-12 PROCEDURE — 83880 ASSAY OF NATRIURETIC PEPTIDE: CPT

## 2024-03-12 ASSESSMENT — LIFESTYLE VARIABLES
HOW OFTEN DO YOU HAVE A DRINK CONTAINING ALCOHOL: NEVER
HOW MANY STANDARD DRINKS CONTAINING ALCOHOL DO YOU HAVE ON A TYPICAL DAY: PATIENT DOES NOT DRINK

## 2024-03-12 NOTE — ED PROVIDER NOTES
palpitations had ceased.  Patient's workup was notable for proBNP of 1000 but overall within the patient's normal.  Troponin was downtrending and negative.  Chest x-ray concerning for acute abnormalities.  Patient remained in sinus rhythm despite the lack of any intervention on our part.  She has no current symptoms and all symptoms she previously had had resolved.  I discussed outpatient follow-up with her specialist and primary care physician and she will be discharged home at this time.                  Past medical history/chonic conditions affecting care   has a past medical history of A-fib (HCC) (02/03/2021), CHF (congestive heart failure) (HCC), Diabetes mellitus (HCC), HLD (hyperlipidemia) (05/12/2022), HTN (hypertension), and MS (multiple sclerosis) ([facility]).     Social History     Tobacco Use    Smoking status: Never    Smokeless tobacco: Never   Substance Use Topics    Alcohol use: Not Currently    Drug use: Not Currently                 Final impression  1. Paroxysmal atrial fibrillation (HCC)    2. Tachycardia          Disposition/plan  DISPOSITION Decision To Discharge 03/12/2024 06:52:51 PM  Patient agrees with the plan, states their verbal understanding, and has all questions answered.    PATIENT REFERRED TO:  Dr. Edwards  Call to schedule close follow up in 1 week        Fortino Austin MD  [address] [place]  [address]  [place] [id number]  [phone]    Call   As needed      DISCHARGE MEDICATIONS:  Discharge Medication List as of 3/12/2024  7:00 PM          DISCONTINUED MEDICATIONS:  Discharge Medication List as of 3/12/2024  7:00 PM                   Signed:  Daniel Johsi MD  10:56 PM  3/12/2024

## 2024-03-12 NOTE — TELEPHONE ENCOUNTER
Malinda reports that her HR is elevated at 162.  Mobile EKG is reading as rapid HR with a reading of atrial fibrillation. No dizziness or lightheadedness.  Feeling shaky.  I have advised Malinda to be seen in ED.  She is coming to [place].  Patient prefers to see a [facility] Cardiologist instead of a cardiologist from heart [place]. (Previous patient of Dr. Butterfield).      Any Youngblood MD  3/12/2024

## 2024-03-13 LAB
EKG ATRIAL RATE: 156 BPM
EKG Q-T INTERVAL: 308 MS
EKG QRS DURATION: 86 MS
EKG QTC CALCULATION (BAZETT): 493 MS
EKG R AXIS: 112 DEGREES
EKG T AXIS: -35 DEGREES
EKG VENTRICULAR RATE: 154 BPM

## 2024-03-13 PROCEDURE — 93010 ELECTROCARDIOGRAM REPORT: CPT | Performed by: INTERNAL MEDICINE

## 2024-03-14 LAB
ALBUMIN SERPL-MCNC: 3.9 G/DL
ALP BLD-CCNC: 65 U/L
ALT SERPL-CCNC: 22 U/L
ANION GAP SERPL CALCULATED.3IONS-SCNC: ABNORMAL MMOL/L
AST SERPL-CCNC: 22 U/L
BASOPHILS ABSOLUTE: 40 /ΜL
BASOPHILS RELATIVE PERCENT: 0.5 %
BILIRUB SERPL-MCNC: 0.5 MG/DL (ref 0.1–1.4)
BUN BLDV-MCNC: 25 MG/DL
CALCIUM SERPL-MCNC: 9.2 MG/DL
CHLORIDE BLD-SCNC: 107 MMOL/L
CO2: ABNORMAL
CREAT SERPL-MCNC: 1.39 MG/DL
EGFR: ABNORMAL
EOSINOPHILS ABSOLUTE: 300 /ΜL
EOSINOPHILS RELATIVE PERCENT: 3.8 %
FERRITIN: 232 NG/ML (ref 9–150)
GLUCOSE BLD-MCNC: 106 MG/DL
HCT VFR BLD CALC: 35 % (ref 36–46)
HEMOGLOBIN: 11.4 G/DL (ref 12–16)
IRON % SATURATION: 28
IRON: 85
LYMPHOCYTES ABSOLUTE: 1533 /ΜL
LYMPHOCYTES RELATIVE PERCENT: 19.4 %
MCH RBC QN AUTO: 28.9 PG
MCHC RBC AUTO-ENTMCNC: 32.6 G/DL
MCV RBC AUTO: 88.8 FL
MONOCYTES ABSOLUTE: 624 /ΜL
MONOCYTES RELATIVE PERCENT: 7.9 %
NEUTROPHILS ABSOLUTE: 5404 /ΜL
NEUTROPHILS RELATIVE PERCENT: 68.4 %
PDW BLD-RTO: 15.9 %
PLATELET # BLD: 206 K/ΜL
PMV BLD AUTO: 10.2 FL
POTASSIUM SERPL-SCNC: 4 MMOL/L
RBC # BLD: 3.94 10^6/ΜL
SODIUM BLD-SCNC: 144 MMOL/L
TOTAL IRON BINDING CAPACITY: 300
TOTAL PROTEIN: 6.7
WBC # BLD: 7.9 10^3/ML

## 2024-03-18 PROBLEM — R94.6 ABNORMAL THYROID HORMONE METABOLISM: Status: ACTIVE | Noted: 2023-08-21

## 2024-03-18 PROBLEM — E04.1 THYROID NODULE: Status: ACTIVE | Noted: 2023-08-09

## 2024-03-18 RX ORDER — FLECAINIDE ACETATE 100 MG/1
100 TABLET ORAL
COMMUNITY
End: 2024-03-25

## 2024-03-18 RX ORDER — LORATADINE 10 MG/1
10 TABLET ORAL DAILY
COMMUNITY
End: 2024-03-25

## 2024-03-23 ENCOUNTER — HOSPITAL ENCOUNTER (OUTPATIENT)
Age: 69
Discharge: HOME | End: 2024-03-23
Payer: MEDICARE

## 2024-03-23 DIAGNOSIS — N18.32 STAGE 3B CHRONIC KIDNEY DISEASE (HCC): ICD-10-CM

## 2024-03-23 LAB
ANION GAP SERPL CALCULATED.3IONS-SCNC: 12 MMOL/L (ref 7–16)
BUN SERPL-MCNC: 24 MG/DL (ref 6–23)
CALCIUM SERPL-MCNC: 8.9 MG/DL (ref 8.6–10.2)
CHLORIDE SERPL-SCNC: 106 MMOL/L (ref 98–107)
CO2 SERPL-SCNC: 26 MMOL/L (ref 22–29)
CREAT SERPL-MCNC: 1.3 MG/DL (ref 0.5–1)
GFR SERPL CREATININE-BSD FRML MDRD: 42 ML/MIN/1.73M2
GLUCOSE SERPL-MCNC: 101 MG/DL (ref 74–99)
POTASSIUM SERPL-SCNC: 3.9 MMOL/L (ref 3.5–5)
SODIUM SERPL-SCNC: 144 MMOL/L (ref 132–146)

## 2024-03-23 PROCEDURE — 80048 BASIC METABOLIC PNL TOTAL CA: CPT

## 2024-03-23 PROCEDURE — 36415 COLL VENOUS BLD VENIPUNCTURE: CPT

## 2024-03-24 NOTE — PROGRESS NOTES
[facility] Physicians - [facility] Internal Medicine      SUBJECTIVE:  Malinda Stewart (:  1955) is a 69 y.o. female here for evaluation of the following chief complaint(s):  Hypertension and Follow-up (Pt was in Ed for A fib - states no incidents since then. And has been feeling ok)  Amiodarone toxicity - legs feel better.  Still feels that she has a belt around her upper abdomen.    Check lipase   Continue current dose of amiodarone.    If lipase still elevated, will need to check CT scan of abdomen.     DAVID - with stage 3b CKD.  Improved from previous but not back to normal.  Is currently on torsemide 10 mg every other day.  Malinda was monitoring her weight. Saw nephro on 3/20/2024.  Torsemide was increased to 20 mg daily.  Farxiga was started at 10 mg daily.  She is to have a repeat BMP in 2 weeks.  Follow-up with nephrology in 3 months.    Continue management as per nephrology.     Pulmonary HTN - had follow-up with [facility] pulmonary on 3/22/2024.  Will be having repeat R heart catheterization to re-assess pulmonary vasculature.. Awaiting date for this study.     Per pulmonary at [facility].     Cough - Allegra and flonase were added pm 3/11/2024. Has been on PPI as well. This cough has been better.  This has now gone away for the most part.    Continue present management as this may represent rhinitis.      Atrial fibrillation - on rivaroxaban.. In E R 3/12/2024 had RVR.  Waitted 90 minutes and went to ED. This stopped spontaneously.  Was watched for a few hours. Current medications include amidodarone, rivaroxaban, and metoprolol, 25 mg twice daily was added. Will be getting ZioPatch and repeat visit in 2 weeks.    Continue present management.      Thyroid nodule - This is being followed by Dr. De La Cruz's office.  We will re-request Dr. De La Cruz's records for update on plan of care.   Per endocrine management.      GERD - on pantoprazole. Stable.    Continue PPI    Multiple sclerosis - needs to have

## 2024-03-25 ENCOUNTER — OFFICE VISIT (OUTPATIENT)
Dept: INTERNAL MEDICINE | Age: 69
End: 2024-03-25
Payer: MEDICARE

## 2024-03-25 VITALS
DIASTOLIC BLOOD PRESSURE: 70 MMHG | HEART RATE: 54 BPM | TEMPERATURE: 97 F | HEIGHT: 65 IN | WEIGHT: 192 LBS | SYSTOLIC BLOOD PRESSURE: 154 MMHG | OXYGEN SATURATION: 98 % | RESPIRATION RATE: 18 BRPM | BODY MASS INDEX: 31.99 KG/M2

## 2024-03-25 DIAGNOSIS — I50.22 CHRONIC SYSTOLIC (CONGESTIVE) HEART FAILURE (HCC): ICD-10-CM

## 2024-03-25 DIAGNOSIS — R10.13 EPIGASTRIC PAIN: ICD-10-CM

## 2024-03-25 DIAGNOSIS — M25.512 CHRONIC LEFT SHOULDER PAIN: Primary | ICD-10-CM

## 2024-03-25 DIAGNOSIS — G89.29 CHRONIC LEFT SHOULDER PAIN: Primary | ICD-10-CM

## 2024-03-25 LAB — LIPASE: 55 U/L (ref 13–60)

## 2024-03-25 PROCEDURE — G8400 PT W/DXA NO RESULTS DOC: HCPCS | Performed by: INTERNAL MEDICINE

## 2024-03-25 PROCEDURE — G8427 DOCREV CUR MEDS BY ELIG CLIN: HCPCS | Performed by: INTERNAL MEDICINE

## 2024-03-25 PROCEDURE — 3017F COLORECTAL CA SCREEN DOC REV: CPT | Performed by: INTERNAL MEDICINE

## 2024-03-25 PROCEDURE — 3077F SYST BP >= 140 MM HG: CPT | Performed by: INTERNAL MEDICINE

## 2024-03-25 PROCEDURE — G8417 CALC BMI ABV UP PARAM F/U: HCPCS | Performed by: INTERNAL MEDICINE

## 2024-03-25 PROCEDURE — 1036F TOBACCO NON-USER: CPT | Performed by: INTERNAL MEDICINE

## 2024-03-25 PROCEDURE — 3078F DIAST BP <80 MM HG: CPT | Performed by: INTERNAL MEDICINE

## 2024-03-25 PROCEDURE — 1090F PRES/ABSN URINE INCON ASSESS: CPT | Performed by: INTERNAL MEDICINE

## 2024-03-25 PROCEDURE — 36415 COLL VENOUS BLD VENIPUNCTURE: CPT | Performed by: INTERNAL MEDICINE

## 2024-03-25 PROCEDURE — 1123F ACP DISCUSS/DSCN MKR DOCD: CPT | Performed by: INTERNAL MEDICINE

## 2024-03-25 PROCEDURE — 99204 OFFICE O/P NEW MOD 45 MIN: CPT | Performed by: INTERNAL MEDICINE

## 2024-03-25 PROCEDURE — G8484 FLU IMMUNIZE NO ADMIN: HCPCS | Performed by: INTERNAL MEDICINE

## 2024-03-25 PROCEDURE — 99214 OFFICE O/P EST MOD 30 MIN: CPT | Performed by: INTERNAL MEDICINE

## 2024-03-25 RX ORDER — BISACODYL 5 MG/1
5 TABLET, DELAYED RELEASE ORAL DAILY PRN
COMMUNITY

## 2024-03-25 RX ORDER — M-VIT,TX,IRON,MINS/CALC/FOLIC 27MG-0.4MG
1 TABLET ORAL DAILY
COMMUNITY

## 2024-03-25 RX ORDER — FLUTICASONE PROPIONATE 50 MCG
2 SPRAY, SUSPENSION (ML) NASAL DAILY
COMMUNITY

## 2024-03-25 RX ORDER — PANTOPRAZOLE SODIUM 40 MG/1
40 TABLET, DELAYED RELEASE ORAL 2 TIMES DAILY
Qty: 180 TABLET | Refills: 1 | Status: SHIPPED | OUTPATIENT
Start: 2024-03-25

## 2024-03-25 RX ORDER — FEXOFENADINE HCL 180 MG/1
180 TABLET ORAL DAILY
Qty: 30 TABLET | Refills: 0 | Status: CANCELLED | OUTPATIENT
Start: 2024-03-25 | End: 2024-04-24

## 2024-03-25 RX ORDER — DAPAGLIFLOZIN 10 MG/1
10 TABLET, FILM COATED ORAL EVERY MORNING
COMMUNITY

## 2024-03-29 ENCOUNTER — HOSPITAL ENCOUNTER (OUTPATIENT)
Age: 69
End: 2024-03-29
Payer: MEDICARE

## 2024-03-29 ENCOUNTER — HOSPITAL ENCOUNTER (OUTPATIENT)
Dept: GENERAL RADIOLOGY | Age: 69
End: 2024-03-29
Payer: MEDICARE

## 2024-03-29 DIAGNOSIS — G89.29 CHRONIC LEFT SHOULDER PAIN: ICD-10-CM

## 2024-03-29 DIAGNOSIS — M25.512 CHRONIC LEFT SHOULDER PAIN: ICD-10-CM

## 2024-03-29 PROCEDURE — 73030 X-RAY EXAM OF SHOULDER: CPT

## 2024-04-05 ENCOUNTER — HOSPITAL ENCOUNTER (OUTPATIENT)
Age: 69
Discharge: HOME | End: 2024-04-05
Payer: MEDICARE

## 2024-04-05 LAB
ANION GAP SERPL CALCULATED.3IONS-SCNC: 14 MMOL/L (ref 7–16)
BUN SERPL-MCNC: 27 MG/DL (ref 6–23)
CALCIUM SERPL-MCNC: 9.4 MG/DL (ref 8.6–10.2)
CHLORIDE SERPL-SCNC: 105 MMOL/L (ref 98–107)
CO2 SERPL-SCNC: 25 MMOL/L (ref 22–29)
CREAT SERPL-MCNC: 1.6 MG/DL (ref 0.5–1)
GFR SERPL CREATININE-BSD FRML MDRD: 34 ML/MIN/1.73M2
GLUCOSE SERPL-MCNC: 103 MG/DL (ref 74–99)
POTASSIUM SERPL-SCNC: 4.1 MMOL/L (ref 3.5–5)
SODIUM SERPL-SCNC: 144 MMOL/L (ref 132–146)

## 2024-04-05 PROCEDURE — 80048 BASIC METABOLIC PNL TOTAL CA: CPT

## 2024-04-05 PROCEDURE — 36415 COLL VENOUS BLD VENIPUNCTURE: CPT

## 2024-04-07 ENCOUNTER — OFFICE VISIT (OUTPATIENT)
Dept: URGENT CARE | Facility: CLINIC | Age: 69
End: 2024-04-07
Payer: MEDICARE

## 2024-04-07 VITALS
RESPIRATION RATE: 20 BRPM | DIASTOLIC BLOOD PRESSURE: 70 MMHG | TEMPERATURE: 98 F | SYSTOLIC BLOOD PRESSURE: 160 MMHG | HEART RATE: 56 BPM | OXYGEN SATURATION: 100 %

## 2024-04-07 DIAGNOSIS — K08.89 ODONTALGIA: Primary | ICD-10-CM

## 2024-04-07 PROCEDURE — 1125F AMNT PAIN NOTED PAIN PRSNT: CPT | Performed by: PHYSICIAN ASSISTANT

## 2024-04-07 PROCEDURE — 99203 OFFICE O/P NEW LOW 30 MIN: CPT | Performed by: PHYSICIAN ASSISTANT

## 2024-04-07 RX ORDER — CHLORHEXIDINE GLUCONATE ORAL RINSE 1.2 MG/ML
SOLUTION DENTAL
Qty: 240 ML | Refills: 0 | Status: SHIPPED | OUTPATIENT
Start: 2024-04-07

## 2024-04-07 RX ORDER — PENICILLIN V POTASSIUM 500 MG/1
500 TABLET, FILM COATED ORAL 4 TIMES DAILY
Qty: 28 TABLET | Refills: 0 | Status: SHIPPED | OUTPATIENT
Start: 2024-04-07 | End: 2024-04-14

## 2024-04-07 ASSESSMENT — ENCOUNTER SYMPTOMS
SORE THROAT: 0
GASTROINTESTINAL NEGATIVE: 1
RESPIRATORY NEGATIVE: 1
ENDOCRINE NEGATIVE: 1
EYES NEGATIVE: 1
FEVER: 0
HEMATOLOGIC/LYMPHATIC NEGATIVE: 1
PSYCHIATRIC NEGATIVE: 1
NEUROLOGICAL NEGATIVE: 1
MUSCULOSKELETAL NEGATIVE: 1
CARDIOVASCULAR NEGATIVE: 1
ALLERGIC/IMMUNOLOGIC NEGATIVE: 1

## 2024-04-07 ASSESSMENT — PAIN SCALES - GENERAL: PAINLEVEL: 9

## 2024-04-07 NOTE — PATIENT INSTRUCTIONS
Tylenol as directed for pain  Good oral hygiene  Dental follow up early this week  ER visit anytime 24/7 for acute worsening or changing condition

## 2024-04-07 NOTE — PROGRESS NOTES
Subjective   Patient ID: Tamra Gaona is a 69 y.o. female.      History provided by:  Patient   used: No      This is a 69 yr old female here for left lower tooth pain x 2 days. Left lower jaw pain and some jaw swelling noted. No sore throat, fever or ear pain.     Review of Systems   Constitutional:  Negative for fever.   HENT:  Positive for dental problem. Negative for ear pain and sore throat.    Eyes: Negative.    Respiratory: Negative.     Cardiovascular: Negative.    Gastrointestinal: Negative.    Endocrine: Negative.    Genitourinary: Negative.    Musculoskeletal: Negative.    Skin: Negative.    Allergic/Immunologic: Negative.    Neurological: Negative.    Hematological: Negative.    Psychiatric/Behavioral: Negative.     All other systems reviewed and are negative.  /70   Pulse 56   Temp 36.7 °C (98 °F)   Resp 20   SpO2 100%     Objective   Physical Exam  Vitals and nursing note reviewed.   Constitutional:       Appearance: Normal appearance.   HENT:      Head: Normocephalic and atraumatic.      Right Ear: Tympanic membrane and ear canal normal.      Left Ear: Tympanic membrane and ear canal normal.      Mouth/Throat:      Mouth: Mucous membranes are moist.      Pharynx: Oropharynx is clear.      Comments: Left lower molar pain with palpation  Cardiovascular:      Rate and Rhythm: Normal rate and regular rhythm.   Pulmonary:      Effort: Pulmonary effort is normal.      Breath sounds: Normal breath sounds.   Musculoskeletal:      Cervical back: Neck supple.   Lymphadenopathy:      Cervical: No cervical adenopathy.   Skin:     General: Skin is warm and dry.   Neurological:      General: No focal deficit present.      Mental Status: She is alert and oriented to person, place, and time.   Psychiatric:         Mood and Affect: Mood normal.         Behavior: Behavior normal.     Assessment:  Odontalgia    Plan:  PCN  mg qid x 7 days  Peridex mouth rinse bid  Tylenol as  directed for pain  Good oral hygiene  Dental follow up this week  ER visit anytime 24/7 for acte worsening or changing condition

## 2024-05-07 NOTE — PROGRESS NOTES
[facility] Physicians - [facility] Internal Medicine      SUBJECTIVE:  Malinda Stewart (:  1955) is a 69 y.o. female here for evaluation of the following chief complaint(s):  Hypertension and Hyperlipidemia  Abdominal discomfort - lipase normalized on last check of lab on 3/25/2024. Much better.    Monitor for worsening.     DAVID - stage 3 b CKD - Follows with nephrology and due for visit in . Increased BUN and creatinine on 2024.    Continue follow-up with nephrology and optimize cardiac function with medications as currently prescribed.     Pulmonary HTN - Repeat R heart catheterization was performed at [facility] on 2024: Results as taken from Dr. Barraza note of same day:    Summary: Combined pre and postcapillary pulmonary hypertension with preserved cardiac index at rest. Large V wave in the PAWP waveform that worsened with inhaled nitric oxide (likely related LVDD as normal LVEF on echo (2024)).    Erintch subsequently ordered.  She is to return in 8 weeks. Was to be seen by NP in 2 weeks. Rexopatch alerted for SVT of 168 beats for 60 seconds. Has appointment with cardiology on 2024. Metoprolol was increased to 37.5 mg twice daily. Has follow-up with pulmonary on 2024. Feeling a bit tired on new dose of metoprolol.    On farxiga, 10 mg , torsemide, 10 mg every other day.   Await further cardiology opinion on SVT treatment.   Monitor symptoms on B-blocker.     Allergic rhinitis - was placed on Allegra and fluticasone nasal spray. These have helped.    Continue same.     Atrial fibrillation - on rivaroxaban and metoprolol. Continues on amiodarone, 200 mg daily.    Continue same and plan as above with cardiology at [facility].     L arm pain - ? Impingement on X-ray.  Was referred to PT at last visit. OT is helping.  US treatment at therapy is helping.    Refer to Dr. Talbert for injection.     Anemia - Iron deficiency with mild anemia on last check Hb (11.4). On iron, 325

## 2024-05-08 ENCOUNTER — OFFICE VISIT (OUTPATIENT)
Dept: INTERNAL MEDICINE | Age: 69
End: 2024-05-08
Payer: MEDICARE

## 2024-05-08 VITALS
HEART RATE: 50 BPM | WEIGHT: 192 LBS | OXYGEN SATURATION: 98 % | RESPIRATION RATE: 18 BRPM | TEMPERATURE: 97.1 F | SYSTOLIC BLOOD PRESSURE: 148 MMHG | BODY MASS INDEX: 31.99 KG/M2 | HEIGHT: 65 IN | DIASTOLIC BLOOD PRESSURE: 66 MMHG

## 2024-05-08 DIAGNOSIS — M25.812 IMPINGEMENT OF LEFT SHOULDER: ICD-10-CM

## 2024-05-08 DIAGNOSIS — Z78.0 ASYMPTOMATIC MENOPAUSAL STATE: ICD-10-CM

## 2024-05-08 DIAGNOSIS — Z12.31 ENCOUNTER FOR SCREENING MAMMOGRAM FOR MALIGNANT NEOPLASM OF BREAST: ICD-10-CM

## 2024-05-08 DIAGNOSIS — Z79.51: ICD-10-CM

## 2024-05-08 DIAGNOSIS — Z11.59 NEED FOR HEPATITIS C SCREENING TEST: Primary | ICD-10-CM

## 2024-05-08 PROCEDURE — 99213 OFFICE O/P EST LOW 20 MIN: CPT | Performed by: INTERNAL MEDICINE

## 2024-05-08 PROCEDURE — 3077F SYST BP >= 140 MM HG: CPT | Performed by: INTERNAL MEDICINE

## 2024-05-08 PROCEDURE — 3017F COLORECTAL CA SCREEN DOC REV: CPT | Performed by: INTERNAL MEDICINE

## 2024-05-08 PROCEDURE — 1036F TOBACCO NON-USER: CPT | Performed by: INTERNAL MEDICINE

## 2024-05-08 PROCEDURE — G8400 PT W/DXA NO RESULTS DOC: HCPCS | Performed by: INTERNAL MEDICINE

## 2024-05-08 PROCEDURE — G8427 DOCREV CUR MEDS BY ELIG CLIN: HCPCS | Performed by: INTERNAL MEDICINE

## 2024-05-08 PROCEDURE — 1123F ACP DISCUSS/DSCN MKR DOCD: CPT | Performed by: INTERNAL MEDICINE

## 2024-05-08 PROCEDURE — 1090F PRES/ABSN URINE INCON ASSESS: CPT | Performed by: INTERNAL MEDICINE

## 2024-05-08 PROCEDURE — 3078F DIAST BP <80 MM HG: CPT | Performed by: INTERNAL MEDICINE

## 2024-05-08 PROCEDURE — 99214 OFFICE O/P EST MOD 30 MIN: CPT | Performed by: INTERNAL MEDICINE

## 2024-05-08 PROCEDURE — G8417 CALC BMI ABV UP PARAM F/U: HCPCS | Performed by: INTERNAL MEDICINE

## 2024-05-08 RX ORDER — METOPROLOL SUCCINATE 25 MG/1
37.5 TABLET, EXTENDED RELEASE ORAL 2 TIMES DAILY
COMMUNITY
Start: 2024-05-07 | End: 2024-05-08 | Stop reason: SDUPTHER

## 2024-05-08 RX ORDER — TORSEMIDE 20 MG/1
20 TABLET ORAL DAILY
COMMUNITY
Start: 2024-03-27 | End: 2024-05-08 | Stop reason: SDUPTHER

## 2024-05-08 RX ORDER — METOPROLOL SUCCINATE 25 MG/1
37.5 TABLET, EXTENDED RELEASE ORAL 2 TIMES DAILY
Qty: 135 TABLET | Refills: 1 | Status: SHIPPED | OUTPATIENT
Start: 2024-05-08

## 2024-05-08 RX ORDER — FLUCONAZOLE 150 MG/1
150 TABLET ORAL ONCE
Qty: 1 TABLET | Refills: 0 | Status: SHIPPED | OUTPATIENT
Start: 2024-05-08 | End: 2024-05-08

## 2024-05-08 RX ORDER — ATORVASTATIN CALCIUM 10 MG/1
10 TABLET, FILM COATED ORAL DAILY
Qty: 90 TABLET | Refills: 1 | Status: SHIPPED | OUTPATIENT
Start: 2024-05-08

## 2024-05-08 RX ORDER — FERROUS SULFATE 325(65) MG
325 TABLET ORAL
Qty: 90 TABLET | Refills: 1 | Status: SHIPPED | OUTPATIENT
Start: 2024-05-08

## 2024-05-08 RX ORDER — TORSEMIDE 20 MG/1
20 TABLET ORAL DAILY
Qty: 90 TABLET | Refills: 1 | Status: SHIPPED | OUTPATIENT
Start: 2024-05-08

## 2024-05-13 SDOH — HEALTH STABILITY: PHYSICAL HEALTH: ON AVERAGE, HOW MANY MINUTES DO YOU ENGAGE IN EXERCISE AT THIS LEVEL?: 0 MIN

## 2024-05-13 SDOH — HEALTH STABILITY: PHYSICAL HEALTH: ON AVERAGE, HOW MANY DAYS PER WEEK DO YOU ENGAGE IN MODERATE TO STRENUOUS EXERCISE (LIKE A BRISK WALK)?: 0 DAYS

## 2024-05-14 ENCOUNTER — OFFICE VISIT (OUTPATIENT)
Dept: ORTHOPEDIC SURGERY | Age: 69
End: 2024-05-14
Payer: MEDICARE

## 2024-05-14 VITALS — WEIGHT: 192 LBS | BODY MASS INDEX: 31.99 KG/M2 | HEIGHT: 65 IN

## 2024-05-14 DIAGNOSIS — G89.29 CHRONIC LEFT SHOULDER PAIN: Primary | ICD-10-CM

## 2024-05-14 DIAGNOSIS — M75.122 NONTRAUMATIC COMPLETE TEAR OF LEFT ROTATOR CUFF: ICD-10-CM

## 2024-05-14 DIAGNOSIS — M25.512 CHRONIC LEFT SHOULDER PAIN: Primary | ICD-10-CM

## 2024-05-14 PROCEDURE — G8400 PT W/DXA NO RESULTS DOC: HCPCS | Performed by: FAMILY MEDICINE

## 2024-05-14 PROCEDURE — 3017F COLORECTAL CA SCREEN DOC REV: CPT | Performed by: FAMILY MEDICINE

## 2024-05-14 PROCEDURE — 99204 OFFICE O/P NEW MOD 45 MIN: CPT | Performed by: FAMILY MEDICINE

## 2024-05-14 PROCEDURE — 1090F PRES/ABSN URINE INCON ASSESS: CPT | Performed by: FAMILY MEDICINE

## 2024-05-14 PROCEDURE — 1036F TOBACCO NON-USER: CPT | Performed by: FAMILY MEDICINE

## 2024-05-14 PROCEDURE — 1123F ACP DISCUSS/DSCN MKR DOCD: CPT | Performed by: FAMILY MEDICINE

## 2024-05-14 PROCEDURE — G8417 CALC BMI ABV UP PARAM F/U: HCPCS | Performed by: FAMILY MEDICINE

## 2024-05-14 PROCEDURE — 20611 DRAIN/INJ JOINT/BURSA W/US: CPT | Performed by: FAMILY MEDICINE

## 2024-05-14 PROCEDURE — G8427 DOCREV CUR MEDS BY ELIG CLIN: HCPCS | Performed by: FAMILY MEDICINE

## 2024-05-14 RX ORDER — LIDOCAINE HYDROCHLORIDE 10 MG/ML
3 INJECTION, SOLUTION INFILTRATION; PERINEURAL ONCE
Status: COMPLETED | OUTPATIENT
Start: 2024-05-14 | End: 2024-05-14

## 2024-05-14 RX ORDER — BETAMETHASONE SODIUM PHOSPHATE AND BETAMETHASONE ACETATE 3; 3 MG/ML; MG/ML
6 INJECTION, SUSPENSION INTRA-ARTICULAR; INTRALESIONAL; INTRAMUSCULAR; SOFT TISSUE ONCE
Status: COMPLETED | OUTPATIENT
Start: 2024-05-14 | End: 2024-05-14

## 2024-05-14 RX ADMIN — LIDOCAINE HYDROCHLORIDE 3 ML: 10 INJECTION, SOLUTION INFILTRATION; PERINEURAL at 08:57

## 2024-05-14 RX ADMIN — BETAMETHASONE SODIUM PHOSPHATE AND BETAMETHASONE ACETATE 6 MG: 3; 3 INJECTION, SUSPENSION INTRA-ARTICULAR; INTRALESIONAL; INTRAMUSCULAR; SOFT TISSUE at 08:56

## 2024-05-14 NOTE — PROGRESS NOTES
PROCEDURE NOTE:    DIAGNOSIS      LEFT shoulder pain.     PROCEDURE     Ultrasound-guided LEFT subacromial/subdeltoid bursa corticosteroid injection.     PROCEDURAL PAUSE     Procedural pause conducted to verify: ?correct patient identity, procedure to be performed, and as applicable, correct side and site, correct patient position, and availability of implants, special equipment, or special requirements.     PROCEDURE DETAILS     The procedure was carried out under sterile technique.      Patient Position: ?Supine.     Localization Process: ?The subacromial/subdeltoid bursa was evaluated under ultrasound prior to starting the procedure. ?The skin was prepped with Betadine and Alcohol.    Approach: ?In-plane.     Local Anesthesia: ?Local anesthesia was obtained with vapocoolant cold spray and 1 cc of 1% lidocaine using a 30-gauge 1-1/4-inch needle to create a skin wheal. ?     Injection/Aspiration: ?A 25-gauge, 2-inch needle was advanced from an in-plane approach into the subacromial/subdeltoid bursa. ?After visualization of the needle tip in the target area and negative aspiration for blood, a mixture of 3 cc of 1% lidocaine and 1 cc of betamethasone (6 mg/cc) was injected into the subacromial bursa with excellent sonographic flow. ?Images of procedure were permanently recorded.    Postprocedure Care: ?The patient will avoid heavy exertion with the shoulder and avoid soaking the shoulder under water for two days. ?The patient will contact me with any problems related to the injection.     PATIENT EDUCATION     Ready to learn, no apparent learning barriers were identified; learning preferences include listening. ?Explained diagnosis and treatment plan; patient expressed understanding of the content.     INFORMED CONSENT     Discussed the risks, benefits, alternatives, and the necessity of other members of the healthcare team participating in the procedure. ?All questions answered and consent given.     Following 
deficits detected.  Musculoskeletal: LEFT Shoulder:  ROM: FF 90, ABD 90, ER 75, IR Greater Trochanter.  No obvious bony deformity.  No ecchymosis.  TTP: ( - ) AC joint, ( - ) Biceps, ( - ) Coracoid, ( - ) Posterior Joint Line  Impingement Tests: ( + ) Chadwick, ( + ) Neer's  Labrum: ( - ) Wheatland's, ( + ) Speeds  Rotator cuff: ( + ) Full can, ( - ) Bear hug, ( - ) Belly press, ( - ) ER weakness   ______________________________________________________________________    Assessment & Plan :    1. Chronic left shoulder pain  2. Nontraumatic complete tear of left rotator cuff  Patient presents to the office today for evaluation of left shoulder pain.  History, referring provider note, physical exam and imaging (as interpreted by me) are consistent with chronic nontraumatic likely complete tear of the left rotator cuff.  Treatment options discussed with patient in the office today including activity modification, oral anti-inflammatories, physical therapy, injection options, advanced imaging in the form of a MRI and referral to an orthopedic surgeon for discussion of surgical opinion. Patient wishes to proceed with conservative treatment in the form of an ultrasound-guided corticosteroid injection which was performed in the office today, please see separate procedure note for further details.  Patient will follow up in 6 weeks for reevaluation of symptoms and consider escalation therapy should symptoms persist.  Patient is agreeable with above plan all questions and concerns were addressed in the office today.     - US ARTHR/ASP/INJ MAJOR JNT/BURSA LEFT; Future  - lidocaine 1 % injection 3 mL  - betamethasone acetate-betamethasone sodium phosphate (CELESTONE) injection 6 mg    Return to Office: Return in about 6 weeks (around 6/25/2024) for injection follow up.    Niles Talbert MD

## 2024-05-29 RX ORDER — AMIODARONE HYDROCHLORIDE 200 MG/1
200 TABLET ORAL DAILY
Qty: 90 TABLET | Refills: 1 | Status: CANCELLED | OUTPATIENT
Start: 2024-05-29

## 2024-05-29 NOTE — TELEPHONE ENCOUNTER
----- Message from Malinda Stewart sent at 5/29/2024 10:15 AM EDT -----  Regarding: Refill for Amiodarone 200 daily  Contact: [phone]  Dr Youngblood, I didn’t realize I didn’t have any refills on this med until after my appointment and I only have 19 pills left. If you would not mind sending an order to [facility] on [address]. Thank you so much.Sorry I didn’t know when I came in for my appointment.

## 2024-05-31 RX ORDER — METOPROLOL SUCCINATE 25 MG/1
37.5 TABLET, EXTENDED RELEASE ORAL 2 TIMES DAILY
Qty: 135 TABLET | Refills: 1 | OUTPATIENT
Start: 2024-05-31

## 2024-06-03 RX ORDER — AMIODARONE HYDROCHLORIDE 200 MG/1
200 TABLET ORAL DAILY
Qty: 90 TABLET | Refills: 1 | Status: SHIPPED | OUTPATIENT
Start: 2024-06-03

## 2024-06-20 ENCOUNTER — OFFICE VISIT (OUTPATIENT)
Dept: INTERNAL MEDICINE | Age: 69
End: 2024-06-20
Payer: MEDICARE

## 2024-06-20 VITALS
HEIGHT: 65 IN | RESPIRATION RATE: 18 BRPM | DIASTOLIC BLOOD PRESSURE: 73 MMHG | WEIGHT: 182 LBS | TEMPERATURE: 97 F | SYSTOLIC BLOOD PRESSURE: 178 MMHG | BODY MASS INDEX: 30.32 KG/M2 | OXYGEN SATURATION: 98 % | HEART RATE: 51 BPM

## 2024-06-20 DIAGNOSIS — I27.20 SEVERE PULMONARY HYPERTENSION (HCC): Primary | ICD-10-CM

## 2024-06-20 PROCEDURE — 1090F PRES/ABSN URINE INCON ASSESS: CPT | Performed by: INTERNAL MEDICINE

## 2024-06-20 PROCEDURE — 3077F SYST BP >= 140 MM HG: CPT | Performed by: INTERNAL MEDICINE

## 2024-06-20 PROCEDURE — G8400 PT W/DXA NO RESULTS DOC: HCPCS | Performed by: INTERNAL MEDICINE

## 2024-06-20 PROCEDURE — G8427 DOCREV CUR MEDS BY ELIG CLIN: HCPCS | Performed by: INTERNAL MEDICINE

## 2024-06-20 PROCEDURE — 1123F ACP DISCUSS/DSCN MKR DOCD: CPT | Performed by: INTERNAL MEDICINE

## 2024-06-20 PROCEDURE — 99214 OFFICE O/P EST MOD 30 MIN: CPT | Performed by: INTERNAL MEDICINE

## 2024-06-20 PROCEDURE — 1036F TOBACCO NON-USER: CPT | Performed by: INTERNAL MEDICINE

## 2024-06-20 PROCEDURE — 3017F COLORECTAL CA SCREEN DOC REV: CPT | Performed by: INTERNAL MEDICINE

## 2024-06-20 PROCEDURE — 3078F DIAST BP <80 MM HG: CPT | Performed by: INTERNAL MEDICINE

## 2024-06-20 PROCEDURE — G8417 CALC BMI ABV UP PARAM F/U: HCPCS | Performed by: INTERNAL MEDICINE

## 2024-06-20 PROCEDURE — 99213 OFFICE O/P EST LOW 20 MIN: CPT | Performed by: INTERNAL MEDICINE

## 2024-06-20 RX ORDER — METOPROLOL SUCCINATE 25 MG/1
25 TABLET, EXTENDED RELEASE ORAL NIGHTLY
COMMUNITY

## 2024-06-20 RX ORDER — FEXOFENADINE HCL 180 MG/1
180 TABLET ORAL DAILY
COMMUNITY
End: 2024-06-20 | Stop reason: SDUPTHER

## 2024-06-20 RX ORDER — FEXOFENADINE HCL 180 MG/1
180 TABLET ORAL DAILY
Qty: 90 TABLET | Refills: 1 | Status: SHIPPED | OUTPATIENT
Start: 2024-06-20

## 2024-06-26 DIAGNOSIS — Z79.51: Primary | ICD-10-CM

## 2024-06-26 DIAGNOSIS — Z78.0 ASYMPTOMATIC MENOPAUSAL STATE: ICD-10-CM

## 2024-06-27 ENCOUNTER — HOSPITAL ENCOUNTER (OUTPATIENT)
Dept: GENERAL RADIOLOGY | Age: 69
Discharge: HOME | End: 2024-06-29
Attending: INTERNAL MEDICINE
Payer: MEDICARE

## 2024-06-27 VITALS — BODY MASS INDEX: 31.49 KG/M2 | WEIGHT: 189 LBS | HEIGHT: 65 IN

## 2024-06-27 DIAGNOSIS — Z79.51: ICD-10-CM

## 2024-06-27 DIAGNOSIS — Z12.31 ENCOUNTER FOR SCREENING MAMMOGRAM FOR MALIGNANT NEOPLASM OF BREAST: ICD-10-CM

## 2024-06-27 DIAGNOSIS — Z78.0 ASYMPTOMATIC MENOPAUSAL STATE: ICD-10-CM

## 2024-06-27 PROCEDURE — 77080 DXA BONE DENSITY AXIAL: CPT

## 2024-06-27 PROCEDURE — 77063 BREAST TOMOSYNTHESIS BI: CPT

## 2024-07-03 ENCOUNTER — TELEPHONE (OUTPATIENT)
Dept: INTERNAL MEDICINE | Age: 69
End: 2024-07-03

## 2024-07-03 NOTE — TELEPHONE ENCOUNTER
----- Message from Any Youngblood MD sent at 7/2/2024  4:10 PM EDT -----  Bone density (DEXA scan) reviewed:  Please let Malinda know that she has osteopenia or thinning of the bones but does not have osteoporosis.  We treat this osteopenia with Calcium and Vitamin D. Dosing is a little different as we are closely watching her kidney function as well. Her total calcium intake should be (diet plus supplement) 1200 mg/day, with 500 mg/day OR LESS from supplement. I also recommend 800 international units of vitamin D daily as well. We will recheck this DEXA in 2 years' time.     Please let her know her mammogram was normal.     Any Youngblood MD  7/2/2024

## 2024-07-03 NOTE — TELEPHONE ENCOUNTER
Left message informed of results of Dexa Scan and given some advice on Calcium and Vit D supplements as advise.   Also informed mammogram was normal and  repeat Dexa  Scan will be done in 2 years any further questions she can reach out and call office

## 2024-07-15 ENCOUNTER — HOSPITAL ENCOUNTER (OUTPATIENT)
Age: 69
Discharge: HOME | End: 2024-07-15
Payer: MEDICARE

## 2024-07-15 LAB
25(OH)D3 SERPL-MCNC: 36.9 NG/ML (ref 30–100)
ALBUMIN SERPL-MCNC: 4 G/DL (ref 3.5–5.2)
ALP SERPL-CCNC: 99 U/L (ref 35–104)
ALT SERPL-CCNC: 15 U/L (ref 0–32)
ANION GAP SERPL CALCULATED.3IONS-SCNC: 13 MMOL/L (ref 7–16)
AST SERPL-CCNC: 16 U/L (ref 0–31)
BACTERIA URNS QL MICRO: ABNORMAL
BILIRUB SERPL-MCNC: 0.7 MG/DL (ref 0–1.2)
BILIRUB UR QL STRIP: NEGATIVE
BUN SERPL-MCNC: 26 MG/DL (ref 6–23)
CALCIUM SERPL-MCNC: 9.1 MG/DL (ref 8.6–10.2)
CHLORIDE SERPL-SCNC: 106 MMOL/L (ref 98–107)
CHOLEST SERPL-MCNC: 137 MG/DL
CLARITY UR: CLEAR
CO2 SERPL-SCNC: 25 MMOL/L (ref 22–29)
COLOR UR: YELLOW
CREAT SERPL-MCNC: 1.4 MG/DL (ref 0.5–1)
CREAT UR-MCNC: 115.3 MG/DL (ref 29–226)
EPI CELLS #/AREA URNS HPF: ABNORMAL /HPF
ERYTHROCYTE [DISTWIDTH] IN BLOOD BY AUTOMATED COUNT: 14.7 % (ref 11.5–15)
GFR, ESTIMATED: 42 ML/MIN/1.73M2
GLUCOSE SERPL-MCNC: 99 MG/DL (ref 74–99)
GLUCOSE UR STRIP-MCNC: NEGATIVE MG/DL
HBA1C MFR BLD: 4.9 % (ref 4–5.6)
HCT VFR BLD AUTO: 33.3 % (ref 34–48)
HDLC SERPL-MCNC: 54 MG/DL
HGB BLD-MCNC: 10.6 G/DL (ref 11.5–15.5)
HGB UR QL STRIP.AUTO: NEGATIVE
KETONES UR STRIP-MCNC: NEGATIVE MG/DL
LDLC SERPL CALC-MCNC: 72 MG/DL
LEUKOCYTE ESTERASE UR QL STRIP: NEGATIVE
MCH RBC QN AUTO: 31.4 PG (ref 26–35)
MCHC RBC AUTO-ENTMCNC: 31.8 G/DL (ref 32–34.5)
MCV RBC AUTO: 98.5 FL (ref 80–99.9)
MICROALBUMIN UR-MCNC: 23 MG/L (ref 0–19)
MICROALBUMIN/CREAT UR-RTO: 20 MCG/MG CREAT (ref 0–30)
NITRITE UR QL STRIP: NEGATIVE
PH UR STRIP: 6 [PH] (ref 5–9)
PHOSPHATE SERPL-MCNC: 3.9 MG/DL (ref 2.5–4.5)
PLATELET # BLD AUTO: 270 K/UL (ref 130–450)
PMV BLD AUTO: 10.4 FL (ref 7–12)
POTASSIUM SERPL-SCNC: 3.9 MMOL/L (ref 3.5–5)
PROT SERPL-MCNC: 7.2 G/DL (ref 6.4–8.3)
PROT UR STRIP-MCNC: NEGATIVE MG/DL
PTH-INTACT SERPL-MCNC: 166.3 PG/ML (ref 15–65)
RBC # BLD AUTO: 3.38 M/UL (ref 3.5–5.5)
RBC #/AREA URNS HPF: ABNORMAL /HPF
SODIUM SERPL-SCNC: 144 MMOL/L (ref 132–146)
SP GR UR STRIP: 1.02 (ref 1–1.03)
TRIGL SERPL-MCNC: 53 MG/DL
UROBILINOGEN UR STRIP-ACNC: 0.2 EU/DL (ref 0–1)
VLDLC SERPL CALC-MCNC: 11 MG/DL
WBC #/AREA URNS HPF: ABNORMAL /HPF
WBC OTHER # BLD: 8.4 K/UL (ref 4.5–11.5)

## 2024-07-15 PROCEDURE — 82570 ASSAY OF URINE CREATININE: CPT

## 2024-07-15 PROCEDURE — 80053 COMPREHEN METABOLIC PANEL: CPT

## 2024-07-15 PROCEDURE — 80061 LIPID PANEL: CPT

## 2024-07-15 PROCEDURE — 83036 HEMOGLOBIN GLYCOSYLATED A1C: CPT

## 2024-07-15 PROCEDURE — 36415 COLL VENOUS BLD VENIPUNCTURE: CPT

## 2024-07-15 PROCEDURE — 84100 ASSAY OF PHOSPHORUS: CPT

## 2024-07-15 PROCEDURE — 85027 COMPLETE CBC AUTOMATED: CPT

## 2024-07-15 PROCEDURE — 82306 VITAMIN D 25 HYDROXY: CPT

## 2024-07-15 PROCEDURE — 83970 ASSAY OF PARATHORMONE: CPT

## 2024-07-15 PROCEDURE — 81001 URINALYSIS AUTO W/SCOPE: CPT

## 2024-07-15 PROCEDURE — 82043 UR ALBUMIN QUANTITATIVE: CPT

## 2024-07-18 ENCOUNTER — HOSPITAL ENCOUNTER (OUTPATIENT)
Age: 69
Discharge: HOME | End: 2024-07-18
Payer: MEDICARE

## 2024-07-18 LAB
ANION GAP SERPL CALCULATED.3IONS-SCNC: 9 MMOL/L (ref 7–16)
BUN SERPL-MCNC: 22 MG/DL (ref 6–23)
CALCIUM SERPL-MCNC: 8.7 MG/DL (ref 8.6–10.2)
CHLORIDE SERPL-SCNC: 107 MMOL/L (ref 98–107)
CO2 SERPL-SCNC: 27 MMOL/L (ref 22–29)
CREAT SERPL-MCNC: 1.4 MG/DL (ref 0.5–1)
GFR, ESTIMATED: 40 ML/MIN/1.73M2
GLUCOSE SERPL-MCNC: 100 MG/DL (ref 74–99)
POTASSIUM SERPL-SCNC: 4 MMOL/L (ref 3.5–5)
SODIUM SERPL-SCNC: 143 MMOL/L (ref 132–146)

## 2024-07-18 PROCEDURE — 36415 COLL VENOUS BLD VENIPUNCTURE: CPT

## 2024-07-18 PROCEDURE — 80048 BASIC METABOLIC PNL TOTAL CA: CPT

## 2024-08-06 ENCOUNTER — HOSPITAL ENCOUNTER (OUTPATIENT)
Age: 69
Discharge: HOME | End: 2024-08-06
Payer: MEDICARE

## 2024-08-06 LAB
ANION GAP SERPL CALCULATED.3IONS-SCNC: 10 MMOL/L (ref 7–16)
BUN SERPL-MCNC: 30 MG/DL (ref 6–23)
CALCIUM SERPL-MCNC: 9.1 MG/DL (ref 8.6–10.2)
CHLORIDE SERPL-SCNC: 107 MMOL/L (ref 98–107)
CO2 SERPL-SCNC: 27 MMOL/L (ref 22–29)
CREAT SERPL-MCNC: 1.6 MG/DL (ref 0.5–1)
GFR, ESTIMATED: 35 ML/MIN/1.73M2
GLUCOSE SERPL-MCNC: 99 MG/DL (ref 74–99)
POTASSIUM SERPL-SCNC: 4.4 MMOL/L (ref 3.5–5)
SODIUM SERPL-SCNC: 144 MMOL/L (ref 132–146)

## 2024-08-06 PROCEDURE — 80048 BASIC METABOLIC PNL TOTAL CA: CPT

## 2024-08-06 PROCEDURE — 36415 COLL VENOUS BLD VENIPUNCTURE: CPT

## 2024-09-10 ENCOUNTER — NURSE ONLY (OUTPATIENT)
Dept: INTERNAL MEDICINE | Age: 69
End: 2024-09-10
Payer: MEDICARE

## 2024-09-10 DIAGNOSIS — Z23 FLU VACCINE NEED: Primary | ICD-10-CM

## 2024-09-10 PROBLEM — R91.8 LUNG NODULES: Status: ACTIVE | Noted: 2024-06-21

## 2024-09-10 PROBLEM — J43.2 CENTRILOBULAR EMPHYSEMA (HCC): Status: ACTIVE | Noted: 2024-06-21

## 2024-09-10 PROBLEM — K08.89 TOOTHACHE: Status: ACTIVE | Noted: 2024-04-07

## 2024-09-10 PROCEDURE — G0008 ADMIN INFLUENZA VIRUS VAC: HCPCS | Performed by: INTERNAL MEDICINE

## 2024-09-19 LAB
BASOPHILS ABSOLUTE: 62 /ΜL
BASOPHILS RELATIVE PERCENT: 0.7 %
EOSINOPHILS ABSOLUTE: 418 /ΜL
EOSINOPHILS RELATIVE PERCENT: 4.7 %
FERRITIN: 47 NG/ML (ref 9–150)
HCT VFR BLD CALC: 35.1 % (ref 36–46)
HEMOGLOBIN: 11.4 G/DL (ref 12–16)
IRON % SATURATION: 17
IRON: 56
LYMPHOCYTES ABSOLUTE: 1575 /ΜL
LYMPHOCYTES RELATIVE PERCENT: 17.7 %
MCH RBC QN AUTO: 30.2 PG
MCHC RBC AUTO-ENTMCNC: 32.5 G/DL
MCV RBC AUTO: 93.1 FL
MONOCYTES ABSOLUTE: 935 /ΜL
MONOCYTES RELATIVE PERCENT: 10.5 %
NEUTROPHILS ABSOLUTE: 5910 /ΜL
NEUTROPHILS RELATIVE PERCENT: 66.4 %
PLATELET # BLD: 258 K/ΜL
PMV BLD AUTO: 9 FL
RBC # BLD: 3.77 10^6/ΜL
TOTAL IRON BINDING CAPACITY: 332
WBC # BLD: 8.9 10^3/ML

## 2024-09-27 RX ORDER — PANTOPRAZOLE SODIUM 40 MG/1
TABLET, DELAYED RELEASE ORAL
Qty: 180 TABLET | Refills: 1 | Status: SHIPPED | OUTPATIENT
Start: 2024-09-27

## 2024-09-29 SDOH — HEALTH STABILITY: PHYSICAL HEALTH: ON AVERAGE, HOW MANY MINUTES DO YOU ENGAGE IN EXERCISE AT THIS LEVEL?: 40 MIN

## 2024-09-29 SDOH — HEALTH STABILITY: PHYSICAL HEALTH: ON AVERAGE, HOW MANY DAYS PER WEEK DO YOU ENGAGE IN MODERATE TO STRENUOUS EXERCISE (LIKE A BRISK WALK)?: 1 DAY

## 2024-09-29 ASSESSMENT — LIFESTYLE VARIABLES
HOW OFTEN DO YOU HAVE A DRINK CONTAINING ALCOHOL: 1
HOW MANY STANDARD DRINKS CONTAINING ALCOHOL DO YOU HAVE ON A TYPICAL DAY: 0
HOW MANY STANDARD DRINKS CONTAINING ALCOHOL DO YOU HAVE ON A TYPICAL DAY: PATIENT DOES NOT DRINK
HOW OFTEN DO YOU HAVE SIX OR MORE DRINKS ON ONE OCCASION: 1
HOW OFTEN DO YOU HAVE A DRINK CONTAINING ALCOHOL: NEVER

## 2024-09-29 ASSESSMENT — PATIENT HEALTH QUESTIONNAIRE - PHQ9
1. LITTLE INTEREST OR PLEASURE IN DOING THINGS: NOT AT ALL
SUM OF ALL RESPONSES TO PHQ QUESTIONS 1-9: 0
SUM OF ALL RESPONSES TO PHQ9 QUESTIONS 1 & 2: 0
SUM OF ALL RESPONSES TO PHQ QUESTIONS 1-9: 0

## 2024-10-01 NOTE — PATIENT INSTRUCTIONS
recommendations, it's better to be more active than less active. All activity done in each category counts toward your weekly total. You'd be surprised how daily things like carrying groceries, keeping up with grandchildren, and taking the stairs can add up.  What keeps you from being active?  If you've had a hard time being more active, you're not alone. Maybe you remember being able to do more. Or maybe you've never thought of yourself as being active. It's frustrating when you can't do the things you want. Being more active can help. What's holding you back?  Getting started.  Have a goal, but break it into easy tasks. Small steps build into big accomplishments.  Staying motivated.  If you feel like skipping your activity, remember your goal. Maybe you want to move better and stay independent. Every activity gets you one step closer.  Not feeling your best.  Start with 5 minutes of an activity you enjoy. Prove to yourself you can do it. As you get comfortable, increase your time.  You may not be where you want to be. But you're in the process of getting there. Everyone starts somewhere.  How can you find safe ways to stay active?  Talk with your doctor about any physical challenges you're facing. Make a plan with your doctor if you have a health problem or aren't sure how to get started with activity.  If you're already active, ask your doctor if there is anything you should change to stay safe as your body and health change.  If you tend to feel dizzy after you take medicine, avoid activity at that time. Try being active before you take your medicine. This will reduce your risk of falls.  If you plan to be active at home, make sure to clear your space before you get started. Remove things like TV cords, coffee tables, and throw rugs. It's safest to have plenty of space to move freely.  The key to getting more active is to take it slow and steady. Try to improve only a little bit at a time. Pick just one area to

## 2024-10-02 ENCOUNTER — OFFICE VISIT (OUTPATIENT)
Dept: INTERNAL MEDICINE | Age: 69
End: 2024-10-02
Payer: MEDICARE

## 2024-10-02 VITALS
RESPIRATION RATE: 18 BRPM | BODY MASS INDEX: 32.32 KG/M2 | DIASTOLIC BLOOD PRESSURE: 66 MMHG | OXYGEN SATURATION: 98 % | HEIGHT: 65 IN | WEIGHT: 194 LBS | TEMPERATURE: 97.6 F | HEART RATE: 53 BPM | SYSTOLIC BLOOD PRESSURE: 143 MMHG

## 2024-10-02 DIAGNOSIS — K21.9 GASTROESOPHAGEAL REFLUX DISEASE WITHOUT ESOPHAGITIS: Chronic | ICD-10-CM

## 2024-10-02 DIAGNOSIS — E04.1 THYROID NODULE: ICD-10-CM

## 2024-10-02 DIAGNOSIS — Z11.59 NEED FOR HEPATITIS C SCREENING TEST: ICD-10-CM

## 2024-10-02 DIAGNOSIS — E78.5 HYPERLIPIDEMIA, UNSPECIFIED HYPERLIPIDEMIA TYPE: ICD-10-CM

## 2024-10-02 DIAGNOSIS — G35 MULTIPLE SCLEROSIS (HCC): ICD-10-CM

## 2024-10-02 DIAGNOSIS — I48.91 ATRIAL FIBRILLATION WITH RVR (HCC): ICD-10-CM

## 2024-10-02 DIAGNOSIS — I13.10 CARDIORENAL SYNDROME WITH RENAL FAILURE, STAGE 1-4 OR UNSPECIFIED CHRONIC KIDNEY DISEASE, WITHOUT HEART FAILURE: ICD-10-CM

## 2024-10-02 DIAGNOSIS — J43.2 CENTRILOBULAR EMPHYSEMA (HCC): ICD-10-CM

## 2024-10-02 DIAGNOSIS — E04.1 NONTOXIC UNINODULAR GOITER: ICD-10-CM

## 2024-10-02 DIAGNOSIS — R20.9 COLD EXTREMITIES: ICD-10-CM

## 2024-10-02 DIAGNOSIS — I13.10 BENIGN HYPERTENSIVE HEART AND KIDNEY DISEASE WITH CHRONIC KIDNEY DISEASE, STAGE 1 THROUGH STAGE 4 OR UNSPECIFIED CHRONIC KIDNEY DISEASE, WITHOUT HEART FAILURE: ICD-10-CM

## 2024-10-02 DIAGNOSIS — R09.89 OTHER SPECIFIED SYMPTOMS AND SIGNS INVOLVING THE CIRCULATORY AND RESPIRATORY SYSTEMS: ICD-10-CM

## 2024-10-02 DIAGNOSIS — N18.31 STAGE 3A CHRONIC KIDNEY DISEASE (HCC): ICD-10-CM

## 2024-10-02 DIAGNOSIS — R91.8 LUNG NODULES: ICD-10-CM

## 2024-10-02 DIAGNOSIS — I10 PRIMARY HYPERTENSION: ICD-10-CM

## 2024-10-02 DIAGNOSIS — Z00.00 MEDICARE ANNUAL WELLNESS VISIT, SUBSEQUENT: Primary | ICD-10-CM

## 2024-10-02 DIAGNOSIS — N18.30 STAGE 3 CHRONIC KIDNEY DISEASE, UNSPECIFIED WHETHER STAGE 3A OR 3B CKD (HCC): ICD-10-CM

## 2024-10-02 DIAGNOSIS — D50.9 IRON DEFICIENCY ANEMIA, UNSPECIFIED IRON DEFICIENCY ANEMIA TYPE: ICD-10-CM

## 2024-10-02 LAB
ALBUMIN: 4.2 G/DL (ref 3.5–5.2)
ALP BLD-CCNC: 95 U/L (ref 35–104)
ALT SERPL-CCNC: 15 U/L (ref 0–32)
ANION GAP SERPL CALCULATED.3IONS-SCNC: 12 MMOL/L (ref 7–16)
AST SERPL-CCNC: 19 U/L (ref 0–31)
BASOPHILS ABSOLUTE: 0.08 K/UL (ref 0–0.2)
BASOPHILS RELATIVE PERCENT: 1 % (ref 0–2)
BILIRUB SERPL-MCNC: 0.4 MG/DL (ref 0–1.2)
BUN BLDV-MCNC: 31 MG/DL (ref 6–23)
CALCIUM SERPL-MCNC: 9.2 MG/DL (ref 8.6–10.2)
CHLORIDE BLD-SCNC: 103 MMOL/L (ref 98–107)
CO2: 27 MMOL/L (ref 22–29)
CREAT SERPL-MCNC: 1.5 MG/DL (ref 0.5–1)
EOSINOPHILS ABSOLUTE: 0.34 K/UL (ref 0.05–0.5)
EOSINOPHILS RELATIVE PERCENT: 4 % (ref 0–6)
GFR, ESTIMATED: 36 ML/MIN/1.73M2
GLUCOSE BLD-MCNC: 89 MG/DL (ref 74–99)
HCT VFR BLD CALC: 38.3 % (ref 34–48)
HEMOGLOBIN: 11.8 G/DL (ref 11.5–15.5)
IMMATURE GRANULOCYTES %: 1 % (ref 0–5)
IMMATURE GRANULOCYTES ABSOLUTE: 0.04 K/UL (ref 0–0.58)
LYMPHOCYTES ABSOLUTE: 1.47 K/UL (ref 1.5–4)
LYMPHOCYTES RELATIVE PERCENT: 17 % (ref 20–42)
MAGNESIUM: 2.3 MG/DL (ref 1.6–2.6)
MCH RBC QN AUTO: 29.7 PG (ref 26–35)
MCHC RBC AUTO-ENTMCNC: 30.8 G/DL (ref 32–34.5)
MCV RBC AUTO: 96.5 FL (ref 80–99.9)
MONOCYTES ABSOLUTE: 0.99 K/UL (ref 0.1–0.95)
MONOCYTES RELATIVE PERCENT: 11 % (ref 2–12)
NEUTROPHILS ABSOLUTE: 5.76 K/UL (ref 1.8–7.3)
NEUTROPHILS RELATIVE PERCENT: 66 % (ref 43–80)
PDW BLD-RTO: 14.6 % (ref 11.5–15)
PHOSPHORUS: 3.6 MG/DL (ref 2.5–4.5)
PLATELET # BLD: 280 K/UL (ref 130–450)
PMV BLD AUTO: 10.8 FL (ref 7–12)
POTASSIUM SERPL-SCNC: 4.2 MMOL/L (ref 3.5–5)
PTH INTACT: 130.4 PG/ML (ref 15–65)
RBC # BLD: 3.97 M/UL (ref 3.5–5.5)
SODIUM BLD-SCNC: 142 MMOL/L (ref 132–146)
TOTAL PROTEIN: 7.7 G/DL (ref 6.4–8.3)
TSH SERPL DL<=0.05 MIU/L-ACNC: 0.49 UIU/ML (ref 0.27–4.2)
WBC # BLD: 8.7 K/UL (ref 4.5–11.5)

## 2024-10-02 PROCEDURE — 36415 COLL VENOUS BLD VENIPUNCTURE: CPT | Performed by: INTERNAL MEDICINE

## 2024-10-02 PROCEDURE — 99214 OFFICE O/P EST MOD 30 MIN: CPT | Performed by: INTERNAL MEDICINE

## 2024-10-02 RX ORDER — LOSARTAN POTASSIUM 50 MG/1
50 TABLET ORAL DAILY
COMMUNITY
End: 2024-10-02 | Stop reason: SDUPTHER

## 2024-10-02 RX ORDER — TORSEMIDE 20 MG/1
10 TABLET ORAL DAILY
Qty: 45 TABLET | Refills: 2 | Status: SHIPPED | OUTPATIENT
Start: 2024-10-02

## 2024-10-02 RX ORDER — CETIRIZINE HYDROCHLORIDE 10 MG/1
10 TABLET ORAL DAILY
Qty: 90 TABLET | Refills: 0 | Status: SHIPPED | OUTPATIENT
Start: 2024-10-02

## 2024-10-02 RX ORDER — ATORVASTATIN CALCIUM 10 MG/1
10 TABLET, FILM COATED ORAL DAILY
Qty: 90 TABLET | Refills: 2 | Status: SHIPPED | OUTPATIENT
Start: 2024-10-02

## 2024-10-02 RX ORDER — CALCITRIOL 0.25 UG/1
0.25 CAPSULE, LIQUID FILLED ORAL
COMMUNITY

## 2024-10-02 RX ORDER — LOSARTAN POTASSIUM 50 MG/1
50 TABLET ORAL DAILY
Qty: 90 TABLET | Refills: 2 | Status: SHIPPED | OUTPATIENT
Start: 2024-10-02

## 2024-10-02 RX ORDER — AMIODARONE HYDROCHLORIDE 200 MG/1
200 TABLET ORAL DAILY
Qty: 90 TABLET | Refills: 2 | Status: SHIPPED | OUTPATIENT
Start: 2024-10-02

## 2024-10-02 RX ORDER — METOPROLOL SUCCINATE 25 MG/1
TABLET, EXTENDED RELEASE ORAL
Qty: 135 TABLET | Refills: 1
Start: 2024-10-02

## 2024-10-02 NOTE — PROGRESS NOTES
[facility] program in [place] for Farxiga and Xaralto to be faxed to [phone]. Last few office visits    TSH to be faxed to Dr. Niles De La Cruz    CBCD, CMP, Mag, Phos, and PTH to be faxed to [facility]    Five tubes of blood drawn as follows:    Two gold, two green and one lavender  Drawn from [facility] at 1025, using #23 butterfly by Esequiel Blount LPN  Sent to lab via tube system at 1041  See media.  Ruth Ann Miranda LPN    
rehab.       Abnormal BMI (obese):  Body mass index is 32.28 kg/m². (!) Abnormal  Interventions:  Above                 Objective   Vitals:    10/02/24 0836   BP: (!) 143/66   Site: Left Upper Arm   Position: Sitting   Cuff Size: Large Adult   Pulse: 53   Resp: 18   Temp: 97.6 °F (36.4 °C)   TempSrc: Temporal   SpO2: 98%   Weight: 88 kg (194 lb)   Height: 1.651 m (5' 5\")      Body mass index is 32.28 kg/m².        HEENT:  PERRL, EOMI, Mouth without erythema or exudate. TMs clear B.    Lungs:  CTA B, no vertebral column tenderness to palpation, no flank tenderness to percussion of flanks B.   Neck:   No carotid bruits appreciated B. No LAD appreciated.   CVS:  +s1/s2 without m/g/r appreciated.    Abd:  + BS, NTND, No renal or aortic bruits, No hepatosplenomegaly appreciated.    Extr:  2+ DP/PT pulses B, no pitting edema  Neurological exam reveals alert, oriented, normal speech, no focal findings or movement disorder noted, cranial nerves II through XII intact, DTR's normal and symmetric, normal muscle tone, no tremors, strength 5/5, normal gait and station.  No pronator drift.  No clonus.     L knee with livedo pattern           Allergies   Allergen Reactions    Seasonal      Spring and fall     Prior to Visit Medications    Medication Sig Taking? Authorizing Provider   calcitRIOL (ROCALTROL) 0.25 MCG capsule Take 1 capsule by mouth three times a week Monday Wednesday and Friday Yes ProviderSurekha MD   torsemide (DEMADEX) 20 MG tablet Take 0.5 tablets by mouth daily Yes Any Youngblood MD   amiodarone (CORDARONE) 200 MG tablet Take 1 tablet by mouth daily Yes Any Youngblood MD   losartan (COZAAR) 50 MG tablet Take 1 tablet by mouth daily Yes Any Youngblood MD   atorvastatin (LIPITOR) 10 MG tablet Take 1 tablet by mouth daily Yes Any Youngblood MD   metoprolol succinate (TOPROL XL) 25 MG extended release tablet Take 1.5 tablets by mouth every morning AND 1 tablet at bedtime. 1.5 BID. Yes Any Youngblood

## 2024-10-03 LAB — HEPATITIS C ANTIBODY: NONREACTIVE

## 2024-10-23 ENCOUNTER — HOSPITAL ENCOUNTER (OUTPATIENT)
Dept: INTERVENTIONAL RADIOLOGY/VASCULAR | Age: 69
Discharge: HOME | End: 2024-10-25
Attending: INTERNAL MEDICINE
Payer: MEDICARE

## 2024-10-23 DIAGNOSIS — R20.9 COLD EXTREMITIES: ICD-10-CM

## 2024-10-23 DIAGNOSIS — R09.89 OTHER SPECIFIED SYMPTOMS AND SIGNS INVOLVING THE CIRCULATORY AND RESPIRATORY SYSTEMS: ICD-10-CM

## 2024-10-23 PROCEDURE — 93922 UPR/L XTREMITY ART 2 LEVELS: CPT

## 2024-11-11 ENCOUNTER — OFFICE VISIT (OUTPATIENT)
Dept: INTERNAL MEDICINE | Age: 69
End: 2024-11-11

## 2024-11-11 VITALS
TEMPERATURE: 97.1 F | OXYGEN SATURATION: 99 % | DIASTOLIC BLOOD PRESSURE: 62 MMHG | HEART RATE: 56 BPM | SYSTOLIC BLOOD PRESSURE: 162 MMHG | RESPIRATION RATE: 18 BRPM

## 2024-11-11 DIAGNOSIS — K21.9 GASTROESOPHAGEAL REFLUX DISEASE, UNSPECIFIED WHETHER ESOPHAGITIS PRESENT: Chronic | ICD-10-CM

## 2024-11-11 DIAGNOSIS — I10 PRIMARY HYPERTENSION: Primary | ICD-10-CM

## 2024-11-11 DIAGNOSIS — G35 MULTIPLE SCLEROSIS (HCC): ICD-10-CM

## 2024-11-11 DIAGNOSIS — N18.31 STAGE 3A CHRONIC KIDNEY DISEASE (HCC): ICD-10-CM

## 2024-11-11 DIAGNOSIS — G57.93 NEUROPATHY INVOLVING BOTH LOWER EXTREMITIES: ICD-10-CM

## 2024-11-11 DIAGNOSIS — E78.2 MIXED HYPERLIPIDEMIA: ICD-10-CM

## 2024-11-11 RX ORDER — GABAPENTIN 100 MG/1
100 CAPSULE ORAL 2 TIMES DAILY
Qty: 180 CAPSULE | Refills: 1 | Status: SHIPPED | OUTPATIENT
Start: 2024-11-11 | End: 2025-05-10

## 2024-11-11 RX ORDER — CLONIDINE HYDROCHLORIDE 0.1 MG/1
0.1 TABLET ORAL ONCE
Status: COMPLETED | OUTPATIENT
Start: 2024-11-11 | End: 2024-11-11

## 2024-11-11 RX ADMIN — CLONIDINE HYDROCHLORIDE 0.1 MG: 0.1 TABLET ORAL at 13:42

## 2024-11-11 NOTE — PROGRESS NOTES
Dr. Youngblood was notified of B/P done manually 162/62 and pt stating H/A is much better and rates it no. 3-4

## 2024-11-11 NOTE — PROGRESS NOTES
[facility]      SUBJECTIVE:  Malinda Stewart (:  1955) is a 69 y.o. female here for evaluation of the following chief complaint(s):  Follow-up  Elevated BP.    Discuss with renal any changes to improve BP. ? Addition of amlodipine.  BPs are still variable with BP from  iw 100s - 160s and Malinda feels poorly with the low BPs.    Send message to Nephrology for suggestions.   Will also discuss DEXA results as increased Vitamin D and calcium is needed.                 Pulmonary HTN - saw pulmonary at [facility] on 2024. PFTs next year if still on amiodarone. She is to follow with pulmonary in 1 year. Notices change with some irritants.                  Pulmonary nodules - will get CT lung in 1 year to follow these up. 2025 is the next scan with pulmonary appointment the following day.    Await follow-up scans.     SVT - Metoprolol 37.5 mg in the morning and 25 mg in the evening.  On farxiga, 10 mg , torsemide, 10 mg every day   Continue same.               Atrial fibrillation - on rivaroxaban and metoprolol. Continues on amiodarone, 200 mg daily. A Next cardio appointment is on 2024.has had 3 episodes of palpitiations that lasted 5 minutes to a half hour.  Gets some chest heaviness.  + SOB with these.     Continue to monitor for further worsening episodes.     Allergic rhinitis - on Allegra and fluticasone nasal spray. Doing OK on these medictions   Continue same.            L arm pain - saw Dr. Talbert on 2024 and received a L subacromial/subdeltoid bursa injection. This is starting to bother Malinda again.  Malinda considering a repeat injections.    Contact Dr. Talbert for follow-up appointment.      Anemia -. On iron, 325 mg daily.  Saw [facility] and Hb was 11.2. Due back in March.    Management as per [facility].                 ALEKSANDR - had a SNAP sleep study. Need to obtain this result from previous PCP.  We have been unable to obtain

## 2024-11-16 PROBLEM — N17.9 ACUTE RENAL FAILURE (ARF) (HCC): Status: RESOLVED | Noted: 2021-02-05 | Resolved: 2024-11-16

## 2024-11-16 PROBLEM — N17.9 AKI (ACUTE KIDNEY INJURY) (HCC): Status: RESOLVED | Noted: 2024-02-21 | Resolved: 2024-11-16

## 2024-11-16 PROBLEM — I48.20 CHRONIC ATRIAL FIBRILLATION (HCC): Status: ACTIVE | Noted: 2021-02-03

## 2024-11-20 ENCOUNTER — TELEPHONE (OUTPATIENT)
Dept: INTERNAL MEDICINE | Age: 69
End: 2024-11-20

## 2024-11-20 NOTE — TELEPHONE ENCOUNTER
Pt thinks she has a UTI pt has burning irritation frequent urination and left side lower back pain . Pt is also on antibiotics due to dental issue. Please advise and contact

## 2024-11-26 ENCOUNTER — TELEPHONE (OUTPATIENT)
Dept: INTERNAL MEDICINE | Age: 69
End: 2024-11-26

## 2024-11-26 DIAGNOSIS — G47.33 OSA (OBSTRUCTIVE SLEEP APNEA): Primary | ICD-10-CM

## 2024-11-26 RX ORDER — FLUCONAZOLE 150 MG/1
150 TABLET ORAL ONCE
Qty: 1 TABLET | Refills: 0 | Status: SHIPPED | OUTPATIENT
Start: 2024-11-26 | End: 2024-11-26

## 2024-11-26 NOTE — TELEPHONE ENCOUNTER
Will treat yeast infection with diflucan, 150 mg for one dose.     Sleep study obtained from [facility]. This was completed on 1/30/2024 and showed severe ALEKSANDR.  Will need to order auto-CPAP.  I called Malinda who is aware of this plan.     Any Youngblood MD  11/26/2024

## 2024-12-19 ENCOUNTER — HOSPITAL ENCOUNTER (OUTPATIENT)
Dept: NEUROLOGY | Age: 69
Discharge: HOME | End: 2024-12-19
Payer: MEDICARE

## 2024-12-19 VITALS — BODY MASS INDEX: 32.32 KG/M2 | WEIGHT: 194 LBS | HEIGHT: 65 IN

## 2024-12-19 DIAGNOSIS — G57.93 NEUROPATHY INVOLVING BOTH LOWER EXTREMITIES: ICD-10-CM

## 2024-12-19 PROCEDURE — 95912 NRV CNDJ TEST 11-12 STUDIES: CPT

## 2024-12-19 PROCEDURE — 95886 MUSC TEST DONE W/N TEST COMP: CPT

## 2024-12-19 NOTE — PROCEDURES
cannot be evaluated by electrodiagnostic measures.    Clinically, the patient noted marked numbness and tingling in both legs, with occasional back pains.  She suffered from multiple comorbidities, including stage IIIb kidney disease, but without diabetes mellitus.    On brief neurological examination, I found moderate edema of the ankles and feet with decreased sensation in both feet.  There were was no motor compromise.    Her difficulties are likely related to this radicular disease, as well as an underlying neuropathy from her longstanding renal insufficiency.  MRIs of her lumbosacral spine were recommended.    Clinical correlation was highly advised.

## 2025-01-07 ENCOUNTER — HOSPITAL ENCOUNTER (OUTPATIENT)
Dept: CARDIAC REHAB | Age: 70
Discharge: HOME | End: 2025-01-07

## 2025-01-07 VITALS — OXYGEN SATURATION: 100 % | BODY MASS INDEX: 33.32 KG/M2 | WEIGHT: 200 LBS | RESPIRATION RATE: 20 BRPM | HEIGHT: 65 IN

## 2025-01-07 ASSESSMENT — PATIENT HEALTH QUESTIONNAIRE - PHQ9
6. FEELING BAD ABOUT YOURSELF - OR THAT YOU ARE A FAILURE OR HAVE LET YOURSELF OR YOUR FAMILY DOWN: NOT AT ALL
10. IF YOU CHECKED OFF ANY PROBLEMS, HOW DIFFICULT HAVE THESE PROBLEMS MADE IT FOR YOU TO DO YOUR WORK, TAKE CARE OF THINGS AT HOME, OR GET ALONG WITH OTHER PEOPLE: NOT DIFFICULT AT ALL
1. LITTLE INTEREST OR PLEASURE IN DOING THINGS: NOT AT ALL
SUM OF ALL RESPONSES TO PHQ9 QUESTIONS 1 & 2: 0
3. TROUBLE FALLING OR STAYING ASLEEP: NOT AT ALL
SUM OF ALL RESPONSES TO PHQ QUESTIONS 1-9: 0
SUM OF ALL RESPONSES TO PHQ QUESTIONS 1-9: 0
5. POOR APPETITE OR OVEREATING: NOT AT ALL
2. FEELING DOWN, DEPRESSED OR HOPELESS: NOT AT ALL
SUM OF ALL RESPONSES TO PHQ QUESTIONS 1-9: 0
4. FEELING TIRED OR HAVING LITTLE ENERGY: NOT AT ALL
SUM OF ALL RESPONSES TO PHQ QUESTIONS 1-9: 0
8. MOVING OR SPEAKING SO SLOWLY THAT OTHER PEOPLE COULD HAVE NOTICED. OR THE OPPOSITE, BEING SO FIGETY OR RESTLESS THAT YOU HAVE BEEN MOVING AROUND A LOT MORE THAN USUAL: NOT AT ALL
7. TROUBLE CONCENTRATING ON THINGS, SUCH AS READING THE NEWSPAPER OR WATCHING TELEVISION: NOT AT ALL
9. THOUGHTS THAT YOU WOULD BE BETTER OFF DEAD, OR OF HURTING YOURSELF: NOT AT ALL

## 2025-01-07 ASSESSMENT — EJECTION FRACTION: EF_VALUE: 60

## 2025-01-07 NOTE — CARDIO/PULMONARY
PT. SEEN IN CARDIAC REHAB FOR INITIAL EVALUATION AND EXERCISE. PT IS A PHASE 3. PT. S/P AFIB AND CHF. PT HAS HAD AFIB FOR 2 YEARS. SHE HAS A HX OF ABLATIONS ANS CARDIOVERSION. SHE IS NOW IN SINUS BRADYCARDIA.  PT. HAS AN ELECTION FRACTION OF 60% FROM AN ECHO DONE IN FEBRUARY OF 2024. PT. HAS A HX OF MULTIPLE SCLEROSIS, EMPHYSEMA, KIDNEY PROBLEMS, THYROID NODULE, NEUROPATHY IN HER FEET, LEFT SHOULDER TORN ROTATOR CUFF, AND BILATERAL KNEE REPLACEMENTS., AND PULMONARY HYPERTENSION.  PT. DOES HAVE SOME SWELLING IN HER ANKLES AND IS ON A DAILY DIURETIC. PHQ9 SCORE IS A 0. DENIES ANY HX OF DEPRESSION. FALLS RISK IS 3/8. SHE IS A HIGH FALLS RISK DUE TO PREVIOUS FALLS AND BALANCE PROBLEMS. SHE DOES EXERCISE AT HOME WITH RESISTANCE BANDS PER NEUROLOGY. PT.'S GOAL IS TO IMPROVE HER STRENGTH NAD STAMINA THROUGH EXERCISE AT CARDIAC REHAB.

## 2025-01-10 LAB
25(OH)D3 SERPL-MCNC: 29 NG/ML (ref 30–100)
ALBUMIN SERPL-MCNC: 4.1 G/DL (ref 3.5–5.2)
ALP SERPL-CCNC: 96 U/L (ref 35–104)
ALT SERPL-CCNC: 15 U/L (ref 0–32)
ANION GAP SERPL CALCULATED.3IONS-SCNC: 13 MMOL/L (ref 7–16)
AST SERPL-CCNC: 17 U/L (ref 0–31)
BASOPHILS # BLD: 0.06 K/UL (ref 0–0.2)
BASOPHILS NFR BLD: 1 % (ref 0–2)
BILIRUB SERPL-MCNC: 0.4 MG/DL (ref 0–1.2)
BILIRUB UR QL STRIP: NEGATIVE
BUN SERPL-MCNC: 36 MG/DL (ref 6–23)
CALCIUM SERPL-MCNC: 9.6 MG/DL (ref 8.6–10.2)
CHLORIDE SERPL-SCNC: 103 MMOL/L (ref 98–107)
CHOLEST SERPL-MCNC: 171 MG/DL
CLARITY UR: CLEAR
CO2 SERPL-SCNC: 26 MMOL/L (ref 22–29)
COLOR UR: YELLOW
CREAT SERPL-MCNC: 1.7 MG/DL (ref 0.5–1)
EOSINOPHIL # BLD: 0.25 K/UL (ref 0.05–0.5)
EOSINOPHILS RELATIVE PERCENT: 3 % (ref 0–6)
ERYTHROCYTE [DISTWIDTH] IN BLOOD BY AUTOMATED COUNT: 14.6 % (ref 11.5–15)
GFR, ESTIMATED: 32 ML/MIN/1.73M2
GLUCOSE SERPL-MCNC: 108 MG/DL (ref 74–99)
GLUCOSE UR STRIP-MCNC: NEGATIVE MG/DL
HCT VFR BLD AUTO: 38.3 % (ref 34–48)
HDLC SERPL-MCNC: 66 MG/DL
HGB BLD-MCNC: 11.9 G/DL (ref 11.5–15.5)
HGB UR QL STRIP.AUTO: NEGATIVE
IMM GRANULOCYTES # BLD AUTO: 0.04 K/UL (ref 0–0.58)
IMM GRANULOCYTES NFR BLD: 1 % (ref 0–5)
KETONES UR STRIP-MCNC: NEGATIVE MG/DL
LDLC SERPL CALC-MCNC: 95 MG/DL
LEUKOCYTE ESTERASE UR QL STRIP: NEGATIVE
LYMPHOCYTES NFR BLD: 1.21 K/UL (ref 1.5–4)
LYMPHOCYTES RELATIVE PERCENT: 15 % (ref 20–42)
MAGNESIUM SERPL-MCNC: 2.1 MG/DL (ref 1.6–2.6)
MCH RBC QN AUTO: 29.5 PG (ref 26–35)
MCHC RBC AUTO-ENTMCNC: 31.1 G/DL (ref 32–34.5)
MCV RBC AUTO: 94.8 FL (ref 80–99.9)
MONOCYTES NFR BLD: 0.7 K/UL (ref 0.1–0.95)
MONOCYTES NFR BLD: 9 % (ref 2–12)
NEUTROPHILS NFR BLD: 73 % (ref 43–80)
NEUTS SEG NFR BLD: 5.99 K/UL (ref 1.8–7.3)
NITRITE UR QL STRIP: NEGATIVE
PH UR STRIP: 6 [PH] (ref 5–9)
PHOSPHATE SERPL-MCNC: 4.3 MG/DL (ref 2.5–4.5)
PLATELET # BLD AUTO: 271 K/UL (ref 130–450)
PMV BLD AUTO: 11.1 FL (ref 7–12)
POTASSIUM SERPL-SCNC: 5.5 MMOL/L (ref 3.5–5)
PROT SERPL-MCNC: 7.4 G/DL (ref 6.4–8.3)
PROT UR STRIP-MCNC: NEGATIVE MG/DL
PTH-INTACT SERPL-MCNC: 145.3 PG/ML (ref 15–65)
RBC # BLD AUTO: 4.04 M/UL (ref 3.5–5.5)
RBC #/AREA URNS HPF: NORMAL /HPF
SODIUM SERPL-SCNC: 142 MMOL/L (ref 132–146)
SP GR UR STRIP: 1.02 (ref 1–1.03)
TRIGL SERPL-MCNC: 51 MG/DL
URATE SERPL-MCNC: 6.3 MG/DL (ref 2.4–5.7)
UROBILINOGEN UR STRIP-ACNC: 0.2 EU/DL (ref 0–1)
VLDLC SERPL CALC-MCNC: 10 MG/DL
WBC #/AREA URNS HPF: NORMAL /HPF
WBC OTHER # BLD: 8.3 K/UL (ref 4.5–11.5)

## 2025-01-11 LAB
CREAT UR-MCNC: 84.5 MG/DL (ref 29–226)
MICROALBUMIN UR-MCNC: <12 MG/L (ref 0–19)
MICROALBUMIN/CREAT UR-RTO: NORMAL MCG/MG CREAT (ref 0–30)

## 2025-01-13 SDOH — ECONOMIC STABILITY: FOOD INSECURITY: WITHIN THE PAST 12 MONTHS, THE FOOD YOU BOUGHT JUST DIDN'T LAST AND YOU DIDN'T HAVE MONEY TO GET MORE.: NEVER TRUE

## 2025-01-13 SDOH — ECONOMIC STABILITY: TRANSPORTATION INSECURITY
IN THE PAST 12 MONTHS, HAS THE LACK OF TRANSPORTATION KEPT YOU FROM MEDICAL APPOINTMENTS OR FROM GETTING MEDICATIONS?: NO

## 2025-01-13 SDOH — ECONOMIC STABILITY: FOOD INSECURITY: WITHIN THE PAST 12 MONTHS, YOU WORRIED THAT YOUR FOOD WOULD RUN OUT BEFORE YOU GOT MONEY TO BUY MORE.: NEVER TRUE

## 2025-01-13 SDOH — ECONOMIC STABILITY: INCOME INSECURITY: IN THE LAST 12 MONTHS, WAS THERE A TIME WHEN YOU WERE NOT ABLE TO PAY THE MORTGAGE OR RENT ON TIME?: NO

## 2025-01-13 SDOH — ECONOMIC STABILITY: TRANSPORTATION INSECURITY
IN THE PAST 12 MONTHS, HAS LACK OF TRANSPORTATION KEPT YOU FROM MEETINGS, WORK, OR FROM GETTING THINGS NEEDED FOR DAILY LIVING?: NO

## 2025-01-15 ENCOUNTER — OFFICE VISIT (OUTPATIENT)
Dept: INTERNAL MEDICINE | Age: 70
End: 2025-01-15
Payer: MEDICARE

## 2025-01-15 VITALS
OXYGEN SATURATION: 99 % | SYSTOLIC BLOOD PRESSURE: 138 MMHG | RESPIRATION RATE: 18 BRPM | DIASTOLIC BLOOD PRESSURE: 84 MMHG | HEART RATE: 50 BPM | HEIGHT: 65 IN | WEIGHT: 203 LBS | TEMPERATURE: 97.6 F | BODY MASS INDEX: 33.82 KG/M2

## 2025-01-15 DIAGNOSIS — G62.9 PERIPHERAL POLYNEUROPATHY: ICD-10-CM

## 2025-01-15 DIAGNOSIS — E87.5 HYPERKALEMIA: ICD-10-CM

## 2025-01-15 DIAGNOSIS — G35 MULTIPLE SCLEROSIS (HCC): ICD-10-CM

## 2025-01-15 DIAGNOSIS — N18.32 STAGE 3B CHRONIC KIDNEY DISEASE (HCC): ICD-10-CM

## 2025-01-15 DIAGNOSIS — N18.31 STAGE 3A CHRONIC KIDNEY DISEASE (HCC): ICD-10-CM

## 2025-01-15 DIAGNOSIS — R30.0 DYSURIA: ICD-10-CM

## 2025-01-15 DIAGNOSIS — I48.20 CHRONIC ATRIAL FIBRILLATION (HCC): ICD-10-CM

## 2025-01-15 DIAGNOSIS — R91.8 LUNG NODULES: ICD-10-CM

## 2025-01-15 DIAGNOSIS — J43.2 CENTRILOBULAR EMPHYSEMA (HCC): ICD-10-CM

## 2025-01-15 DIAGNOSIS — E04.1 THYROID NODULE: ICD-10-CM

## 2025-01-15 DIAGNOSIS — E87.5 HYPERKALEMIA: Primary | ICD-10-CM

## 2025-01-15 DIAGNOSIS — K21.9 GASTROESOPHAGEAL REFLUX DISEASE, UNSPECIFIED WHETHER ESOPHAGITIS PRESENT: Chronic | ICD-10-CM

## 2025-01-15 DIAGNOSIS — I27.20 SEVERE PULMONARY HYPERTENSION (HCC): ICD-10-CM

## 2025-01-15 DIAGNOSIS — I50.22 CHRONIC SYSTOLIC (CONGESTIVE) HEART FAILURE (HCC): ICD-10-CM

## 2025-01-15 PROBLEM — N18.9 CKD (CHRONIC KIDNEY DISEASE): Status: RESOLVED | Noted: 2022-05-12 | Resolved: 2025-01-15

## 2025-01-15 PROBLEM — R94.6 ABNORMAL THYROID HORMONE METABOLISM: Status: RESOLVED | Noted: 2023-08-21 | Resolved: 2025-01-15

## 2025-01-15 PROBLEM — R00.2 PALPITATION: Status: RESOLVED | Noted: 2022-05-13 | Resolved: 2025-01-15

## 2025-01-15 PROBLEM — K08.89 TOOTHACHE: Status: RESOLVED | Noted: 2024-04-07 | Resolved: 2025-01-15

## 2025-01-15 PROBLEM — R11.0 NAUSEA: Status: RESOLVED | Noted: 2020-04-15 | Resolved: 2025-01-15

## 2025-01-15 PROBLEM — R06.00 DYSPNEA: Status: RESOLVED | Noted: 2020-04-15 | Resolved: 2025-01-15

## 2025-01-15 PROBLEM — R51.9 HEADACHE: Status: RESOLVED | Noted: 2024-03-04 | Resolved: 2025-01-15

## 2025-01-15 PROBLEM — R94.31 ABNORMAL EKG: Status: RESOLVED | Noted: 2020-04-14 | Resolved: 2025-01-15

## 2025-01-15 LAB
ANION GAP SERPL CALCULATED.3IONS-SCNC: 9 MMOL/L (ref 7–16)
BILIRUBIN, POC: NEGATIVE
BLOOD URINE, POC: NEGATIVE
BUN BLDV-MCNC: 34 MG/DL (ref 6–23)
CALCIUM SERPL-MCNC: 9.2 MG/DL (ref 8.6–10.2)
CHLORIDE BLD-SCNC: 107 MMOL/L (ref 98–107)
CLARITY, POC: CLEAR
CO2: 26 MMOL/L (ref 22–29)
COLOR, POC: YELLOW
CREAT SERPL-MCNC: 1.9 MG/DL (ref 0.5–1)
GFR, ESTIMATED: 29 ML/MIN/1.73M2
GLUCOSE BLD-MCNC: 107 MG/DL (ref 74–99)
GLUCOSE URINE, POC: 100 MG/DL
KETONES, POC: NEGATIVE MG/DL
LEUKOCYTE EST, POC: NEGATIVE
NITRITE, POC: NEGATIVE
PH, POC: 5.5
POTASSIUM SERPL-SCNC: 4.6 MMOL/L (ref 3.5–5)
PROTEIN, POC: NEGATIVE MG/DL
SODIUM BLD-SCNC: 142 MMOL/L (ref 132–146)
SPECIFIC GRAVITY, POC: 1.01
UROBILINOGEN, POC: 0.2 MG/DL

## 2025-01-15 PROCEDURE — 99214 OFFICE O/P EST MOD 30 MIN: CPT | Performed by: INTERNAL MEDICINE

## 2025-01-15 PROCEDURE — 36415 COLL VENOUS BLD VENIPUNCTURE: CPT | Performed by: INTERNAL MEDICINE

## 2025-01-15 PROCEDURE — 81002 URINALYSIS NONAUTO W/O SCOPE: CPT | Performed by: INTERNAL MEDICINE

## 2025-01-15 RX ORDER — FERROUS SULFATE 325(65) MG
325 TABLET ORAL
Qty: 90 TABLET | Refills: 2 | Status: SHIPPED | OUTPATIENT
Start: 2025-01-15

## 2025-01-15 RX ORDER — AMIODARONE HYDROCHLORIDE 200 MG/1
200 TABLET ORAL DAILY
Qty: 90 TABLET | Refills: 2 | Status: SHIPPED | OUTPATIENT
Start: 2025-01-15

## 2025-01-15 RX ORDER — PANTOPRAZOLE SODIUM 40 MG/1
40 TABLET, DELAYED RELEASE ORAL 2 TIMES DAILY
Qty: 180 TABLET | Refills: 2 | Status: SHIPPED | OUTPATIENT
Start: 2025-01-15

## 2025-01-15 RX ORDER — METOPROLOL SUCCINATE 25 MG/1
25 TABLET, EXTENDED RELEASE ORAL 2 TIMES DAILY
Qty: 180 TABLET | Refills: 2 | Status: SHIPPED | OUTPATIENT
Start: 2025-01-15

## 2025-01-15 RX ORDER — METOPROLOL SUCCINATE 25 MG/1
25 TABLET, EXTENDED RELEASE ORAL 2 TIMES DAILY
COMMUNITY
End: 2025-01-15 | Stop reason: SDUPTHER

## 2025-01-15 RX ORDER — AMLODIPINE BESYLATE 5 MG/1
5 TABLET ORAL DAILY
Qty: 90 TABLET | Refills: 2 | Status: SHIPPED | OUTPATIENT
Start: 2025-01-15

## 2025-01-15 RX ORDER — ATORVASTATIN CALCIUM 10 MG/1
10 TABLET, FILM COATED ORAL DAILY
Qty: 90 TABLET | Refills: 2 | Status: SHIPPED | OUTPATIENT
Start: 2025-01-15

## 2025-01-15 RX ORDER — CALCITRIOL 0.25 UG/1
0.25 CAPSULE, LIQUID FILLED ORAL
Qty: 90 CAPSULE | Refills: 2 | Status: SHIPPED | OUTPATIENT
Start: 2025-01-15

## 2025-01-15 RX ORDER — CETIRIZINE HYDROCHLORIDE 10 MG/1
10 TABLET ORAL DAILY
Qty: 90 TABLET | Refills: 2 | Status: SHIPPED | OUTPATIENT
Start: 2025-01-15

## 2025-01-15 RX ORDER — LOSARTAN POTASSIUM 50 MG/1
50 TABLET ORAL DAILY
Qty: 90 TABLET | Refills: 2 | Status: SHIPPED | OUTPATIENT
Start: 2025-01-15

## 2025-01-15 RX ORDER — AMLODIPINE BESYLATE 5 MG/1
5 TABLET ORAL DAILY
COMMUNITY
End: 2025-01-15 | Stop reason: SDUPTHER

## 2025-01-15 RX ORDER — ASPIRIN 81 MG/1
81 TABLET, CHEWABLE ORAL DAILY
Qty: 90 TABLET | Refills: 2 | Status: SHIPPED | OUTPATIENT
Start: 2025-01-15

## 2025-01-15 RX ORDER — FLUTICASONE PROPIONATE 50 MCG
2 SPRAY, SUSPENSION (ML) NASAL DAILY
Qty: 52 G | Refills: 2 | Status: SHIPPED | OUTPATIENT
Start: 2025-01-15

## 2025-01-15 RX ORDER — GABAPENTIN 100 MG/1
100 CAPSULE ORAL 2 TIMES DAILY
Qty: 180 CAPSULE | Refills: 2 | Status: SHIPPED | OUTPATIENT
Start: 2025-01-15 | End: 2025-10-12

## 2025-01-15 RX ORDER — BISACODYL 5 MG/1
5 TABLET, DELAYED RELEASE ORAL DAILY PRN
Qty: 90 TABLET | Refills: 2 | Status: SHIPPED | OUTPATIENT
Start: 2025-01-15

## 2025-01-15 RX ORDER — M-VIT,TX,IRON,MINS/CALC/FOLIC 27MG-0.4MG
1 TABLET ORAL DAILY
Qty: 90 TABLET | Refills: 2 | Status: SHIPPED | OUTPATIENT
Start: 2025-01-15

## 2025-01-15 SDOH — ECONOMIC STABILITY: FOOD INSECURITY: WITHIN THE PAST 12 MONTHS, YOU WORRIED THAT YOUR FOOD WOULD RUN OUT BEFORE YOU GOT MONEY TO BUY MORE.: NEVER TRUE

## 2025-01-15 SDOH — ECONOMIC STABILITY: FOOD INSECURITY: WITHIN THE PAST 12 MONTHS, THE FOOD YOU BOUGHT JUST DIDN'T LAST AND YOU DIDN'T HAVE MONEY TO GET MORE.: NEVER TRUE

## 2025-01-15 NOTE — PROGRESS NOTES
The following lab draw was performed today:    1 tubes of blood were drawn as follows:    1 green top tube, tube expiration date 1/1/26    Using  #22 G x 1.5\" (black) straight needle and QuickShield safety tube fisher    From [facility] at 1111.    All tubes inverted 20 times. Tubes left in top-side down position while preparing paperwork. Labels attached and also taped for security. Placed in bag and sent to lab via tube system at 1121. Unable to scan to media to to connection errors with scanner and Epic today. Ruth Ann Miranda LPN      
spray, which provides some relief.    She is scheduled for a follow-up at the [facility] in March 2024 for her anemia. She is currently taking iron supplements.     She received a call from Dr. De Leon's office regarding lab work to be done 2 weeks prior to her appointment in January 2024 but is still awaiting a call to schedule her follow-up appointment.    She has been experiencing burning sensations during urination, which she initially attributed to a urinary tract infection (UTI). However, her urine test results were normal. She does not report any itching and does not believe it is a yeast infection.    She is under the care of Dr. De La Cruz for her thyroid condition, with her next appointment scheduled for August 2024.    She does not report any issues with acid reflux and is currently on pantoprazole, which is effectively managing her symptoms.    She has a follow-up appointment with neurology in [place] in April 2024. She underwent an EMG here for further evaluation of her peripheral neuropathy, which confirmed the presence of neuropathy. She is not diabetic. She has not started gabapentin and is considering taking it at night. She experiences neuropathy symptoms most severely during the day, particularly while driving. She has been advised to undergo an MRI of her back. Dr. Maddox, a neurologist, had previously conducted an MRI of her cervical spine. She has been informed of the presence of a few pinched nerves.    She is currently experiencing mild back pain, for which she occasionally takes Tylenol. She is wearing compression stockings and reports minimal swelling.    Supplemental Information  She reports no changes in her pulmonary hypertension and is scheduled for tests  for her Pulmonary HTN in June 2024. She is also due for a lung scan for pulmonary nodules at that same time..        Review of Systems - as above    Current Outpatient Medications on File Prior to Visit   Medication Sig Dispense Refill

## 2025-01-21 ENCOUNTER — TELEPHONE (OUTPATIENT)
Dept: INTERNAL MEDICINE | Age: 70
End: 2025-01-21

## 2025-01-21 NOTE — TELEPHONE ENCOUNTER
----- Message from Dr. Any Youngblood MD sent at 1/21/2025 11:34 AM EST -----  Please let Malinda know that her K+ has normalized but there was a slight decline in her kidney function.  Please let her know that we will notify nephrology.  Please fax this result to [facility] to Dr. Garcia's attention.      Thank you!    Any Youngblood MD  1/21/2025

## 2025-01-29 ENCOUNTER — HOSPITAL ENCOUNTER (OUTPATIENT)
Dept: MRI IMAGING | Age: 70
Discharge: HOME | End: 2025-01-31
Attending: INTERNAL MEDICINE
Payer: MEDICARE

## 2025-01-29 ENCOUNTER — TELEPHONE (OUTPATIENT)
Dept: INTERNAL MEDICINE | Age: 70
End: 2025-01-29

## 2025-01-29 DIAGNOSIS — G62.9 PERIPHERAL POLYNEUROPATHY: ICD-10-CM

## 2025-01-29 DIAGNOSIS — M48.061 SPINAL STENOSIS OF LUMBAR REGION WITHOUT NEUROGENIC CLAUDICATION: ICD-10-CM

## 2025-01-29 DIAGNOSIS — M51.9 LUMBAR DISC DISEASE: Primary | ICD-10-CM

## 2025-01-29 PROCEDURE — 72148 MRI LUMBAR SPINE W/O DYE: CPT

## 2025-01-29 NOTE — TELEPHONE ENCOUNTER
Discussed MRI results with Malinda.  These findings could be the cause of the neuropathy that Malinda is experiencing.  Malinda is in agreement with a referral to PM&R.      Any Youngblood MD  1/29/2025

## 2025-03-10 ENCOUNTER — OFFICE VISIT (OUTPATIENT)
Dept: PHYSICAL MEDICINE AND REHAB | Age: 70
End: 2025-03-10
Payer: MEDICARE

## 2025-03-10 VITALS
HEIGHT: 65 IN | OXYGEN SATURATION: 100 % | TEMPERATURE: 98.1 F | SYSTOLIC BLOOD PRESSURE: 130 MMHG | DIASTOLIC BLOOD PRESSURE: 78 MMHG | WEIGHT: 203 LBS | BODY MASS INDEX: 33.82 KG/M2

## 2025-03-10 DIAGNOSIS — M54.17 RADICULOPATHY, LUMBOSACRAL REGION: ICD-10-CM

## 2025-03-10 DIAGNOSIS — M51.362 DEGENERATION OF INTERVERTEBRAL DISC OF LUMBAR REGION WITH DISCOGENIC BACK PAIN AND LOWER EXTREMITY PAIN: Primary | ICD-10-CM

## 2025-03-10 DIAGNOSIS — M48.061 NEUROFORAMINAL STENOSIS OF LUMBAR SPINE: ICD-10-CM

## 2025-03-10 DIAGNOSIS — G60.9 IDIOPATHIC PERIPHERAL NEUROPATHY: ICD-10-CM

## 2025-03-10 PROCEDURE — 3078F DIAST BP <80 MM HG: CPT | Performed by: PHYSICAL MEDICINE & REHABILITATION

## 2025-03-10 PROCEDURE — 1123F ACP DISCUSS/DSCN MKR DOCD: CPT | Performed by: PHYSICAL MEDICINE & REHABILITATION

## 2025-03-10 PROCEDURE — 1036F TOBACCO NON-USER: CPT | Performed by: PHYSICAL MEDICINE & REHABILITATION

## 2025-03-10 PROCEDURE — 3075F SYST BP GE 130 - 139MM HG: CPT | Performed by: PHYSICAL MEDICINE & REHABILITATION

## 2025-03-10 PROCEDURE — G8427 DOCREV CUR MEDS BY ELIG CLIN: HCPCS | Performed by: PHYSICAL MEDICINE & REHABILITATION

## 2025-03-10 PROCEDURE — 1090F PRES/ABSN URINE INCON ASSESS: CPT | Performed by: PHYSICAL MEDICINE & REHABILITATION

## 2025-03-10 PROCEDURE — 3017F COLORECTAL CA SCREEN DOC REV: CPT | Performed by: PHYSICAL MEDICINE & REHABILITATION

## 2025-03-10 PROCEDURE — G8399 PT W/DXA RESULTS DOCUMENT: HCPCS | Performed by: PHYSICAL MEDICINE & REHABILITATION

## 2025-03-10 PROCEDURE — 99204 OFFICE O/P NEW MOD 45 MIN: CPT | Performed by: PHYSICAL MEDICINE & REHABILITATION

## 2025-03-10 PROCEDURE — G8417 CALC BMI ABV UP PARAM F/U: HCPCS | Performed by: PHYSICAL MEDICINE & REHABILITATION

## 2025-03-10 RX ORDER — PREGABALIN 25 MG/1
25 CAPSULE ORAL 2 TIMES DAILY
Qty: 60 CAPSULE | Refills: 1
Start: 2025-03-10 | End: 2025-03-12 | Stop reason: SDUPTHER

## 2025-03-10 NOTE — PROGRESS NOTES
Preethi Paredes M.D.  [facility] [place] [facility]  [address]  [address]  [place] [id number]  Dept: [phone]  Dept Fax: [phone]  Loc: [phone]    PCP: Any Youngblood MD  Date of visit: 3/10/25    Chief Complaint   Patient presents with    Back Pain     Patient is here for chronic lumbar pain.  MRI 1/29/25. Patient uses heating pad which relieves pain temporarily. EMG 12/24.   Patient takes gabapentin 100 mg nightly without much relief.       Malinda Stewart is a 70 y.o.   woman with gradual onset of low back pain after no known injury a few years ago. She states the back pain is actually not bothering her all that much, her main complaint is tingling in her feet. She reports tingling and paresthesias that started on the bottoms of both feet about 5 years ago and is now progressing up her legs to mid shin bilaterally in a stocking pattern. Now, the pain is intermittent and occurs 2-3 days out of an average week. Leg paresthesias are constant. The back pain is rated 5/10 in severity and is described as sharp, and is located in the bilateral low back without radiation. She endorses numbness/tingling in the bilateral feet and lower legs up to mid shin in a stocking pattern. No associated weakness. No incontinence reported.  The symptoms have been worse since onset.  The back pain is better with heat, massage. Leg discomforts are better with nothing.  The pain is worse with twisting.    EMG completed December 2024 showed Left L5 and S1 radiculopathy (needle evaluation for right lumbar radiculopathy was not completed). Lumbar radiculopathy is likely contributing to symptoms in feet/legs. 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     Patient follows with [facility] for her

## 2025-03-11 NOTE — PROGRESS NOTES
Take 1 tablet by mouth in the morning and at bedtime 180 tablet 2    Multiple Vitamins-Minerals (THERAPEUTIC MULTIVITAMIN-MINERALS) tablet Take 1 tablet by mouth daily 90 tablet 2    torsemide (DEMADEX) 20 MG tablet Take 0.5 tablets by mouth daily 45 tablet 2    dapagliflozin (FARXIGA) 10 MG tablet Take 1 tablet by mouth every morning      rivaroxaban (XARELTO) 15 MG TABS tablet Take 1 tablet by mouth Daily with supper       No current facility-administered medications on file prior to visit.       OBJECTIVE:  VS:   Vitals:    03/12/25 0806 03/12/25 0845   BP: (!) 158/71 (!) 156/70   BP Site: Left Lower Arm Left Lower Arm   Patient Position: Sitting Sitting   BP Cuff Size: Medium Adult Medium Adult   Pulse: 66    Resp: 18    Temp: 97 °F (36.1 °C)    TempSrc: Temporal    SpO2: 96%    Weight: 93.8 kg (206 lb 12.8 oz)    Height: 1.651 m (5' 5\")    Lungs:  CTA B  Neck:   No carotid bruits appreciated B.   CVS:  +s1/s2 without m/g/r appreciated.    Abd:  + BS, NTND, No renal or aortic bruits   Extr:  2+ DP/PT pulses B, no pitting edema, compression stockings in place.       Any Youngblood MD   3/12/2025 8:58 AM     The patient (or guardian, if applicable) and other individuals in attendance with the patient were advised that Artificial Intelligence will be utilized during this visit to record, process the conversation to generate a clinical note, and support improvement of the AI technology. The patient (or guardian, if applicable) and other individuals in attendance at the appointment consented to the use of AI, including the recording.

## 2025-03-12 ENCOUNTER — OFFICE VISIT (OUTPATIENT)
Dept: INTERNAL MEDICINE | Age: 70
End: 2025-03-12
Payer: MEDICARE

## 2025-03-12 VITALS
DIASTOLIC BLOOD PRESSURE: 70 MMHG | SYSTOLIC BLOOD PRESSURE: 156 MMHG | WEIGHT: 206.8 LBS | TEMPERATURE: 97 F | HEART RATE: 66 BPM | RESPIRATION RATE: 18 BRPM | HEIGHT: 65 IN | OXYGEN SATURATION: 96 % | BODY MASS INDEX: 34.45 KG/M2

## 2025-03-12 DIAGNOSIS — M51.362 DEGENERATION OF INTERVERTEBRAL DISC OF LUMBAR REGION WITH DISCOGENIC BACK PAIN AND LOWER EXTREMITY PAIN: ICD-10-CM

## 2025-03-12 DIAGNOSIS — M54.17 RADICULOPATHY, LUMBOSACRAL REGION: ICD-10-CM

## 2025-03-12 DIAGNOSIS — G60.9 IDIOPATHIC PERIPHERAL NEUROPATHY: ICD-10-CM

## 2025-03-12 DIAGNOSIS — M48.061 NEUROFORAMINAL STENOSIS OF LUMBAR SPINE: ICD-10-CM

## 2025-03-12 PROCEDURE — G8427 DOCREV CUR MEDS BY ELIG CLIN: HCPCS | Performed by: INTERNAL MEDICINE

## 2025-03-12 PROCEDURE — G8399 PT W/DXA RESULTS DOCUMENT: HCPCS | Performed by: INTERNAL MEDICINE

## 2025-03-12 PROCEDURE — 99213 OFFICE O/P EST LOW 20 MIN: CPT | Performed by: INTERNAL MEDICINE

## 2025-03-12 PROCEDURE — 1036F TOBACCO NON-USER: CPT | Performed by: INTERNAL MEDICINE

## 2025-03-12 PROCEDURE — 3078F DIAST BP <80 MM HG: CPT | Performed by: INTERNAL MEDICINE

## 2025-03-12 PROCEDURE — 1123F ACP DISCUSS/DSCN MKR DOCD: CPT | Performed by: INTERNAL MEDICINE

## 2025-03-12 PROCEDURE — G8417 CALC BMI ABV UP PARAM F/U: HCPCS | Performed by: INTERNAL MEDICINE

## 2025-03-12 PROCEDURE — 3077F SYST BP >= 140 MM HG: CPT | Performed by: INTERNAL MEDICINE

## 2025-03-12 PROCEDURE — 3017F COLORECTAL CA SCREEN DOC REV: CPT | Performed by: INTERNAL MEDICINE

## 2025-03-12 PROCEDURE — 1090F PRES/ABSN URINE INCON ASSESS: CPT | Performed by: INTERNAL MEDICINE

## 2025-03-12 PROCEDURE — 1159F MED LIST DOCD IN RCRD: CPT | Performed by: INTERNAL MEDICINE

## 2025-03-12 PROCEDURE — 99214 OFFICE O/P EST MOD 30 MIN: CPT | Performed by: INTERNAL MEDICINE

## 2025-03-12 RX ORDER — PREGABALIN 25 MG/1
25 CAPSULE ORAL 2 TIMES DAILY
Qty: 60 CAPSULE | Refills: 1 | Status: SHIPPED | OUTPATIENT
Start: 2025-03-12 | End: 2025-05-11

## 2025-04-30 ENCOUNTER — OFFICE VISIT (OUTPATIENT)
Dept: ORTHOPEDIC SURGERY | Age: 70
End: 2025-04-30
Payer: MEDICARE

## 2025-04-30 VITALS
DIASTOLIC BLOOD PRESSURE: 80 MMHG | TEMPERATURE: 98.3 F | SYSTOLIC BLOOD PRESSURE: 157 MMHG | RESPIRATION RATE: 18 BRPM | WEIGHT: 206 LBS | BODY MASS INDEX: 34.32 KG/M2 | OXYGEN SATURATION: 98 % | HEART RATE: 64 BPM | HEIGHT: 65 IN

## 2025-04-30 DIAGNOSIS — M75.122 NONTRAUMATIC COMPLETE TEAR OF LEFT ROTATOR CUFF: ICD-10-CM

## 2025-04-30 DIAGNOSIS — G89.29 CHRONIC LEFT SHOULDER PAIN: Primary | ICD-10-CM

## 2025-04-30 DIAGNOSIS — M25.512 CHRONIC LEFT SHOULDER PAIN: Primary | ICD-10-CM

## 2025-04-30 PROCEDURE — 20611 DRAIN/INJ JOINT/BURSA W/US: CPT | Performed by: FAMILY MEDICINE

## 2025-04-30 RX ORDER — BETAMETHASONE SODIUM PHOSPHATE AND BETAMETHASONE ACETATE 3; 3 MG/ML; MG/ML
6 INJECTION, SUSPENSION INTRA-ARTICULAR; INTRALESIONAL; INTRAMUSCULAR; SOFT TISSUE ONCE
Status: COMPLETED | OUTPATIENT
Start: 2025-04-30 | End: 2025-04-30

## 2025-04-30 RX ORDER — LIDOCAINE HYDROCHLORIDE 10 MG/ML
3 INJECTION, SOLUTION INFILTRATION; PERINEURAL ONCE
Status: COMPLETED | OUTPATIENT
Start: 2025-04-30 | End: 2025-04-30

## 2025-04-30 RX ADMIN — LIDOCAINE HYDROCHLORIDE 3 ML: 10 INJECTION, SOLUTION INFILTRATION; PERINEURAL at 16:47

## 2025-04-30 RX ADMIN — BETAMETHASONE SODIUM PHOSPHATE AND BETAMETHASONE ACETATE 6 MG: 3; 3 INJECTION, SUSPENSION INTRA-ARTICULAR; INTRALESIONAL; INTRAMUSCULAR; SOFT TISSUE at 16:46

## 2025-06-12 ENCOUNTER — OFFICE VISIT (OUTPATIENT)
Dept: PRIMARY CARE CLINIC | Age: 70
End: 2025-06-12

## 2025-06-12 ENCOUNTER — TELEPHONE (OUTPATIENT)
Age: 70
End: 2025-06-12

## 2025-06-12 VITALS
RESPIRATION RATE: 18 BRPM | DIASTOLIC BLOOD PRESSURE: 78 MMHG | BODY MASS INDEX: 33.82 KG/M2 | OXYGEN SATURATION: 98 % | WEIGHT: 203 LBS | TEMPERATURE: 97.5 F | HEART RATE: 52 BPM | HEIGHT: 65 IN | SYSTOLIC BLOOD PRESSURE: 158 MMHG

## 2025-06-12 DIAGNOSIS — Z91.89 AT RISK FOR DRUG THERAPY PROBLEM: Primary | ICD-10-CM

## 2025-06-12 NOTE — TELEPHONE ENCOUNTER
Consult from dr DAYANARA Youngblood related to financial concerns of Farxiga and Xarelto.  Dr Youngblood advises pt brought in paperwork from [employer] which was completed for pt.  W checked w [facility] PAP and pt can apply for Xarelto PAP thru [employer] having Medicare and if family of 2 income is under $53,450 of family of 1 as $46,900.  Message left for pt to call [facility] direct number at [phone]

## 2025-06-12 NOTE — PROGRESS NOTES
[facility]      SUBJECTIVE:  Malinda Stewart (:  1955) is a 70 y.o. female here for evaluation of the following chief complaint(s):  No chief complaint on file.    Assessment & Plan  1. Tricuspid valve regurgitation.  The patient's tricuspid valve is severely regurgitant, and she is currently under cardiology follow-up every 6 months.  The cardiologist has not discussed the possibility of future valve repair.  An echocardiogram is scheduled for her next visit.    2. Elevated kappa and lambda proteins.  Her kappa and lambda proteins are slightly elevated, but there is no M spike. The ratio is normal.  She will follow up with her oncologist in 10/2025 for further evaluation and potential increased labs.  The neurologist ordered these tests to address her neuropathy.    3. Neuropathy.  She has been experiencing neuropathy and was referred to Dr. Diaz, a specialist in neuropathy.  Most tests conducted by Dr. Diaz were normal.  She is not currently on any medication for neuropathy.  Physical therapy has been focused on back pain, but the primary issue is balance.    4. Hypertension.  Her blood pressure was elevated during a recent pulmonology visit but has been well-controlled at home.  Blood pressure will be rechecked before she leaves today.  Pulmonologist noted a small amount of fluid in her lungs, possibly attributed to amiodarone.  Pulmonary function tests may be repeated in 3 months, pending cardiology consultation.    5. Constipation.  Her constipation is well-managed with Dulcolax.    6. Acid reflux.  Her acid reflux is well-controlled with pantoprazole.    7. Medication management.  She is currently on amiodarone, amlodipine 5 mg, aspirin, Lipitor, Dulcolax, Zyrtec, Farxiga, iron, Flonase, losartan, metoprolol, multivitamin, pantoprazole, Xarelto, and torsemide.  No new muscle aches or pains reported with Lipitor.  The form for Farxiga will

## 2025-06-16 ENCOUNTER — TELEPHONE (OUTPATIENT)
Age: 70
End: 2025-06-16

## 2025-06-16 NOTE — TELEPHONE ENCOUNTER
Returned pt's call; pt aware of Xarelto PAP guidelines and indicates being over income.  Reviewed the Medicare Part D changes of $2,000 OOP for catastrophic phase; also discussed 20[facility]ifit [facility] (for pt is [employer]); emailied guidelines to pt.  Advised if new information options is found will contact pt with updates which pt is appreciative for

## 2025-07-09 ENCOUNTER — TELEPHONE (OUTPATIENT)
Dept: PRIMARY CARE CLINIC | Age: 70
End: 2025-07-09

## 2025-07-09 DIAGNOSIS — I10 PRIMARY HYPERTENSION: ICD-10-CM

## 2025-07-09 DIAGNOSIS — N18.31 STAGE 3A CHRONIC KIDNEY DISEASE (HCC): ICD-10-CM

## 2025-07-09 DIAGNOSIS — E78.2 MIXED HYPERLIPIDEMIA: Primary | ICD-10-CM

## 2025-07-09 DIAGNOSIS — I48.20 CHRONIC ATRIAL FIBRILLATION (HCC): ICD-10-CM

## 2025-07-09 DIAGNOSIS — E05.90 SUBCLINICAL HYPERTHYROIDISM: ICD-10-CM

## 2025-07-09 NOTE — TELEPHONE ENCOUNTER
Pt called today about lab orders. Said she was to have orders from Dr De Leon to get labs done that would then work for [facility], but they cannot do orders for pt.  She is asking if [facility] can put those orders in. Pls notify pt only if  cannot.

## 2025-07-30 ENCOUNTER — OFFICE VISIT (OUTPATIENT)
Dept: ORTHOPEDIC SURGERY | Age: 70
End: 2025-07-30
Payer: MEDICARE

## 2025-07-30 VITALS — BODY MASS INDEX: 32.99 KG/M2 | OXYGEN SATURATION: 98 % | HEIGHT: 65 IN | WEIGHT: 198 LBS | HEART RATE: 64 BPM

## 2025-07-30 DIAGNOSIS — M25.552 PAIN OF LEFT HIP: Primary | ICD-10-CM

## 2025-07-30 PROCEDURE — 3017F COLORECTAL CA SCREEN DOC REV: CPT | Performed by: FAMILY MEDICINE

## 2025-07-30 PROCEDURE — G8399 PT W/DXA RESULTS DOCUMENT: HCPCS | Performed by: FAMILY MEDICINE

## 2025-07-30 PROCEDURE — G8417 CALC BMI ABV UP PARAM F/U: HCPCS | Performed by: FAMILY MEDICINE

## 2025-07-30 PROCEDURE — 1036F TOBACCO NON-USER: CPT | Performed by: FAMILY MEDICINE

## 2025-07-30 PROCEDURE — 1090F PRES/ABSN URINE INCON ASSESS: CPT | Performed by: FAMILY MEDICINE

## 2025-07-30 PROCEDURE — 1159F MED LIST DOCD IN RCRD: CPT | Performed by: FAMILY MEDICINE

## 2025-07-30 PROCEDURE — 99213 OFFICE O/P EST LOW 20 MIN: CPT | Performed by: FAMILY MEDICINE

## 2025-07-30 PROCEDURE — 1123F ACP DISCUSS/DSCN MKR DOCD: CPT | Performed by: FAMILY MEDICINE

## 2025-07-30 PROCEDURE — G8427 DOCREV CUR MEDS BY ELIG CLIN: HCPCS | Performed by: FAMILY MEDICINE

## 2025-07-31 ENCOUNTER — TELEPHONE (OUTPATIENT)
Dept: PRIMARY CARE CLINIC | Age: 70
End: 2025-07-31

## 2025-07-31 DIAGNOSIS — S32.9XXD CLOSED NONDISPLACED FRACTURE OF PELVIS WITH ROUTINE HEALING, UNSPECIFIED PART OF PELVIS, SUBSEQUENT ENCOUNTER: Primary | ICD-10-CM

## 2025-07-31 RX ORDER — HYDROCODONE BITARTRATE AND ACETAMINOPHEN 7.5; 325 MG/1; MG/1
1 TABLET ORAL 2 TIMES DAILY
Qty: 14 TABLET | Refills: 0 | Status: SHIPPED | OUTPATIENT
Start: 2025-07-31 | End: 2025-08-07

## 2025-07-31 NOTE — TELEPHONE ENCOUNTER
June 14 th was walking her dog.  The dog saw a rabbit and the dog pulled her backwards onto the driveway.  She underwent an X-ray and fractured her pelvis.  She has 2 areas of fracture.      Was overall feeling better.  Having a harder time bearing weight. Was using heat and ice. No change in pain.  This has been progressively worse. Saw Dr. Talbert yesterday.  Another X-ray was ordered.  Today, struggled with water therapy.      Will prescribe Norco for pain control at this time.  One week supply sent to pharmacy.     Any Youngblood MD  7/31/2025

## 2025-08-01 ENCOUNTER — PATIENT MESSAGE (OUTPATIENT)
Dept: PRIMARY CARE CLINIC | Age: 70
End: 2025-08-01

## 2025-08-18 RX ORDER — AMIODARONE HYDROCHLORIDE 200 MG/1
200 TABLET ORAL DAILY
Qty: 90 TABLET | Refills: 1 | Status: SHIPPED | OUTPATIENT
Start: 2025-08-18

## 2025-08-27 DIAGNOSIS — N18.31 STAGE 3A CHRONIC KIDNEY DISEASE (HCC): ICD-10-CM

## 2025-08-27 DIAGNOSIS — E05.90 SUBCLINICAL HYPERTHYROIDISM: ICD-10-CM

## 2025-08-27 DIAGNOSIS — E78.2 MIXED HYPERLIPIDEMIA: ICD-10-CM

## 2025-08-27 LAB
ALBUMIN: 4 G/DL (ref 3.5–5.2)
ALP BLD-CCNC: 119 U/L (ref 35–104)
ALT SERPL-CCNC: 14 U/L (ref 0–35)
ANION GAP SERPL CALCULATED.3IONS-SCNC: 16 MMOL/L (ref 7–16)
AST SERPL-CCNC: 21 U/L (ref 0–35)
BASOPHILS ABSOLUTE: 0.07 K/UL (ref 0–0.2)
BASOPHILS RELATIVE PERCENT: 1 % (ref 0–2)
BILIRUB SERPL-MCNC: 0.6 MG/DL (ref 0–1.2)
BUN BLDV-MCNC: 23 MG/DL (ref 8–23)
CALCIUM SERPL-MCNC: 9.2 MG/DL (ref 8.8–10.2)
CHLORIDE BLD-SCNC: 105 MMOL/L (ref 98–107)
CHOLESTEROL, FASTING: 153 MG/DL
CO2: 21 MMOL/L (ref 22–29)
CREAT SERPL-MCNC: 1.5 MG/DL (ref 0.5–1)
EOSINOPHILS ABSOLUTE: 0.27 K/UL (ref 0.05–0.5)
EOSINOPHILS RELATIVE PERCENT: 3 % (ref 0–6)
GFR, ESTIMATED: 36 ML/MIN/1.73M2
GLUCOSE BLD-MCNC: 100 MG/DL (ref 74–99)
HCT VFR BLD CALC: 36.7 % (ref 34–48)
HDLC SERPL-MCNC: 57 MG/DL
HEMOGLOBIN: 11.3 G/DL (ref 11.5–15.5)
IMMATURE GRANULOCYTES %: 1 % (ref 0–5)
IMMATURE GRANULOCYTES ABSOLUTE: 0.08 K/UL (ref 0–0.58)
LDL CHOLESTEROL: 79 MG/DL
LYMPHOCYTES ABSOLUTE: 1.28 K/UL (ref 1.5–4)
LYMPHOCYTES RELATIVE PERCENT: 13 % (ref 20–42)
MCH RBC QN AUTO: 30.1 PG (ref 26–35)
MCHC RBC AUTO-ENTMCNC: 30.8 G/DL (ref 32–34.5)
MCV RBC AUTO: 97.9 FL (ref 80–99.9)
MONOCYTES ABSOLUTE: 0.83 K/UL (ref 0.1–0.95)
MONOCYTES RELATIVE PERCENT: 9 % (ref 2–12)
NEUTROPHILS ABSOLUTE: 7.19 K/UL (ref 1.8–7.3)
NEUTROPHILS RELATIVE PERCENT: 74 % (ref 43–80)
PDW BLD-RTO: 15 % (ref 11.5–15)
PHOSPHORUS: 3.6 MG/DL (ref 2.5–4.5)
PLATELET # BLD: 296 K/UL (ref 130–450)
PMV BLD AUTO: 10.3 FL (ref 7–12)
POTASSIUM SERPL-SCNC: 3.8 MMOL/L (ref 3.5–5.1)
PTH INTACT: 89 PG/ML (ref 15–65)
RBC # BLD: 3.75 M/UL (ref 3.5–5.5)
SODIUM BLD-SCNC: 142 MMOL/L (ref 136–145)
T4 FREE: 1.8 NG/DL (ref 0.9–1.7)
TOTAL PROTEIN: 7.4 G/DL (ref 6.4–8.3)
TRIGLYCERIDE, FASTING: 85 MG/DL
TSH SERPL DL<=0.05 MIU/L-ACNC: 0.27 UIU/ML (ref 0.27–4.2)
VITAMIN D 25-HYDROXY: 37.5 NG/ML (ref 30–100)
VLDLC SERPL CALC-MCNC: 17 MG/DL
WBC # BLD: 9.7 K/UL (ref 4.5–11.5)

## 2025-08-28 ENCOUNTER — RESULTS FOLLOW-UP (OUTPATIENT)
Dept: PRIMARY CARE CLINIC | Age: 70
End: 2025-08-28